# Patient Record
Sex: FEMALE | Race: WHITE | NOT HISPANIC OR LATINO | Employment: OTHER | URBAN - METROPOLITAN AREA
[De-identification: names, ages, dates, MRNs, and addresses within clinical notes are randomized per-mention and may not be internally consistent; named-entity substitution may affect disease eponyms.]

---

## 2021-07-27 ENCOUNTER — OFFICE VISIT (OUTPATIENT)
Dept: CARDIOLOGY | Facility: CLINIC | Age: 56
End: 2021-07-27

## 2021-07-27 VITALS
OXYGEN SATURATION: 96 % | BODY MASS INDEX: 30.36 KG/M2 | HEART RATE: 103 BPM | HEIGHT: 69 IN | DIASTOLIC BLOOD PRESSURE: 90 MMHG | SYSTOLIC BLOOD PRESSURE: 124 MMHG | WEIGHT: 205 LBS

## 2021-07-27 DIAGNOSIS — I95.1 AUTONOMIC ORTHOSTATIC HYPOTENSION: Primary | ICD-10-CM

## 2021-07-27 PROCEDURE — 99204 OFFICE O/P NEW MOD 45 MIN: CPT | Performed by: INTERNAL MEDICINE

## 2021-07-27 RX ORDER — AMITRIPTYLINE HYDROCHLORIDE 50 MG/1
50 TABLET, FILM COATED ORAL
COMMUNITY

## 2021-07-27 RX ORDER — LEVOTHYROXINE SODIUM 88 UG/1
88 TABLET ORAL DAILY
COMMUNITY
Start: 2021-07-08 | End: 2022-01-25

## 2021-07-27 RX ORDER — WARFARIN SODIUM 3 MG/1
3 TABLET ORAL NIGHTLY
COMMUNITY
Start: 2021-05-08

## 2021-07-27 RX ORDER — METOPROLOL SUCCINATE 100 MG/1
100 TABLET, EXTENDED RELEASE ORAL DAILY
COMMUNITY
End: 2021-07-27

## 2021-07-27 RX ORDER — MIDODRINE HYDROCHLORIDE 5 MG/1
5 TABLET ORAL
Qty: 90 TABLET | Refills: 5 | Status: SHIPPED | OUTPATIENT
Start: 2021-07-27 | End: 2022-01-25 | Stop reason: SDUPTHER

## 2021-07-27 RX ORDER — ALENDRONATE SODIUM 70 MG/1
70 TABLET ORAL WEEKLY
COMMUNITY
End: 2022-05-02

## 2021-07-27 RX ORDER — SERTRALINE HYDROCHLORIDE 100 MG/1
100 TABLET, FILM COATED ORAL DAILY
COMMUNITY

## 2021-07-27 RX ORDER — FAMOTIDINE 20 MG/1
20 TABLET, FILM COATED ORAL 2 TIMES DAILY
COMMUNITY

## 2021-07-27 RX ORDER — BACLOFEN 10 MG/1
10 TABLET ORAL 3 TIMES DAILY
COMMUNITY

## 2021-07-27 RX ORDER — ATORVASTATIN CALCIUM 40 MG/1
40 TABLET, FILM COATED ORAL NIGHTLY
Status: ON HOLD | COMMUNITY
Start: 2021-02-22 | End: 2022-05-06 | Stop reason: SDUPTHER

## 2021-07-27 RX ORDER — GABAPENTIN 300 MG/1
300 CAPSULE ORAL 3 TIMES DAILY
COMMUNITY

## 2021-07-27 NOTE — PROGRESS NOTES
"[facility] at [facility]  Consultation H&P  Corrina Reis  1965  [phone]  There is no work phone number on file..    VISIT DATE:  07/27/2021    PCP: Abbey Merrill MD  [address] [facility] [place] [address]    CC:  Chief Complaint   Patient presents with   • Dizziness   • Loss of Consciousness   • Rapid Heart Rate       ASSESSMENT:   Diagnosis Plan   1. Autonomic orthostatic hypotension  Cortisol - AM    Aldosterone / Renin Ratio    TSH       PLAN:  Recurrent orthostatic hypotension: Associated hypokalemia.  Off antihypertensives.  No obvious trigger.  Discussed conservative measures such as avoiding dehydration, liberalizing salt intake, elevating head of bed and as needed use of compression stockings.  Starting midodrine 5 mg p.o. 3 times daily.  A.m. cortisol level pending.    History of Present Illness   56-year-old female who is developed recurrent presyncope and syncope starting in March of this year.  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  Now using a wheeled walker for ambulation due to recurrent presyncope syncope.  Systolic blood pressures will intermittently dipped down into the 80 to 90 mmHg range.  No change in baseline weight.  No new medication prior to onset of her symptoms.  No illness prior to onset of symptoms.  She has been weaned off metoprolol.  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    PHYSICAL EXAMINATION:  Vitals:    07/27/21 1058   BP: 124/90   BP Location: Left arm   Patient Position: Sitting   Pulse: 103   SpO2: 96%   Weight: 93 kg (205 lb)   Height: 175.3 cm (69\")     General Appearance:    Alert, cooperative, no distress, appears stated age   Head:    Normocephalic, without obvious abnormality, atraumatic   Eyes:    conjunctiva/corneas clear, EOM's intact, fundi     benign, " both eyes   Ears:    Normal TM's and external ear canals, both ears   Nose:   Nares normal, septum midline, mucosa normal, no drainage    or sinus tenderness   Throat:   Lips, mucosa, and tongue normal; teeth and gums normal   Neck:   Supple, symmetrical, trachea midline, no adenopathy;     thyroid:  no enlargement/tenderness/nodules; no carotid    bruit or JVD   Back:     Symmetric, no curvature, ROM normal, no CVA tenderness   Lungs:     Clear to auscultation bilaterally, respirations unlabored   Chest Wall:    No tenderness or deformity    Heart:    Regular rate and rhythm, S1 and S2 normal, no murmur, rub   or gallop, normal carotid impulse bilaterally without bruit.   Abdomen:     Soft, non-tender, bowel sounds active all four quadrants,     no masses, no organomegaly   Extremities:   Extremities normal, atraumatic, no cyanosis or edema   Pulses:   2+ and symmetric all extremities   Skin:   Skin color, texture, turgor normal, no rashes or lesions   Lymph nodes:   Cervical, supraclavicular, and axillary nodes normal   Neurologic:   normal strength, sensation intact     throughout       Diagnostic Data:  Procedures  No results found for: CHLPL, TRIG, HDL, LDLDIRECT  Lab Results   Component Value Date    BUN 14 06/15/2021    CREATININE 0.8 06/15/2021     06/15/2021    K 3.0 (L) 06/15/2021     06/15/2021    CO2 24 06/15/2021     No results found for: HGBA1C  Lab Results   Component Value Date    WBC 6.7 06/25/2021    HGB 11.2 (L) 06/25/2021    HCT 33.6 06/25/2021     06/25/2021       PROBLEM LIST:  Patient Active Problem List   Diagnosis   • Autonomic orthostatic hypotension       PAST MEDICAL HX  Past Medical History:   Diagnosis Date   • Anxiety    • Deep vein thrombosis (CMS/HCC)    • GERD (gastroesophageal reflux disease)    • History of pulmonary embolus (PE)    • Hyperlipidemia    • Hypertension    • MRSA infection        Allergies  Allergies   Allergen Reactions   • Penicillins Hives   •  Sulfa Antibiotics Hives       Current Medications    Current Outpatient Medications:   •  alendronate (FOSAMAX) 70 MG tablet, Take 70 mg by mouth 1 (One) Time Per Week., Disp: , Rfl:   •  amitriptyline (ELAVIL) 50 MG tablet, 1 tablet every night at bedtime., Disp: , Rfl:   •  atorvastatin (LIPITOR) 20 MG tablet, Take 20 mg by mouth Daily., Disp: , Rfl:   •  baclofen (LIORESAL) 10 MG tablet, Take 10 mg by mouth 3 (Three) Times a Day., Disp: , Rfl:   •  famotidine (PEPCID) 20 MG tablet, Take 20 mg by mouth 2 (two) times a day., Disp: , Rfl:   •  gabapentin (NEURONTIN) 300 MG capsule, Take 300 mg by mouth 3 (Three) Times a Day., Disp: , Rfl:   •  levothyroxine (SYNTHROID, LEVOTHROID) 88 MCG tablet, Take 88 mcg by mouth Daily., Disp: , Rfl:   •  sertraline (ZOLOFT) 100 MG tablet, 1 tablet Daily., Disp: , Rfl:   •  warfarin (COUMADIN) 2.5 MG tablet, Take 2.5 mg by mouth., Disp: , Rfl:   •  midodrine (PROAMATINE) 5 MG tablet, Take 1 tablet by mouth 3 (Three) Times a Day Before Meals., Disp: 90 tablet, Rfl: 5         ROS  ROS    All other body systems reviewed and are negative    SOCIAL HX  Social History     Socioeconomic History   • Marital status:      Spouse name: Not on file   • Number of children: Not on file   • Years of education: Not on file   • Highest education level: Not on file   Tobacco Use   • Smoking status: Never Smoker   • Smokeless tobacco: Never Used   Vaping Use   • Vaping Use: Never used   Substance and Sexual Activity   • Alcohol use: Not Currently   • Drug use: Not Currently   • Sexual activity: Defer       FAMILY HX  Family History   Problem Relation Age of Onset   • Heart disease Mother    • Atrial fibrillation Mother    • Diabetes Mother    • No Known Problems Father              Raman Meza III, MD, [facility]

## 2022-01-25 ENCOUNTER — OFFICE VISIT (OUTPATIENT)
Dept: CARDIOLOGY | Facility: CLINIC | Age: 57
End: 2022-01-25

## 2022-01-25 VITALS
DIASTOLIC BLOOD PRESSURE: 72 MMHG | HEART RATE: 108 BPM | SYSTOLIC BLOOD PRESSURE: 124 MMHG | BODY MASS INDEX: 34.37 KG/M2 | HEIGHT: 68 IN | WEIGHT: 226.8 LBS | OXYGEN SATURATION: 95 %

## 2022-01-25 DIAGNOSIS — I95.1 AUTONOMIC ORTHOSTATIC HYPOTENSION: Primary | ICD-10-CM

## 2022-01-25 PROCEDURE — 99213 OFFICE O/P EST LOW 20 MIN: CPT | Performed by: INTERNAL MEDICINE

## 2022-01-25 RX ORDER — LEVOTHYROXINE SODIUM 0.1 MG/1
100 TABLET ORAL
COMMUNITY

## 2022-01-25 RX ORDER — MIDODRINE HYDROCHLORIDE 5 MG/1
5 TABLET ORAL
Qty: 90 TABLET | Refills: 5 | Status: SHIPPED | OUTPATIENT
Start: 2022-01-25 | End: 2022-05-02

## 2022-01-25 RX ORDER — LIDOCAINE 50 MG/G
1 PATCH TOPICAL DAILY PRN
COMMUNITY
Start: 2022-01-15

## 2022-01-25 NOTE — PROGRESS NOTES
[facility]  Office visit  Corrina Reis  1965  [phone]  There is no work phone number on file.    VISIT DATE:  1/25/2022    PCP: Abbey Merrill MD  [address] [place] [address]    CC:  Chief Complaint   Patient presents with   • Autonomic orthostatic hypotension           ASSESSMENT:   Diagnosis Plan   1. Autonomic orthostatic hypotension         PLAN:  Recurrent orthostatic hypotension:  Continue conservative measures such as avoiding dehydration, liberalizing salt intake, elevating head of bed and as needed use of compression stockings.    Continue midodrine 5 mg p.o. 3 times daily.     Perioperative cardiovascular risk assessment: Upcoming left knee replacement.  Will be low risk for major perioperative cardiovascular complications.  No further cardiac testing or medication titration indicated prior to surgery.    Subjective  Normal assessment: Has had no further episodes of presyncope or syncope since initiation of midodrine at the end of last summer.  Blood pressures still running less than 130/80 mmHg.    Initial evaluation: 56-year-old female who is developed recurrent presyncope and syncope starting in March of this year.  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  Now using a wheeled walker for ambulation due to recurrent presyncope syncope.  Systolic blood pressures will intermittently dipped down into the 80 to 90 mmHg range.  No change in baseline weight.  No new medication prior to onset of her symptoms.  No illness prior to onset of symptoms.  She has been weaned off metoprolol.  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    PHYSICAL EXAMINATION:  Vitals:    01/25/22 1418   BP: 124/72   BP Location: Left arm   Patient Position: Sitting   Pulse: 108   SpO2:  "95%   Weight: 103 kg (226 lb 12.8 oz)   Height: 172.7 cm (68\")     General Appearance:    Alert, cooperative, no distress, appears stated age   Head:    Normocephalic, without obvious abnormality, atraumatic   Eyes:    conjunctiva/corneas clear   Nose:   Nares normal, septum midline, mucosa normal, no drainage   Throat:   Lips, teeth and gums normal   Neck:   Supple, symmetrical, trachea midline, no carotid    bruit or JVD   Lungs:     Clear to auscultation bilaterally, respirations unlabored   Chest Wall:    No tenderness or deformity    Heart:    Regular rate and rhythm, S1 and S2 normal, no murmur, rub   or gallop, normal carotid impulse bilaterally without bruit.   Abdomen:     Soft, non-tender   Extremities:   Extremities normal, atraumatic, no cyanosis or edema   Pulses:   2+ and symmetric all extremities   Skin:   Skin color, texture, turgor normal, no rashes or lesions       Diagnostic Data:  Procedures  No results found for: CHLPL, TRIG, HDL, LDLDIRECT  Lab Results   Component Value Date    BUN 14 06/15/2021    CREATININE 0.8 06/15/2021     06/15/2021    K 3.0 (L) 06/15/2021     06/15/2021    CO2 24 06/15/2021     No results found for: HGBA1C  Lab Results   Component Value Date    WBC 6.7 06/25/2021    HGB 11.2 (L) 06/25/2021    HCT 33.6 06/25/2021     06/25/2021       Allergies  Allergies   Allergen Reactions   • Penicillins Hives   • Sulfa Antibiotics Hives       Current Medications    Current Outpatient Medications:   •  alendronate (FOSAMAX) 70 MG tablet, Take 70 mg by mouth 1 (One) Time Per Week., Disp: , Rfl:   •  amitriptyline (ELAVIL) 50 MG tablet, 1 tablet every night at bedtime., Disp: , Rfl:   •  atorvastatin (LIPITOR) 20 MG tablet, Take 20 mg by mouth Daily., Disp: , Rfl:   •  baclofen (LIORESAL) 10 MG tablet, Take 10 mg by mouth 3 (Three) Times a Day., Disp: , Rfl:   •  famotidine (PEPCID) 20 MG tablet, Take 20 mg by mouth 2 (two) times a day., Disp: , Rfl:   •  gabapentin " (NEURONTIN) 300 MG capsule, Take 300 mg by mouth 3 (Three) Times a Day., Disp: , Rfl:   •  levothyroxine (SYNTHROID, LEVOTHROID) 100 MCG tablet, Take 100 mcg by mouth Daily., Disp: , Rfl:   •  lidocaine (LIDODERM) 5 %, APPLY 1 PATCH BY TRANSDERMAL ROUTE EVERY DAY (MAY WEAR UP TO 12HOURS.) AS NEEDED, Disp: , Rfl:   •  midodrine (PROAMATINE) 5 MG tablet, Take 1 tablet by mouth 3 (Three) Times a Day Before Meals., Disp: 90 tablet, Rfl: 5  •  sertraline (ZOLOFT) 100 MG tablet, 1 tablet Daily., Disp: , Rfl:   •  warfarin (COUMADIN) 3 MG tablet, Take 3 mg by mouth Every Night., Disp: , Rfl:           ROS  ROS      SOCIAL HX  Social History     Socioeconomic History   • Marital status:    Tobacco Use   • Smoking status: Never Smoker   • Smokeless tobacco: Never Used   Vaping Use   • Vaping Use: Never used   Substance and Sexual Activity   • Alcohol use: Not Currently   • Drug use: Not Currently   • Sexual activity: Defer       FAMILY HX  Family History   Problem Relation Age of Onset   • Heart disease Mother    • Atrial fibrillation Mother    • Diabetes Mother    • No Known Problems Father    • No Known Problems Sister    • Hypertension Sister    • Autoimmune disease Sister    • Diabetes Sister    • Graves' disease Sister    • Lung cancer Brother    • Rectal cancer Brother    • Diabetes Brother    • Hypertension Brother    • Graves' disease Brother    • Hypertension Brother    • Heart attack Brother              Raman Meza III, MD, [facility]

## 2022-02-09 ENCOUNTER — APPOINTMENT (OUTPATIENT)
Dept: PREADMISSION TESTING | Facility: HOSPITAL | Age: 57
End: 2022-02-09

## 2022-05-02 ENCOUNTER — PRE-ADMISSION TESTING (OUTPATIENT)
Dept: PREADMISSION TESTING | Facility: HOSPITAL | Age: 57
End: 2022-05-02

## 2022-05-02 VITALS — HEIGHT: 68 IN | WEIGHT: 230.71 LBS | BODY MASS INDEX: 34.97 KG/M2

## 2022-05-02 LAB
25(OH)D3 SERPL-MCNC: 31.8 NG/ML (ref 30–100)
ALBUMIN SERPL-MCNC: 4.7 G/DL (ref 3.5–5.2)
ALBUMIN/GLOB SERPL: 2 G/DL
ALP SERPL-CCNC: 79 U/L (ref 39–117)
ALT SERPL W P-5'-P-CCNC: 21 U/L (ref 1–33)
ANION GAP SERPL CALCULATED.3IONS-SCNC: 10 MMOL/L (ref 5–15)
APTT PPP: 33.8 SECONDS (ref 50–95)
AST SERPL-CCNC: 26 U/L (ref 1–32)
BASOPHILS # BLD AUTO: 0.06 10*3/MM3 (ref 0–0.2)
BASOPHILS NFR BLD AUTO: 1.2 % (ref 0–1.5)
BILIRUB SERPL-MCNC: 0.6 MG/DL (ref 0–1.2)
BUN SERPL-MCNC: 15 MG/DL (ref 6–20)
BUN/CREAT SERPL: 14.3 (ref 7–25)
CALCIUM SPEC-SCNC: 9.3 MG/DL (ref 8.6–10.5)
CHLORIDE SERPL-SCNC: 103 MMOL/L (ref 98–107)
CO2 SERPL-SCNC: 27 MMOL/L (ref 22–29)
CREAT SERPL-MCNC: 1.05 MG/DL (ref 0.57–1)
CRP SERPL-MCNC: 0.83 MG/DL (ref 0–0.5)
DEPRECATED RDW RBC AUTO: 45.1 FL (ref 37–54)
EGFRCR SERPLBLD CKD-EPI 2021: 61.7 ML/MIN/1.73
EOSINOPHIL # BLD AUTO: 0.12 10*3/MM3 (ref 0–0.4)
EOSINOPHIL NFR BLD AUTO: 2.3 % (ref 0.3–6.2)
ERYTHROCYTE [DISTWIDTH] IN BLOOD BY AUTOMATED COUNT: 12.8 % (ref 12.3–15.4)
ERYTHROCYTE [SEDIMENTATION RATE] IN BLOOD: 16 MM/HR (ref 0–30)
GLOBULIN UR ELPH-MCNC: 2.4 GM/DL
GLUCOSE SERPL-MCNC: 109 MG/DL (ref 65–99)
HBA1C MFR BLD: 5.3 % (ref 4.8–5.6)
HCT VFR BLD AUTO: 39 % (ref 34–46.6)
HGB BLD-MCNC: 13 G/DL (ref 12–15.9)
IMM GRANULOCYTES # BLD AUTO: 0.01 10*3/MM3 (ref 0–0.05)
IMM GRANULOCYTES NFR BLD AUTO: 0.2 % (ref 0–0.5)
INR PPP: 1.52 (ref 0.84–1.13)
LYMPHOCYTES # BLD AUTO: 1.47 10*3/MM3 (ref 0.7–3.1)
LYMPHOCYTES NFR BLD AUTO: 28.8 % (ref 19.6–45.3)
MCH RBC QN AUTO: 32 PG (ref 26.6–33)
MCHC RBC AUTO-ENTMCNC: 33.3 G/DL (ref 31.5–35.7)
MCV RBC AUTO: 96.1 FL (ref 79–97)
MONOCYTES # BLD AUTO: 0.39 10*3/MM3 (ref 0.1–0.9)
MONOCYTES NFR BLD AUTO: 7.6 % (ref 5–12)
MRSA DNA SPEC QL NAA+PROBE: NEGATIVE
NEUTROPHILS NFR BLD AUTO: 3.06 10*3/MM3 (ref 1.7–7)
NEUTROPHILS NFR BLD AUTO: 59.9 % (ref 42.7–76)
NRBC BLD AUTO-RTO: 0 /100 WBC (ref 0–0.2)
PLATELET # BLD AUTO: 224 10*3/MM3 (ref 140–450)
PMV BLD AUTO: 10.4 FL (ref 6–12)
POTASSIUM SERPL-SCNC: 3.8 MMOL/L (ref 3.5–5.2)
PROT SERPL-MCNC: 7.1 G/DL (ref 6–8.5)
PROTHROMBIN TIME: 18.3 SECONDS (ref 11.4–14.4)
QT INTERVAL: 420 MS
QTC INTERVAL: 478 MS
RBC # BLD AUTO: 4.06 10*6/MM3 (ref 3.77–5.28)
SARS-COV-2 RNA PNL SPEC NAA+PROBE: NOT DETECTED
SODIUM SERPL-SCNC: 140 MMOL/L (ref 136–145)
WBC NRBC COR # BLD: 5.11 10*3/MM3 (ref 3.4–10.8)

## 2022-05-02 PROCEDURE — C9803 HOPD COVID-19 SPEC COLLECT: HCPCS

## 2022-05-02 PROCEDURE — 87641 MR-STAPH DNA AMP PROBE: CPT

## 2022-05-02 PROCEDURE — 80053 COMPREHEN METABOLIC PANEL: CPT

## 2022-05-02 PROCEDURE — 86140 C-REACTIVE PROTEIN: CPT

## 2022-05-02 PROCEDURE — 36415 COLL VENOUS BLD VENIPUNCTURE: CPT

## 2022-05-02 PROCEDURE — 85730 THROMBOPLASTIN TIME PARTIAL: CPT

## 2022-05-02 PROCEDURE — 83036 HEMOGLOBIN GLYCOSYLATED A1C: CPT

## 2022-05-02 PROCEDURE — 85610 PROTHROMBIN TIME: CPT

## 2022-05-02 PROCEDURE — 85652 RBC SED RATE AUTOMATED: CPT

## 2022-05-02 PROCEDURE — 85025 COMPLETE CBC W/AUTO DIFF WBC: CPT

## 2022-05-02 PROCEDURE — 93010 ELECTROCARDIOGRAM REPORT: CPT | Performed by: INTERNAL MEDICINE

## 2022-05-02 PROCEDURE — 82306 VITAMIN D 25 HYDROXY: CPT

## 2022-05-02 PROCEDURE — G0480 DRUG TEST DEF 1-7 CLASSES: HCPCS

## 2022-05-02 PROCEDURE — 93005 ELECTROCARDIOGRAM TRACING: CPT

## 2022-05-02 PROCEDURE — 82985 ASSAY OF GLYCATED PROTEIN: CPT

## 2022-05-02 PROCEDURE — U0004 COV-19 TEST NON-CDC HGH THRU: HCPCS

## 2022-05-02 RX ORDER — ONABOTULINUMTOXINA 200 [USP'U]/1
INJECTION, POWDER, LYOPHILIZED, FOR SOLUTION INTRADERMAL; INTRAMUSCULAR
COMMUNITY

## 2022-05-02 RX ORDER — DIPHENHYDRAMINE HCL 25 MG
25 CAPSULE ORAL EVERY 6 HOURS PRN
COMMUNITY
End: 2023-03-13

## 2022-05-02 RX ORDER — MIDODRINE HYDROCHLORIDE 5 MG/1
5 TABLET ORAL 3 TIMES DAILY
COMMUNITY
End: 2022-08-24 | Stop reason: DRUGHIGH

## 2022-05-02 RX ORDER — HYDROCHLOROTHIAZIDE 12.5 MG/1
12.5 CAPSULE, GELATIN COATED ORAL DAILY
COMMUNITY
Start: 2022-04-14 | End: 2022-09-01

## 2022-05-02 RX ORDER — CYCLOBENZAPRINE HCL 5 MG
5 TABLET ORAL 3 TIMES DAILY PRN
COMMUNITY

## 2022-05-02 RX ORDER — SULINDAC 150 MG/1
150 TABLET ORAL DAILY
Status: ON HOLD | COMMUNITY
End: 2022-05-06 | Stop reason: SDUPTHER

## 2022-05-03 ENCOUNTER — ANESTHESIA EVENT (OUTPATIENT)
Dept: PERIOP | Facility: HOSPITAL | Age: 57
End: 2022-05-03

## 2022-05-03 LAB — FRUCTOSAMINE SERPL-SCNC: 275 UMOL/L (ref 0–285)

## 2022-05-03 RX ORDER — FAMOTIDINE 10 MG/ML
20 INJECTION, SOLUTION INTRAVENOUS ONCE
Status: CANCELLED | OUTPATIENT
Start: 2022-05-03 | End: 2022-05-03

## 2022-05-03 RX ORDER — SODIUM CHLORIDE 0.9 % (FLUSH) 0.9 %
10 SYRINGE (ML) INJECTION EVERY 12 HOURS SCHEDULED
Status: CANCELLED | OUTPATIENT
Start: 2022-05-03

## 2022-05-04 ENCOUNTER — ANESTHESIA (OUTPATIENT)
Dept: PERIOP | Facility: HOSPITAL | Age: 57
End: 2022-05-04

## 2022-05-04 ENCOUNTER — ANESTHESIA EVENT CONVERTED (OUTPATIENT)
Dept: ANESTHESIOLOGY | Facility: HOSPITAL | Age: 57
End: 2022-05-04

## 2022-05-04 ENCOUNTER — APPOINTMENT (OUTPATIENT)
Dept: GENERAL RADIOLOGY | Facility: HOSPITAL | Age: 57
End: 2022-05-04

## 2022-05-04 ENCOUNTER — HOSPITAL ENCOUNTER (INPATIENT)
Facility: HOSPITAL | Age: 57
LOS: 6 days | Discharge: HOME HEALTH | End: 2022-05-10
Attending: ORTHOPAEDIC SURGERY | Admitting: ORTHOPAEDIC SURGERY

## 2022-05-04 DIAGNOSIS — M00.9 INFECTION OF LEFT KNEE: ICD-10-CM

## 2022-05-04 DIAGNOSIS — Z98.890 S/P DEBRIDEMENT: Primary | ICD-10-CM

## 2022-05-04 PROBLEM — I10 HYPERTENSION: Status: ACTIVE | Noted: 2022-05-04

## 2022-05-04 PROBLEM — E78.5 HYPERLIPIDEMIA: Status: ACTIVE | Noted: 2022-05-04

## 2022-05-04 PROBLEM — I10 HYPERTENSION: Status: RESOLVED | Noted: 2022-05-04 | Resolved: 2022-05-04

## 2022-05-04 PROBLEM — Z86.718 HISTORY OF DVT (DEEP VEIN THROMBOSIS): Status: ACTIVE | Noted: 2022-05-04

## 2022-05-04 PROBLEM — E03.9 HYPOTHYROID: Status: ACTIVE | Noted: 2022-05-04

## 2022-05-04 LAB
GLUCOSE BLDC GLUCOMTR-MCNC: 106 MG/DL (ref 70–130)
INR PPP: 1.35 (ref 0.84–1.13)
PROTHROMBIN TIME: 16.6 SECONDS (ref 11.4–14.4)

## 2022-05-04 PROCEDURE — 25010000002 CEFAZOLIN IN DEXTROSE 2-4 GM/100ML-% SOLUTION: Performed by: ORTHOPAEDIC SURGERY

## 2022-05-04 PROCEDURE — 73560 X-RAY EXAM OF KNEE 1 OR 2: CPT

## 2022-05-04 PROCEDURE — 25010000002 FENTANYL CITRATE (PF) 50 MCG/ML SOLUTION

## 2022-05-04 PROCEDURE — 25010000002 DEXAMETHASONE SODIUM PHOSPHATE 10 MG/ML SOLUTION: Performed by: NURSE ANESTHETIST, CERTIFIED REGISTERED

## 2022-05-04 PROCEDURE — 0SRD0EZ REPLACEMENT OF LEFT KNEE JOINT WITH ARTICULATING SPACER, OPEN APPROACH: ICD-10-PCS | Performed by: ORTHOPAEDIC SURGERY

## 2022-05-04 PROCEDURE — 0 TOBRAMYCIN PER 80 MG: Performed by: ORTHOPAEDIC SURGERY

## 2022-05-04 PROCEDURE — 85610 PROTHROMBIN TIME: CPT | Performed by: ANESTHESIOLOGY

## 2022-05-04 PROCEDURE — 25010000002 VANCOMYCIN 10 G RECONSTITUTED SOLUTION: Performed by: ORTHOPAEDIC SURGERY

## 2022-05-04 PROCEDURE — C1776 JOINT DEVICE (IMPLANTABLE): HCPCS | Performed by: ORTHOPAEDIC SURGERY

## 2022-05-04 PROCEDURE — 97116 GAIT TRAINING THERAPY: CPT

## 2022-05-04 PROCEDURE — 87102 FUNGUS ISOLATION CULTURE: CPT | Performed by: ORTHOPAEDIC SURGERY

## 2022-05-04 PROCEDURE — 87075 CULTR BACTERIA EXCEPT BLOOD: CPT | Performed by: ORTHOPAEDIC SURGERY

## 2022-05-04 PROCEDURE — 25010000002 ROPIVACAINE PER 1 MG: Performed by: NURSE ANESTHETIST, CERTIFIED REGISTERED

## 2022-05-04 PROCEDURE — 0 LIDOCAINE 1 % SOLUTION: Performed by: NURSE ANESTHETIST, CERTIFIED REGISTERED

## 2022-05-04 PROCEDURE — 0SBD0ZZ EXCISION OF LEFT KNEE JOINT, OPEN APPROACH: ICD-10-PCS | Performed by: ORTHOPAEDIC SURGERY

## 2022-05-04 PROCEDURE — C1713 ANCHOR/SCREW BN/BN,TIS/BN: HCPCS | Performed by: ORTHOPAEDIC SURGERY

## 2022-05-04 PROCEDURE — 87205 SMEAR GRAM STAIN: CPT | Performed by: ORTHOPAEDIC SURGERY

## 2022-05-04 PROCEDURE — 87176 TISSUE HOMOGENIZATION CULTR: CPT | Performed by: ORTHOPAEDIC SURGERY

## 2022-05-04 PROCEDURE — 25010000002 FENTANYL CITRATE (PF) 50 MCG/ML SOLUTION: Performed by: NURSE ANESTHETIST, CERTIFIED REGISTERED

## 2022-05-04 PROCEDURE — 82962 GLUCOSE BLOOD TEST: CPT

## 2022-05-04 PROCEDURE — 97161 PT EVAL LOW COMPLEX 20 MIN: CPT

## 2022-05-04 PROCEDURE — L1830 KO IMMOB CANVAS LONG PRE OTS: HCPCS | Performed by: ORTHOPAEDIC SURGERY

## 2022-05-04 PROCEDURE — 27310 EXPLORATION OF KNEE JOINT: CPT

## 2022-05-04 PROCEDURE — 87206 SMEAR FLUORESCENT/ACID STAI: CPT | Performed by: ORTHOPAEDIC SURGERY

## 2022-05-04 PROCEDURE — 25010000002 PROPOFOL 10 MG/ML EMULSION: Performed by: NURSE ANESTHETIST, CERTIFIED REGISTERED

## 2022-05-04 PROCEDURE — 25010000002 ONDANSETRON PER 1 MG: Performed by: NURSE ANESTHETIST, CERTIFIED REGISTERED

## 2022-05-04 PROCEDURE — 87070 CULTURE OTHR SPECIMN AEROBIC: CPT | Performed by: ORTHOPAEDIC SURGERY

## 2022-05-04 PROCEDURE — 87116 MYCOBACTERIA CULTURE: CPT | Performed by: ORTHOPAEDIC SURGERY

## 2022-05-04 PROCEDURE — 25010000002 DEXAMETHASONE PER 1 MG: Performed by: NURSE ANESTHETIST, CERTIFIED REGISTERED

## 2022-05-04 PROCEDURE — 25010000002 VANCOMYCIN 1 G RECONSTITUTED SOLUTION: Performed by: ORTHOPAEDIC SURGERY

## 2022-05-04 DEVICE — DEV CONTRL TISS STRATAFIX SPIRAL PDO BIDIR 1 36X36CM: Type: IMPLANTABLE DEVICE | Site: KNEE | Status: FUNCTIONAL

## 2022-05-04 DEVICE — CMT BONE SIMPLEX/P FULL DOSE 10/PK: Type: IMPLANTABLE DEVICE | Site: KNEE | Status: FUNCTIONAL

## 2022-05-04 DEVICE — IMPLANTABLE DEVICE: Type: IMPLANTABLE DEVICE | Site: KNEE | Status: FUNCTIONAL

## 2022-05-04 DEVICE — COMP FEM TRIATH CR CMT NO6 LT: Type: IMPLANTABLE DEVICE | Site: KNEE | Status: FUNCTIONAL

## 2022-05-04 RX ORDER — MEPERIDINE HYDROCHLORIDE 25 MG/ML
12.5 INJECTION INTRAMUSCULAR; INTRAVENOUS; SUBCUTANEOUS
Status: DISCONTINUED | OUTPATIENT
Start: 2022-05-04 | End: 2022-05-04 | Stop reason: HOSPADM

## 2022-05-04 RX ORDER — FAMOTIDINE 20 MG/1
20 TABLET, FILM COATED ORAL ONCE
Status: COMPLETED | OUTPATIENT
Start: 2022-05-04 | End: 2022-05-04

## 2022-05-04 RX ORDER — MAGNESIUM HYDROXIDE 1200 MG/15ML
LIQUID ORAL AS NEEDED
Status: DISCONTINUED | OUTPATIENT
Start: 2022-05-04 | End: 2022-05-04 | Stop reason: HOSPADM

## 2022-05-04 RX ORDER — MIDAZOLAM HYDROCHLORIDE 1 MG/ML
1 INJECTION INTRAMUSCULAR; INTRAVENOUS
Status: DISCONTINUED | OUTPATIENT
Start: 2022-05-04 | End: 2022-05-04 | Stop reason: HOSPADM

## 2022-05-04 RX ORDER — HYDROCODONE BITARTRATE AND ACETAMINOPHEN 5; 325 MG/1; MG/1
1 TABLET ORAL ONCE AS NEEDED
Status: DISCONTINUED | OUTPATIENT
Start: 2022-05-04 | End: 2022-05-04 | Stop reason: HOSPADM

## 2022-05-04 RX ORDER — DIPHENHYDRAMINE HCL 25 MG
25 CAPSULE ORAL EVERY 6 HOURS PRN
Status: DISCONTINUED | OUTPATIENT
Start: 2022-05-04 | End: 2022-05-10 | Stop reason: HOSPADM

## 2022-05-04 RX ORDER — PROMETHAZINE HYDROCHLORIDE 25 MG/1
25 TABLET ORAL ONCE AS NEEDED
Status: DISCONTINUED | OUTPATIENT
Start: 2022-05-04 | End: 2022-05-04 | Stop reason: HOSPADM

## 2022-05-04 RX ORDER — TOBRAMYCIN 1.2 G/30ML
INJECTION, POWDER, LYOPHILIZED, FOR SOLUTION INTRAVENOUS AS NEEDED
Status: DISCONTINUED | OUTPATIENT
Start: 2022-05-04 | End: 2022-05-04 | Stop reason: HOSPADM

## 2022-05-04 RX ORDER — MELOXICAM 15 MG/1
15 TABLET ORAL ONCE
Status: COMPLETED | OUTPATIENT
Start: 2022-05-04 | End: 2022-05-04

## 2022-05-04 RX ORDER — MIDODRINE HYDROCHLORIDE 5 MG/1
5 TABLET ORAL
Status: DISCONTINUED | OUTPATIENT
Start: 2022-05-04 | End: 2022-05-10 | Stop reason: HOSPADM

## 2022-05-04 RX ORDER — ACETAMINOPHEN 500 MG
1000 TABLET ORAL ONCE
Status: COMPLETED | OUTPATIENT
Start: 2022-05-04 | End: 2022-05-04

## 2022-05-04 RX ORDER — TRAMADOL HYDROCHLORIDE 50 MG/1
50 TABLET ORAL EVERY 8 HOURS PRN
Status: DISCONTINUED | OUTPATIENT
Start: 2022-05-04 | End: 2022-05-10 | Stop reason: HOSPADM

## 2022-05-04 RX ORDER — LABETALOL HYDROCHLORIDE 5 MG/ML
10 INJECTION, SOLUTION INTRAVENOUS EVERY 4 HOURS PRN
Status: DISCONTINUED | OUTPATIENT
Start: 2022-05-04 | End: 2022-05-10 | Stop reason: HOSPADM

## 2022-05-04 RX ORDER — BACLOFEN 10 MG/1
10 TABLET ORAL 3 TIMES DAILY
Status: DISCONTINUED | OUTPATIENT
Start: 2022-05-04 | End: 2022-05-10 | Stop reason: HOSPADM

## 2022-05-04 RX ORDER — MELOXICAM 7.5 MG/1
15 TABLET ORAL DAILY
Status: DISCONTINUED | OUTPATIENT
Start: 2022-05-04 | End: 2022-05-10 | Stop reason: HOSPADM

## 2022-05-04 RX ORDER — CYCLOBENZAPRINE HCL 10 MG
5 TABLET ORAL 3 TIMES DAILY PRN
Status: DISCONTINUED | OUTPATIENT
Start: 2022-05-04 | End: 2022-05-10 | Stop reason: HOSPADM

## 2022-05-04 RX ORDER — SODIUM CHLORIDE 0.9 % (FLUSH) 0.9 %
3-10 SYRINGE (ML) INJECTION AS NEEDED
Status: DISCONTINUED | OUTPATIENT
Start: 2022-05-04 | End: 2022-05-04 | Stop reason: HOSPADM

## 2022-05-04 RX ORDER — ROCURONIUM BROMIDE 10 MG/ML
INJECTION, SOLUTION INTRAVENOUS AS NEEDED
Status: DISCONTINUED | OUTPATIENT
Start: 2022-05-04 | End: 2022-05-04 | Stop reason: SURG

## 2022-05-04 RX ORDER — CEFAZOLIN SODIUM 2 G/100ML
2 INJECTION, SOLUTION INTRAVENOUS ONCE
Status: COMPLETED | OUTPATIENT
Start: 2022-05-04 | End: 2022-05-04

## 2022-05-04 RX ORDER — ONDANSETRON 2 MG/ML
INJECTION INTRAMUSCULAR; INTRAVENOUS AS NEEDED
Status: DISCONTINUED | OUTPATIENT
Start: 2022-05-04 | End: 2022-05-04 | Stop reason: SURG

## 2022-05-04 RX ORDER — BUPIVACAINE HYDROCHLORIDE 2.5 MG/ML
INJECTION, SOLUTION EPIDURAL; INFILTRATION; INTRACAUDAL
Status: COMPLETED | OUTPATIENT
Start: 2022-05-04 | End: 2022-05-04

## 2022-05-04 RX ORDER — OXYCODONE HYDROCHLORIDE 5 MG/1
5 TABLET ORAL EVERY 4 HOURS PRN
Status: DISCONTINUED | OUTPATIENT
Start: 2022-05-04 | End: 2022-05-10 | Stop reason: HOSPADM

## 2022-05-04 RX ORDER — GABAPENTIN 300 MG/1
300 CAPSULE ORAL 3 TIMES DAILY
Status: DISCONTINUED | OUTPATIENT
Start: 2022-05-04 | End: 2022-05-10 | Stop reason: HOSPADM

## 2022-05-04 RX ORDER — OXYCODONE HYDROCHLORIDE 5 MG/1
10 TABLET ORAL EVERY 4 HOURS PRN
Status: DISCONTINUED | OUTPATIENT
Start: 2022-05-04 | End: 2022-05-10 | Stop reason: HOSPADM

## 2022-05-04 RX ORDER — ONDANSETRON 4 MG/1
4 TABLET, FILM COATED ORAL EVERY 6 HOURS PRN
Status: DISCONTINUED | OUTPATIENT
Start: 2022-05-04 | End: 2022-05-10 | Stop reason: HOSPADM

## 2022-05-04 RX ORDER — ONDANSETRON 2 MG/ML
4 INJECTION INTRAMUSCULAR; INTRAVENOUS EVERY 6 HOURS PRN
Status: DISCONTINUED | OUTPATIENT
Start: 2022-05-04 | End: 2022-05-10 | Stop reason: HOSPADM

## 2022-05-04 RX ORDER — DROPERIDOL 2.5 MG/ML
0.62 INJECTION, SOLUTION INTRAMUSCULAR; INTRAVENOUS ONCE AS NEEDED
Status: DISCONTINUED | OUTPATIENT
Start: 2022-05-04 | End: 2022-05-04 | Stop reason: HOSPADM

## 2022-05-04 RX ORDER — ONDANSETRON 2 MG/ML
4 INJECTION INTRAMUSCULAR; INTRAVENOUS ONCE AS NEEDED
Status: DISCONTINUED | OUTPATIENT
Start: 2022-05-04 | End: 2022-05-04 | Stop reason: HOSPADM

## 2022-05-04 RX ORDER — IPRATROPIUM BROMIDE AND ALBUTEROL SULFATE 2.5; .5 MG/3ML; MG/3ML
3 SOLUTION RESPIRATORY (INHALATION) ONCE AS NEEDED
Status: DISCONTINUED | OUTPATIENT
Start: 2022-05-04 | End: 2022-05-04 | Stop reason: HOSPADM

## 2022-05-04 RX ORDER — FENTANYL CITRATE 50 UG/ML
INJECTION, SOLUTION INTRAMUSCULAR; INTRAVENOUS AS NEEDED
Status: DISCONTINUED | OUTPATIENT
Start: 2022-05-04 | End: 2022-05-04 | Stop reason: SURG

## 2022-05-04 RX ORDER — ACETAMINOPHEN 160 MG
TABLET,DISINTEGRATING ORAL AS NEEDED
Status: DISCONTINUED | OUTPATIENT
Start: 2022-05-04 | End: 2022-05-04 | Stop reason: HOSPADM

## 2022-05-04 RX ORDER — WARFARIN SODIUM 3 MG/1
3 TABLET ORAL
Status: DISCONTINUED | OUTPATIENT
Start: 2022-05-04 | End: 2022-05-05

## 2022-05-04 RX ORDER — HYDROMORPHONE HYDROCHLORIDE 1 MG/ML
0.5 INJECTION, SOLUTION INTRAMUSCULAR; INTRAVENOUS; SUBCUTANEOUS
Status: DISCONTINUED | OUTPATIENT
Start: 2022-05-04 | End: 2022-05-10 | Stop reason: HOSPADM

## 2022-05-04 RX ORDER — ATORVASTATIN CALCIUM 40 MG/1
40 TABLET, FILM COATED ORAL NIGHTLY
Status: DISCONTINUED | OUTPATIENT
Start: 2022-05-04 | End: 2022-05-05

## 2022-05-04 RX ORDER — LABETALOL HYDROCHLORIDE 5 MG/ML
5 INJECTION, SOLUTION INTRAVENOUS
Status: DISCONTINUED | OUTPATIENT
Start: 2022-05-04 | End: 2022-05-04 | Stop reason: HOSPADM

## 2022-05-04 RX ORDER — SODIUM CHLORIDE, SODIUM LACTATE, POTASSIUM CHLORIDE, CALCIUM CHLORIDE 600; 310; 30; 20 MG/100ML; MG/100ML; MG/100ML; MG/100ML
100 INJECTION, SOLUTION INTRAVENOUS CONTINUOUS
Status: DISCONTINUED | OUTPATIENT
Start: 2022-05-04 | End: 2022-05-10 | Stop reason: HOSPADM

## 2022-05-04 RX ORDER — LEVOTHYROXINE SODIUM 0.1 MG/1
100 TABLET ORAL
Status: DISCONTINUED | OUTPATIENT
Start: 2022-05-05 | End: 2022-05-10 | Stop reason: HOSPADM

## 2022-05-04 RX ORDER — SERTRALINE HYDROCHLORIDE 100 MG/1
100 TABLET, FILM COATED ORAL DAILY
Status: DISCONTINUED | OUTPATIENT
Start: 2022-05-04 | End: 2022-05-10 | Stop reason: HOSPADM

## 2022-05-04 RX ORDER — PROMETHAZINE HYDROCHLORIDE 25 MG/1
25 SUPPOSITORY RECTAL ONCE AS NEEDED
Status: DISCONTINUED | OUTPATIENT
Start: 2022-05-04 | End: 2022-05-04 | Stop reason: HOSPADM

## 2022-05-04 RX ORDER — LIDOCAINE HYDROCHLORIDE 10 MG/ML
INJECTION, SOLUTION INFILTRATION; PERINEURAL AS NEEDED
Status: DISCONTINUED | OUTPATIENT
Start: 2022-05-04 | End: 2022-05-04 | Stop reason: SURG

## 2022-05-04 RX ORDER — PREGABALIN 75 MG/1
75 CAPSULE ORAL ONCE
Status: COMPLETED | OUTPATIENT
Start: 2022-05-04 | End: 2022-05-04

## 2022-05-04 RX ORDER — SODIUM CHLORIDE 0.9 % (FLUSH) 0.9 %
3 SYRINGE (ML) INJECTION EVERY 12 HOURS SCHEDULED
Status: DISCONTINUED | OUTPATIENT
Start: 2022-05-04 | End: 2022-05-04 | Stop reason: HOSPADM

## 2022-05-04 RX ORDER — ACETAMINOPHEN 500 MG
1000 TABLET ORAL EVERY 8 HOURS SCHEDULED
Status: DISCONTINUED | OUTPATIENT
Start: 2022-05-04 | End: 2022-05-08 | Stop reason: ALTCHOICE

## 2022-05-04 RX ORDER — LIDOCAINE HYDROCHLORIDE 10 MG/ML
0.5 INJECTION, SOLUTION EPIDURAL; INFILTRATION; INTRACAUDAL; PERINEURAL ONCE AS NEEDED
Status: COMPLETED | OUTPATIENT
Start: 2022-05-04 | End: 2022-05-04

## 2022-05-04 RX ORDER — DEXAMETHASONE SODIUM PHOSPHATE 10 MG/ML
INJECTION, SOLUTION INTRAMUSCULAR; INTRAVENOUS
Status: COMPLETED | OUTPATIENT
Start: 2022-05-04 | End: 2022-05-04

## 2022-05-04 RX ORDER — DIPHENHYDRAMINE HYDROCHLORIDE 50 MG/ML
25 INJECTION INTRAMUSCULAR; INTRAVENOUS EVERY 6 HOURS PRN
Status: DISCONTINUED | OUTPATIENT
Start: 2022-05-04 | End: 2022-05-10 | Stop reason: HOSPADM

## 2022-05-04 RX ORDER — VANCOMYCIN HYDROCHLORIDE 1 G/20ML
INJECTION, POWDER, LYOPHILIZED, FOR SOLUTION INTRAVENOUS AS NEEDED
Status: DISCONTINUED | OUTPATIENT
Start: 2022-05-04 | End: 2022-05-04 | Stop reason: HOSPADM

## 2022-05-04 RX ORDER — CEFAZOLIN SODIUM 2 G/100ML
2 INJECTION, SOLUTION INTRAVENOUS EVERY 8 HOURS
Status: COMPLETED | OUTPATIENT
Start: 2022-05-04 | End: 2022-05-04

## 2022-05-04 RX ORDER — TRANEXAMIC ACID 10 MG/ML
1000 INJECTION, SOLUTION INTRAVENOUS ONCE
Status: COMPLETED | OUTPATIENT
Start: 2022-05-04 | End: 2022-05-04

## 2022-05-04 RX ORDER — DEXAMETHASONE SODIUM PHOSPHATE 4 MG/ML
INJECTION, SOLUTION INTRA-ARTICULAR; INTRALESIONAL; INTRAMUSCULAR; INTRAVENOUS; SOFT TISSUE AS NEEDED
Status: DISCONTINUED | OUTPATIENT
Start: 2022-05-04 | End: 2022-05-04 | Stop reason: SURG

## 2022-05-04 RX ORDER — NALOXONE HCL 0.4 MG/ML
0.1 VIAL (ML) INJECTION
Status: DISCONTINUED | OUTPATIENT
Start: 2022-05-04 | End: 2022-05-10 | Stop reason: HOSPADM

## 2022-05-04 RX ORDER — DROPERIDOL 2.5 MG/ML
0.62 INJECTION, SOLUTION INTRAMUSCULAR; INTRAVENOUS AS NEEDED
Status: DISCONTINUED | OUTPATIENT
Start: 2022-05-04 | End: 2022-05-04 | Stop reason: HOSPADM

## 2022-05-04 RX ORDER — AMITRIPTYLINE HYDROCHLORIDE 50 MG/1
50 TABLET, FILM COATED ORAL NIGHTLY
Status: DISCONTINUED | OUTPATIENT
Start: 2022-05-04 | End: 2022-05-10 | Stop reason: HOSPADM

## 2022-05-04 RX ORDER — BUPIVACAINE HCL/0.9 % NACL/PF 0.125 %
PLASTIC BAG, INJECTION (ML) EPIDURAL AS NEEDED
Status: DISCONTINUED | OUTPATIENT
Start: 2022-05-04 | End: 2022-05-04 | Stop reason: SURG

## 2022-05-04 RX ORDER — HYDROMORPHONE HYDROCHLORIDE 1 MG/ML
0.5 INJECTION, SOLUTION INTRAMUSCULAR; INTRAVENOUS; SUBCUTANEOUS
Status: DISCONTINUED | OUTPATIENT
Start: 2022-05-04 | End: 2022-05-04 | Stop reason: HOSPADM

## 2022-05-04 RX ORDER — NALOXONE HCL 0.4 MG/ML
0.4 VIAL (ML) INJECTION AS NEEDED
Status: DISCONTINUED | OUTPATIENT
Start: 2022-05-04 | End: 2022-05-04 | Stop reason: HOSPADM

## 2022-05-04 RX ORDER — SODIUM CHLORIDE 0.9 % (FLUSH) 0.9 %
10 SYRINGE (ML) INJECTION AS NEEDED
Status: DISCONTINUED | OUTPATIENT
Start: 2022-05-04 | End: 2022-05-04 | Stop reason: HOSPADM

## 2022-05-04 RX ORDER — FENTANYL CITRATE 50 UG/ML
50 INJECTION, SOLUTION INTRAMUSCULAR; INTRAVENOUS
Status: DISCONTINUED | OUTPATIENT
Start: 2022-05-04 | End: 2022-05-04 | Stop reason: HOSPADM

## 2022-05-04 RX ORDER — FENTANYL CITRATE 50 UG/ML
INJECTION, SOLUTION INTRAMUSCULAR; INTRAVENOUS
Status: COMPLETED
Start: 2022-05-04 | End: 2022-05-04

## 2022-05-04 RX ORDER — KETOROLAC TROMETHAMINE 30 MG/ML
15 INJECTION, SOLUTION INTRAMUSCULAR; INTRAVENOUS EVERY 6 HOURS PRN
Status: DISPENSED | OUTPATIENT
Start: 2022-05-04 | End: 2022-05-08

## 2022-05-04 RX ORDER — FAMOTIDINE 20 MG/1
20 TABLET, FILM COATED ORAL DAILY
Status: DISCONTINUED | OUTPATIENT
Start: 2022-05-04 | End: 2022-05-10 | Stop reason: HOSPADM

## 2022-05-04 RX ORDER — PROPOFOL 10 MG/ML
VIAL (ML) INTRAVENOUS AS NEEDED
Status: DISCONTINUED | OUTPATIENT
Start: 2022-05-04 | End: 2022-05-04 | Stop reason: SURG

## 2022-05-04 RX ORDER — SODIUM CHLORIDE, SODIUM LACTATE, POTASSIUM CHLORIDE, CALCIUM CHLORIDE 600; 310; 30; 20 MG/100ML; MG/100ML; MG/100ML; MG/100ML
9 INJECTION, SOLUTION INTRAVENOUS CONTINUOUS
Status: DISCONTINUED | OUTPATIENT
Start: 2022-05-04 | End: 2022-05-09

## 2022-05-04 RX ADMIN — Medication 100 MCG: at 12:01

## 2022-05-04 RX ADMIN — PROPOFOL 150 MG: 10 INJECTION, EMULSION INTRAVENOUS at 10:26

## 2022-05-04 RX ADMIN — WARFARIN SODIUM 3 MG: 3 TABLET ORAL at 17:55

## 2022-05-04 RX ADMIN — GABAPENTIN 300 MG: 300 CAPSULE ORAL at 16:09

## 2022-05-04 RX ADMIN — CEFAZOLIN SODIUM 2 G: 2 INJECTION, SOLUTION INTRAVENOUS at 10:37

## 2022-05-04 RX ADMIN — ACETAMINOPHEN 1000 MG: 500 TABLET ORAL at 08:58

## 2022-05-04 RX ADMIN — LIDOCAINE HYDROCHLORIDE 0.5 ML: 10 INJECTION, SOLUTION EPIDURAL; INFILTRATION; INTRACAUDAL; PERINEURAL at 08:58

## 2022-05-04 RX ADMIN — FAMOTIDINE 20 MG: 20 TABLET ORAL at 08:58

## 2022-05-04 RX ADMIN — ROCURONIUM BROMIDE 50 MG: 10 INJECTION, SOLUTION INTRAVENOUS at 10:26

## 2022-05-04 RX ADMIN — FENTANYL CITRATE 50 MCG: 50 INJECTION, SOLUTION INTRAMUSCULAR; INTRAVENOUS at 10:51

## 2022-05-04 RX ADMIN — AMITRIPTYLINE HYDROCHLORIDE 50 MG: 50 TABLET, FILM COATED ORAL at 20:55

## 2022-05-04 RX ADMIN — DEXAMETHASONE SODIUM PHOSPHATE 2 MG: 10 INJECTION, SOLUTION INTRAMUSCULAR; INTRAVENOUS at 10:30

## 2022-05-04 RX ADMIN — BUPIVACAINE HYDROCHLORIDE 10 ML: 2.5 INJECTION, SOLUTION EPIDURAL; INFILTRATION; INTRACAUDAL at 13:06

## 2022-05-04 RX ADMIN — DEXAMETHASONE SODIUM PHOSPHATE 4 MG: 4 INJECTION, SOLUTION INTRA-ARTICULAR; INTRALESIONAL; INTRAMUSCULAR; INTRAVENOUS; SOFT TISSUE at 10:38

## 2022-05-04 RX ADMIN — Medication 100 MCG: at 10:58

## 2022-05-04 RX ADMIN — BACLOFEN 10 MG: 10 TABLET ORAL at 20:55

## 2022-05-04 RX ADMIN — ACETAMINOPHEN 1000 MG: 500 TABLET ORAL at 16:09

## 2022-05-04 RX ADMIN — PREGABALIN 75 MG: 75 CAPSULE ORAL at 08:58

## 2022-05-04 RX ADMIN — PROPOFOL 25 MCG/KG/MIN: 10 INJECTION, EMULSION INTRAVENOUS at 10:30

## 2022-05-04 RX ADMIN — SODIUM CHLORIDE, POTASSIUM CHLORIDE, SODIUM LACTATE AND CALCIUM CHLORIDE 9 ML/HR: 600; 310; 30; 20 INJECTION, SOLUTION INTRAVENOUS at 08:58

## 2022-05-04 RX ADMIN — Medication 50 MCG: at 12:05

## 2022-05-04 RX ADMIN — VANCOMYCIN HYDROCHLORIDE 1500 MG: 10 INJECTION, POWDER, LYOPHILIZED, FOR SOLUTION INTRAVENOUS at 10:33

## 2022-05-04 RX ADMIN — FENTANYL CITRATE 50 MCG: 50 INJECTION, SOLUTION INTRAMUSCULAR; INTRAVENOUS at 13:09

## 2022-05-04 RX ADMIN — TRAMADOL HYDROCHLORIDE 50 MG: 50 TABLET, COATED ORAL at 17:58

## 2022-05-04 RX ADMIN — MELOXICAM 15 MG: 15 TABLET ORAL at 08:58

## 2022-05-04 RX ADMIN — TRANEXAMIC ACID 1000 MG: 10 INJECTION, SOLUTION INTRAVENOUS at 11:59

## 2022-05-04 RX ADMIN — FENTANYL CITRATE 50 MCG: 50 INJECTION, SOLUTION INTRAMUSCULAR; INTRAVENOUS at 10:26

## 2022-05-04 RX ADMIN — FENTANYL CITRATE 50 MCG: 50 INJECTION, SOLUTION INTRAMUSCULAR; INTRAVENOUS at 14:22

## 2022-05-04 RX ADMIN — Medication 50 MCG: at 11:24

## 2022-05-04 RX ADMIN — VANCOMYCIN HYDROCHLORIDE 1500 MG: 10 INJECTION, POWDER, LYOPHILIZED, FOR SOLUTION INTRAVENOUS at 09:16

## 2022-05-04 RX ADMIN — ROPIVACAINE HYDROCHLORIDE 10 ML/HR: 5 INJECTION, SOLUTION EPIDURAL; INFILTRATION; PERINEURAL at 13:08

## 2022-05-04 RX ADMIN — FENTANYL CITRATE 50 MCG: 0.05 INJECTION, SOLUTION INTRAMUSCULAR; INTRAVENOUS at 13:09

## 2022-05-04 RX ADMIN — SUGAMMADEX 200 MG: 100 INJECTION, SOLUTION INTRAVENOUS at 12:18

## 2022-05-04 RX ADMIN — CEFAZOLIN SODIUM 2 G: 2 INJECTION, SOLUTION INTRAVENOUS at 16:09

## 2022-05-04 RX ADMIN — ACETAMINOPHEN 1000 MG: 500 TABLET ORAL at 20:55

## 2022-05-04 RX ADMIN — FAMOTIDINE 20 MG: 20 TABLET ORAL at 16:10

## 2022-05-04 RX ADMIN — SODIUM CHLORIDE, POTASSIUM CHLORIDE, SODIUM LACTATE AND CALCIUM CHLORIDE 100 ML/HR: 600; 310; 30; 20 INJECTION, SOLUTION INTRAVENOUS at 16:08

## 2022-05-04 RX ADMIN — SODIUM CHLORIDE, POTASSIUM CHLORIDE, SODIUM LACTATE AND CALCIUM CHLORIDE: 600; 310; 30; 20 INJECTION, SOLUTION INTRAVENOUS at 11:59

## 2022-05-04 RX ADMIN — ONDANSETRON 4 MG: 2 INJECTION INTRAMUSCULAR; INTRAVENOUS at 12:01

## 2022-05-04 RX ADMIN — GABAPENTIN 300 MG: 300 CAPSULE ORAL at 20:55

## 2022-05-04 RX ADMIN — CEFAZOLIN SODIUM 2 G: 2 INJECTION, SOLUTION INTRAVENOUS at 21:49

## 2022-05-04 RX ADMIN — BACLOFEN 10 MG: 10 TABLET ORAL at 16:09

## 2022-05-04 RX ADMIN — MIDODRINE HYDROCHLORIDE 5 MG: 5 TABLET ORAL at 16:10

## 2022-05-04 RX ADMIN — MELOXICAM 15 MG: 7.5 TABLET ORAL at 16:10

## 2022-05-04 RX ADMIN — SERTRALINE 100 MG: 100 TABLET, FILM COATED ORAL at 16:09

## 2022-05-04 RX ADMIN — BUPIVACAINE HYDROCHLORIDE 30 ML: 2.5 INJECTION, SOLUTION EPIDURAL; INFILTRATION; INTRACAUDAL at 10:31

## 2022-05-04 RX ADMIN — TRANEXAMIC ACID 1000 MG: 10 INJECTION, SOLUTION INTRAVENOUS at 10:37

## 2022-05-04 RX ADMIN — BUPIVACAINE HYDROCHLORIDE 30 ML: 2.5 INJECTION, SOLUTION EPIDURAL; INFILTRATION; INTRACAUDAL; PERINEURAL at 10:30

## 2022-05-04 RX ADMIN — LIDOCAINE HYDROCHLORIDE 50 MG: 10 INJECTION, SOLUTION INFILTRATION; PERINEURAL at 10:26

## 2022-05-04 RX ADMIN — ATORVASTATIN CALCIUM 40 MG: 40 TABLET, FILM COATED ORAL at 20:55

## 2022-05-04 NOTE — PROGRESS NOTES
"Pharmacy Consult  -  Warfarin    Corrina Reis is a  56 y.o. female   Height - 172.7 cm (67.99\")  Weight - 105 kg (231 lb 7.7 oz)    Consulting Provider: Ortho  Indication: History of DVT/PE  Goal INR: 2-3  Home Regimen: 3 mg daily     Bridge Therapy: No    Drug-Drug Interactions with current regimen:     Meloxicam - may increase risk of bleeding      Tramadol - may increase risk of bleeding    Warfarin Dosing During Admission:    Date  5/4           INR  1.35           Dose  (3 mg)              Education Provided:    Discharge Follow up:   Following Provider -   Follow up time range or appointment -    Labs:    Results from last 7 days   Lab Units 05/04/22  0855 05/02/22  1208   INR  1.35* 1.52*   APTT seconds  --  33.8*   HEMOGLOBIN g/dL  --  13.0   HEMATOCRIT %  --  39.0     Results from last 7 days   Lab Units 05/02/22  1208   SODIUM mmol/L 140   POTASSIUM mmol/L 3.8   CHLORIDE mmol/L 103   CO2 mmol/L 27.0   BUN mg/dL 15   CREATININE mg/dL 1.05*   CALCIUM mg/dL 9.3   BILIRUBIN mg/dL 0.6   ALK PHOS U/L 79   ALT (SGPT) U/L 21   AST (SGOT) U/L 26   GLUCOSE mg/dL 109*     Current dietary intake: Patient just admitted, no PO intake documented yet. Will follow.    Diet Order   Procedures   • Diet Regular     Assessment/Plan:     Patient's INR is 1.35 today.  Continue home dose of warfarin 3 mg today.  Daily PT/INR ordered.  Monitor signs/symptoms of bleeding, dietary intake, and drug-drug interactions. Make dose adjustments as necessary.  Pharmacy will continue to follow.    Philomena Castillo, PharmD, BCPS  5/4/2022  15:43 EDT   "

## 2022-05-04 NOTE — ANESTHESIA PROCEDURE NOTES
Peripheral Block      Patient reassessed immediately prior to procedure    Patient location during procedure: OR  Reason for block: post-op pain management  Performed by  CRNA/CAA: Chino Stanley CRNA  Preanesthetic Checklist  Completed: patient identified, IV checked, site marked, surgical consent, monitors and equipment checked, pre-op evaluation and timeout performed  Prep:  Sterile barriers:gloves, cap, sterile barriers and mask  Prep: ChloraPrep  Patient monitoring: blood pressure monitoring, continuous pulse oximetry and EKG  Procedure    Guidance:ultrasound guided, nerve stimulator and landmark technique  Images:still images not obtained    Laterality:left  Block Type:femoral  Injection Technique:catheter  Needle Type:short-bevel  Needle Gauge:18 G  Resistance on Injection: none  Cath Depth at skin: 13 cm    Medications Used: bupivacaine PF (MARCAINE) 0.25 % injection, 10 mL  Med administered at 5/4/2022 1:06 PM      Post Assessment  Injection Assessment: negative aspiration for heme, no paresthesia on injection and incremental injection  Patient Tolerance:comfortable throughout block  Complications:no  Additional Notes  The BBRaun 360 degree echogenic needle was introduced in plane, in a lateral to medial direction at the level of the inguinal crease.  Under ultrasound guidance, the femoral artery and vein where located.  The needle was then directed below Fascia Iliacus towards the Femoral nerve.  NS was utilized to hydro dissect and kristel needle advancement towards the target structure.   LA was injected incrementally in 3-5 ml aliquots with negative aspirate.  LA spread was visualized around the nerve, negative intraneural injection, low injection pressures.  Thank you

## 2022-05-04 NOTE — PLAN OF CARE
Arrived from PACU at 15:02.  Patient required 2L NC to maintain saturation above 90%.  ROSELIA bnotified of HV unable to maintain  suction.  Evaluated site and manipulated to try to adjust without success.  Per recommendation HV will stay in place until MD rounds in AM.  Prevena would vac in place.  PNC at 10ml/hr.  Immobilizer placed in PACU.  Patient reported dizziness and weakness with ambulation.  VS remained WNL.  Will continue to monitor.

## 2022-05-04 NOTE — PLAN OF CARE
Problem: Adult Inpatient Plan of Care  Goal: Plan of Care Review  Flowsheets (Taken 5/4/2022 5714)  Progress: improving  Plan of Care Reviewed With: patient  Outcome Evaluation: PT eval complete. Pt ambulated 14 feet using RW and CGA x2. Gait limited by pt experiencing dizziness/LH, BP measured at 114/66 with symptoms improving with rest. RN present and aware. Bed mobility performed with min A and STS with CGA. KI to be donned for 2 weeks. Reviewed HEP and knee precautions. ADLs assessed, pt does not require OT eval at this time. Recommend pt d/c home with assist and OPPT.   Goal Outcome Evaluation:  Plan of Care Reviewed With: patient        Progress: improving  Outcome Evaluation: PT eval complete. Pt ambulated 14 feet using RW and CGA x2. Gait limited by pt experiencing dizziness/LH, BP measured at 114/66 with symptoms improving with rest. RN present and aware. Bed mobility performed with min A and STS with CGA. KI to be donned for 2 weeks. Reviewed HEP and knee precautions. ADLs assessed, pt does not require OT eval at this time. Recommend pt d/c home with assist and OPPT.

## 2022-05-04 NOTE — ANESTHESIA PROCEDURE NOTES
Peripheral Block      Patient reassessed immediately prior to procedure    Patient location during procedure: OR  Reason for block: at surgeon's request and post-op pain management  Performed by  CRNA/CAA: Chino Stanley, CRNA  Assisted by: Jose Riojas MD  Preanesthetic Checklist  Completed: patient identified, IV checked, site marked, risks and benefits discussed, surgical consent, monitors and equipment checked, pre-op evaluation and timeout performed  Prep:  Pt Position: supine  Sterile barriers:cap, gloves, mask, sterile barriers and washed/disinfected hands  Prep: ChloraPrep  Patient monitoring: blood pressure monitoring, continuous pulse oximetry and EKG  Procedure    Sedation: yes  Performed under: local infiltration  Guidance:ultrasound guided  Images:still images obtained, printed/placed on chart  Injection Technique:single-shot  Needle Type:echogenic and short-bevel  Needle Gauge:21 G  Resistance on Injection: none    Medications Used: dexamethasone sodium phosphate injection, 2 mg  bupivacaine PF (MARCAINE) 0.25 % injection, 30 mL  Med administered at 5/4/2022 10:30 AM      Medications  Comment:                       Post Assessment  Injection Assessment: negative aspiration for heme, no paresthesia on injection and incremental injection  Patient Tolerance:comfortable throughout block  Complications:no  Additional Notes  The pt was placed in  lateral position.   The pt was anesthetized with  IV Sedation( see meds).  With the use of ultrasound guidance, the popliteal artery was visualized above the shaft of the femur, the space between the popliteal artery and the femur was targeted for this procedure. The Insertion site was prepped in sterile fashion.  Skin and cutaneous tissue was infiltrated and anesthetized with 1% Lidocaine 3 mls via a 25g needle.  A BBraun 4 inch 20g echogenic needle was then  inserted approximately 2 cm proximal to the popliteal taz a at the lateral mid biceps femoris and  advanced In-plane with Ultrasound guidance.. Normal Saline PSF was utilized for hydrodissection of tissue.   LA injection spread was visualized between the popliteal artery and femur filling this space from medial to lateral,  injection was incremental 1-5ml, injection pressure was normal or little, no intraneural injection, no vascular injection.  Throughout the injection of the LA solution the sciatic nerve components were visualized, care was taken to keep any spread of the LA solution from reaching the sciatic nerve components. Thank you

## 2022-05-04 NOTE — ANESTHESIA PROCEDURE NOTES
Peripheral Block      Patient reassessed immediately prior to procedure    Patient location during procedure: OR  Reason for block: post-op pain management  Performed by  CRNA/CAA: Chino Stanley, CRNA  Assisted by: Jose Riojas MD  Preanesthetic Checklist  Completed: patient identified, IV checked, site marked, surgical consent, monitors and equipment checked, pre-op evaluation and timeout performed  Prep:  Sterile barriers:gloves, cap, sterile barriers and mask  Prep: ChloraPrep  Patient monitoring: blood pressure monitoring, continuous pulse oximetry and EKG  Procedure    Guidance:ultrasound guided, nerve stimulator and landmark technique  Images:still images not obtained    Block Type:femoral  Injection Technique:single-shot  Needle Type:short-bevel  Needle Gauge:20 G  Resistance on Injection: none    Medications Used: bupivacaine PF (MARCAINE) 0.25 % injection, 30 mL  Med administered at 5/4/2022 10:31 AM      Post Assessment  Injection Assessment: negative aspiration for heme, no paresthesia on injection and incremental injection  Patient Tolerance:comfortable throughout block  Complications:no  Additional Notes  The BBRaun 360 degree echogenic needle was introduced in plane, in a lateral to medial direction at the level of the inguinal crease.  Under ultrasound guidance, the femoral artery and vein where located.  The needle was then directed below Fascia Iliacus towards the Femoral nerve.  NS was utilized to hydro dissect and kristel needle advancement towards the target structure.   LA was injected incrementally in 3-5 ml aliquots with negative aspirate.  LA spread was visualized around the nerve, negative intraneural injection, low injection pressures.  Thank you

## 2022-05-04 NOTE — ANESTHESIA PREPROCEDURE EVALUATION
Anesthesia Evaluation     Patient summary reviewed and Nursing notes reviewed   history of anesthetic complications: PONV               Airway   Mallampati: II  TM distance: >3 FB  Neck ROM: full  No difficulty expected  Dental - normal exam     Pulmonary - normal exam   (+) pulmonary embolism (2014),   Cardiovascular - normal exam    (+) hypertension, DVT (2001, 2014; coumadin tx - last dose 6 days ago; INR = 1.3), hyperlipidemia,     ROS comment:   Echo 5/21: normal EF, mild TR    Neuro/Psych- negative ROS  GI/Hepatic/Renal/Endo    (+) morbid obesity, GERD,  thyroid problem hypothyroidism    Musculoskeletal (-) negative ROS    Abdominal  - normal exam    Bowel sounds: normal.   Substance History - negative use     OB/GYN negative ob/gyn ROS         Other                      Anesthesia Plan    ASA 3     spinal   (Peripheral nerve block + cath for post op pain relief)  intravenous induction     Anesthetic plan, all risks, benefits, and alternatives have been provided, discussed and informed consent has been obtained with: patient.    Plan discussed with CRNA.        CODE STATUS:

## 2022-05-04 NOTE — ANESTHESIA POSTPROCEDURE EVALUATION
Patient: Corrina Reis    Procedure Summary     Date: 05/04/22 Room / Location:  [facility]  [facility]    Anesthesia Start: 1007 Anesthesia Stop:     Procedure: IRRIGATION AND DEBRIDMENT, LEFT KNEE ANTIBIOTIC SPACER PLACEMENT (Left Knee) Diagnosis:     Surgeons: Rei Sutherland MD Provider: Jose Riojas MD    Anesthesia Type: spinal ASA Status: 3          Anesthesia Type: spinal    Vitals  Vitals Value Taken Time   BP 97/88 05/04/22 1255   Temp 97.5 °F (36.4 °C) 05/04/22 1244   Pulse 78 05/04/22 1301   Resp 16 05/04/22 1244   SpO2 95 % 05/04/22 1301   Vitals shown include unvalidated device data.        Post Anesthesia Care and Evaluation    Patient location during evaluation: PACU  Patient participation: complete - patient participated  Level of consciousness: awake and alert  Pain management: adequate  Airway patency: patent  Anesthetic complications: No anesthetic complications  PONV Status: none  Cardiovascular status: hemodynamically stable and acceptable  Respiratory status: nonlabored ventilation, acceptable and nasal cannula  Hydration status: acceptable

## 2022-05-04 NOTE — H&P
"Patient Name: Corrina Reis  MRN: [id number]  : 1965  DOS: 2022    Attending: Rei Sutherland MD    Primary Care Provider: Abbey Merrill MD      Chief complaint:  Left knee infection    Subjective   Patient is a pleasant 56 y.o. female presented for scheduled surgery by Dr. Sutherland.  She underwent irrigation and debridement, left knee antibiotic spacer placement under spinal anesthesia.  She tolerated surgery well and was admitted for further medical management.  She reports having had a fall in 2021.  At that time she started having pain, swelling and warmth to her left knee.  At that time she underwent \"cleanout\" and was placed on a month of IV vancomycin for MRSA followed by 6 weeks of doxycycline.  She continued to have pain and intermittent swelling.  She has used a walker since her fall.    When seen postop she is doing well.  Her pain is well controlled.  She denies nausea, shortness of breath or chest pain.  She does have history of DVT and PE.    Allergies:  Allergies   Allergen Reactions   • Penicillins Hives   • Sulfa Antibiotics Hives       Meds:  Medications Prior to Admission   Medication Sig Dispense Refill Last Dose   • amitriptyline (ELAVIL) 50 MG tablet Take 1 tablet by mouth every night at bedtime.   5/3/2022 at Unknown time   • atorvastatin (LIPITOR) 40 MG tablet Take 40 mg by mouth Every Night.   5/3/2022 at Unknown time   • baclofen (LIORESAL) 10 MG tablet Take 10 mg by mouth 3 (Three) Times a Day.   5/3/2022 at Unknown time   • cyclobenzaprine (FLEXERIL) 5 MG tablet Take 5 mg by mouth 3 (Three) Times a Day As Needed.   5/3/2022 at Unknown time   • Diclofenac Sodium (VOLTAREN) 1 % gel gel Apply  topically to the appropriate area as directed As Needed.   5/3/2022 at Unknown time   • diphenhydrAMINE (BENADRYL) 25 mg capsule Take 25 mg by mouth As Needed for Allergies.   Past Month at Unknown time   • famotidine (PEPCID) 20 MG tablet Take 20 mg by mouth 2 (two) " times a day.   5/3/2022 at Unknown time   • gabapentin (NEURONTIN) 300 MG capsule Take 300 mg by mouth 3 (Three) Times a Day.   5/3/2022 at Unknown time   • levothyroxine (SYNTHROID, LEVOTHROID) 100 MCG tablet Take 100 mcg by mouth Every Morning.   Past Month at Unknown time   • lidocaine (LIDODERM) 5 % Place 1 patch on the skin as directed by provider As Needed.   Past Month at Unknown time   • midodrine (PROAMATINE) 5 MG tablet Take 5 mg by mouth 3 (Three) Times a Day.   5/3/2022 at Unknown time   • sertraline (ZOLOFT) 100 MG tablet Take 100 mg by mouth Daily.   5/3/2022 at Unknown time   • sulindac (CLINORIL) 150 MG tablet Take 150 mg by mouth Daily.   5/3/2022 at Unknown time   • hydroCHLOROthiazide (MICROZIDE) 12.5 MG capsule Take 1 capsule by mouth Daily. Stopped for surgery per pt   04/29/22   • OnabotulinumtoxinA (Botox) 200 units reconstituted solution Botox 200 unit injection   INJECT 200 UNITS INTRAMUSCULARLY EVERY 3 MONTHS   More than a month at Unknown time   • warfarin (COUMADIN) 3 MG tablet Take 3 mg by mouth Every Night. Stopped for surgery   04/28/22         History:   Past Medical History:   Diagnosis Date   • Anxiety    • Borderline diabetes     daily at home checks   • Deep vein thrombosis (HCC)     left leg 2001 and 2014   • GERD (gastroesophageal reflux disease)    • History of migraine     botox shots for migraines   • History of pulmonary embolus (PE) 2014   • History of transfusion     autologus blood with back surgery   • Hyperlipidemia    • Hypertension    • IBS (irritable bowel syndrome)    • MRSA infection 04/2021    MRSA of left knee; IV antibioitcs x 4 weeks and then 6 weeks of PO antibiotics   • PONV (postoperative nausea and vomiting)    • Pyogenic arthritis of left hip (HCC) 04/2021   • Thyroid nodule     nodules are monitored     Past Surgical History:   Procedure Laterality Date   • APPENDECTOMY  1978   • BACK SURGERY      Daniel removed   • CHOLECYSTECTOMY  1997   • COLONOSCOPY    "  • ENDOMETRIAL ABLATION     • HYSTERECTOMY  1998   • SPINAL CORD STIMULATOR IMPLANT  2013   • SPINAL FUSION  1978   • THORACIC OUTLET SURGERY      1994-partial removal of sternum 1995-rib removal     Family History   Problem Relation Age of Onset   • Heart disease Mother    • Atrial fibrillation Mother    • Diabetes Mother    • No Known Problems Father    • No Known Problems Sister    • Hypertension Sister    • Autoimmune disease Sister    • Diabetes Sister    • Graves' disease Sister    • Lung cancer Brother    • Rectal cancer Brother    • Diabetes Brother    • Hypertension Brother    • Graves' disease Brother    • Hypertension Brother    • Heart attack Brother      Social History     Tobacco Use   • Smoking status: Never Smoker   • Smokeless tobacco: Never Used   Vaping Use   • Vaping Use: Never used   Substance Use Topics   • Alcohol use: Not Currently   • Drug use: Not Currently   She is  with 1 child.  She is a retired teacher.  She plans to go home with her sister postoperatively.    Review of Systems  Pertinent items are noted in HPI, all other systems reviewed and negative    Vital Signs  /72 (BP Location: Left arm, Patient Position: Lying)   Pulse 78   Temp 98.4 °F (36.9 °C) (Temporal)   Resp 16   Ht 172.7 cm (67.99\")   Wt 105 kg (231 lb 7.7 oz)   SpO2 95%   BMI 35.21 kg/m²     Physical Exam:    General Appearance:    Alert, cooperative, in no acute distress   Head:    Normocephalic, without obvious abnormality, atraumatic   Eyes:            Lids and lashes normal, conjunctivae and sclerae normal, no   icterus, no pallor, corneas clear,    Ears:    Ears appear intact with no abnormalities noted   Throat:   No oral lesions, no thrush, oral mucosa moist   Neck:   No adenopathy, supple, trachea midline, no thyromegaly    Lungs:     Clear to auscultation,respirations regular, even and unlabored    Heart:    Regular rhythm and normal rate, normal S1 and S2, no murmur, no gallop   Abdomen:    "  Normal bowel sounds, no masses, no organomegaly, soft non-tender, non-distended, no guarding, no rebound  tenderness   Genitalia:    Deferred   Extremities:  Left knee Ace wrap CDI.  Hemovac present.  Wound VAC present.  Nerve block present   Pulses:   Pulses palpable and equal bilaterally   Skin:   No bleeding, bruising or rash   Neurologic:   Cranial nerves 2 - 12 grossly intact. Flexion and dorsiflexion intact bilateral feet.        I reviewed the patient's new clinical results.       Results from last 7 days   Lab Units 05/02/22  1208   WBC 10*3/mm3 5.11   HEMOGLOBIN g/dL 13.0   HEMATOCRIT % 39.0   PLATELETS 10*3/mm3 224     Results from last 7 days   Lab Units 05/02/22  1208   SODIUM mmol/L 140   POTASSIUM mmol/L 3.8   CHLORIDE mmol/L 103   CO2 mmol/L 27.0   BUN mg/dL 15   CREATININE mg/dL 1.05*   CALCIUM mg/dL 9.3   BILIRUBIN mg/dL 0.6   ALK PHOS U/L 79   ALT (SGPT) U/L 21   AST (SGOT) U/L 26   GLUCOSE mg/dL 109*     Lab Results   Component Value Date    HGBA1C 5.30 05/02/2022         Assessment and Plan:     S/P irrigation and debridement, left knee antibiotic spacer placement    Infection of left knee (HCC)    Autonomic orthostatic hypotension    Hyperlipidemia    Hypothyroid    History of DVT (deep vein thrombosis)      Plan  1. PT/OT- Knee immobilizer for first 2 weeks no knee flexion for the first 2 weeks  2. Pain control-prns   3. IS-encourage  4. DVT proph- Mechs/coumadin  5. Bowel regimen  6. Resume home medications as appropriate  7. Monitor post-op labs  8. DC planning for home    Consult [facility] for abx management. Follow op cultures    Hypotension, Hyperlipidemia  - Continue home statin, and midodrine   -Hold HCTZ for now  - Monitor BP   - Holding parameters for BP meds  - Labetalol PRN for SBP>170    Hypothyroid  -Continue home Synthroid      SACHIN Mo  05/04/22  16:17 EDT

## 2022-05-04 NOTE — ANESTHESIA PROCEDURE NOTES
Airway  Urgency: elective    Date/Time: 5/4/2022 10:28 AM  Airway not difficult    General Information and Staff    Patient location during procedure: OR  SRNA: Ludy Forrest SRNA  Indications and Patient Condition  Indications for airway management: airway protection    Preoxygenated: yes  MILS not maintained throughout  Mask difficulty assessment: 1 - vent by mask    Final Airway Details  Final airway type: endotracheal airway      Successful airway: ETT  Cuffed: yes   Successful intubation technique: direct laryngoscopy  Endotracheal tube insertion site: oral  Blade: Evie  Blade size: 3  ETT size (mm): 7.5  Cormack-Lehane Classification: grade I - full view of glottis  Placement verified by: chest auscultation and capnometry   Measured from: lips  ETT/EBT  to lips (cm): 22  Number of attempts at approach: 1  Assessment: lips, teeth, and gum same as pre-op and atraumatic intubation    Additional Comments  Negative epigastric sounds, Breath sound equal bilaterally with symmetric chest rise and fall

## 2022-05-04 NOTE — H&P
Pre-Op H&P  Corrina Reis  [id number]  1965    Chief complaint: Left knee pain and swelling, history of MRSA infection    HPI:    Patient is a 56 y.o.female who presents with left knee pain and swelling and a history of left knee MRSA infection. Surgical intervention is recommended and she is agreeable. She is here today for irrigation and debridement left knee and placement of antibiotic spacer.    Review of Systems:  General ROS: negative for chills, fever or skin lesions;  No changes since last office visit.  Neg for recent sick exposure  Cardiovascular ROS: no chest pain or dyspnea on exertion; +HTN, +HLD, DVT (2001, 2014 coumadin tx- last dose 6 days ago)  Respiratory ROS: no cough, shortness of breath, or wheezing; +PE (2014)  Endocrine ROS: +hypothyroidism    Allergies:   Allergies   Allergen Reactions   • Penicillins Hives   • Sulfa Antibiotics Hives       Home Meds:    No current facility-administered medications on file prior to encounter.     Current Outpatient Medications on File Prior to Encounter   Medication Sig Dispense Refill   • amitriptyline (ELAVIL) 50 MG tablet Take 1 tablet by mouth every night at bedtime.     • atorvastatin (LIPITOR) 40 MG tablet Take 40 mg by mouth Every Night.     • baclofen (LIORESAL) 10 MG tablet Take 10 mg by mouth 3 (Three) Times a Day.     • famotidine (PEPCID) 20 MG tablet Take 20 mg by mouth 2 (two) times a day.     • gabapentin (NEURONTIN) 300 MG capsule Take 300 mg by mouth 3 (Three) Times a Day.     • levothyroxine (SYNTHROID, LEVOTHROID) 100 MCG tablet Take 100 mcg by mouth Every Morning.     • lidocaine (LIDODERM) 5 % Place 1 patch on the skin as directed by provider As Needed.     • sertraline (ZOLOFT) 100 MG tablet Take 100 mg by mouth Daily.     • warfarin (COUMADIN) 3 MG tablet Take 3 mg by mouth Every Night. Stopped for surgery         PMH:   Past Medical History:   Diagnosis Date   • Anxiety    • Borderline diabetes     daily at home checks   •  Deep vein thrombosis (HCC)     left leg 2001 and 2014   • GERD (gastroesophageal reflux disease)    • History of migraine     botox shots for migraines   • History of pulmonary embolus (PE) 2014   • History of transfusion     autologus blood with back surgery   • Hyperlipidemia    • Hypertension    • IBS (irritable bowel syndrome)    • MRSA infection 04/2021    MRSA of left knee; IV antibioitcs x 4 weeks and then 6 weeks of PO antibiotics   • PONV (postoperative nausea and vomiting)    • Pyogenic arthritis of left hip (HCC) 04/2021   • Thyroid nodule     nodules are monitored     PSH:    Past Surgical History:   Procedure Laterality Date   • APPENDECTOMY  1978   • BACK SURGERY      Daniel removed   • CHOLECYSTECTOMY  1997   • COLONOSCOPY     • ENDOMETRIAL ABLATION     • HYSTERECTOMY  1998   • SPINAL CORD STIMULATOR IMPLANT  2013   • SPINAL FUSION  1978   • THORACIC OUTLET SURGERY      1994-partial removal of sternum 1995-rib removal       Social History:   Tobacco:   Social History     Tobacco Use   Smoking Status Never Smoker   Smokeless Tobacco Never Used      Alcohol:     Social History     Substance and Sexual Activity   Alcohol Use Not Currently       Physical Exam:  General Appearance:    Alert, cooperative, no distress, appears stated age   Head:    Normocephalic, without obvious abnormality, atraumatic   Lungs:     Clear to auscultation bilaterally, respirations unlabored    Heart:   Regular rate and rhythm, S1 and S2 normal, no murmur, rub    or gallop    Abdomen:    Soft, non-tender, +bowel sounds   Breast Exam:    deferred   Genitalia:    deferred   Extremities:   Extremities normal, atraumatic, no cyanosis or edema with exception of left knee- mildly edematous compared to contralateral side   Skin:   Skin color, texture, turgor normal, no rashes or lesions   Neurologic:   Grossly intact   Results Review  LABS:  Lab Results   Component Value Date    WBC 5.11 05/02/2022    HGB 13.0 05/02/2022    HCT 39.0  05/02/2022    MCV 96.1 05/02/2022     05/02/2022    NEUTROABS 3.06 05/02/2022    GLUCOSE 109 (H) 05/02/2022    BUN 15 05/02/2022    CREATININE 1.05 (H) 05/02/2022     05/02/2022    K 3.8 05/02/2022     05/02/2022    CO2 27.0 05/02/2022    CALCIUM 9.3 05/02/2022    ALBUMIN 4.70 05/02/2022    AST 26 05/02/2022    ALT 21 05/02/2022    BILITOT 0.6 05/02/2022    PTT 33.8 (L) 05/02/2022    INR 1.52 (H) 05/02/2022       I reviewed the patient's new clinical results.    Cancer Staging (if applicable)  Cancer Patient: __ yes _X_no __unknown; If yes, clinical stage T:__ N:__M:__, stage group or __N/A    Impression:  1. Left knee pain and swelling  2. History of left knee MRSA infection    Plan: Irrigation and debridement left knee antibiotic spacer      Sheba Quinn, SACHIN   05/04/22   7:55 AM EDT

## 2022-05-04 NOTE — THERAPY EVALUATION
Patient Name: Corrina Reis  : 1965    MRN: [id number]                              Today's Date: 2022       Admit Date: 2022    Visit Dx:     ICD-10-CM ICD-9-CM   1. Infection of left knee (HCC)  M00.9 686.9     Patient Active Problem List   Diagnosis   • Autonomic orthostatic hypotension   • Infection of left knee (HCC)     Past Medical History:   Diagnosis Date   • Anxiety    • Borderline diabetes     daily at home checks   • Deep vein thrombosis (HCC)     left leg  and    • GERD (gastroesophageal reflux disease)    • History of migraine     botox shots for migraines   • History of pulmonary embolus (PE)    • History of transfusion     autologus blood with back surgery   • Hyperlipidemia    • Hypertension    • IBS (irritable bowel syndrome)    • MRSA infection 2021    MRSA of left knee; IV antibioitcs x 4 weeks and then 6 weeks of PO antibiotics   • PONV (postoperative nausea and vomiting)    • Pyogenic arthritis of left hip (HCC) 2021   • Thyroid nodule     nodules are monitored     Past Surgical History:   Procedure Laterality Date   • APPENDECTOMY     • BACK SURGERY      Daniel removed   • CHOLECYSTECTOMY     • COLONOSCOPY     • ENDOMETRIAL ABLATION     • HYSTERECTOMY     • SPINAL CORD STIMULATOR IMPLANT     • SPINAL FUSION     • THORACIC OUTLET SURGERY      -partial removal of sternum -rib removal      General Information     Row Name 22 1345          Physical Therapy Time and Intention    Document Type evaluation  -TED     Mode of Treatment individual therapy;physical therapy  -TED     Row Name 22 1345          General Information    Patient Profile Reviewed yes  -TED     Prior Level of Function min assist:;all household mobility;transfer;bed mobility;ADL's  -TED     Existing Precautions/Restrictions hip;brace worn when out of bed;other (see comments)  wound vac, hemovac, femoral nerve cath; KI for 2 weeks  -TED     Barriers to Rehab  medically complex  -     Row Name 05/04/22 1345          Living Environment    People in Home spouse  -     Row Name 05/04/22 1345          Home Main Entrance    Number of Stairs, Main Entrance none  -     Row Name 05/04/22 1345          Stairs Within Home, Primary    Stairs, Within Home, Primary 0  -TED     Number of Stairs, Within Home, Primary none  -TED     Row Name 05/04/22 1345          Cognition    Orientation Status (Cognition) oriented x 4  -     Row Name 05/04/22 1345          Safety Issues, Functional Mobility    Safety Issues Affecting Function (Mobility) safety precaution awareness;safety precautions follow-through/compliance  -     Impairments Affecting Function (Mobility) endurance/activity tolerance;strength;pain;range of motion (ROM)  -           User Key  (r) = Recorded By, (t) = Taken By, (c) = Cosigned By    Initials Name Provider Type    Lev Cat, PT Physical Therapist               Mobility     Row Name 05/04/22 1345          Bed Mobility    Bed Mobility scooting/bridging;supine-sit;sit-supine  -     Scooting/Bridging [place] (Bed Mobility) verbal cues;minimum assist (75% patient effort)  -     Supine-Sit [place] (Bed Mobility) verbal cues;minimum assist (75% patient effort)  -     Sit-Supine [place] (Bed Mobility) verbal cues;minimum assist (75% patient effort)  -     Assistive Device (Bed Mobility) head of bed elevated;bed rails  -     Comment, (Bed Mobility) Min A for LE management off of/onto EOB and trunk control into sitting/supine; KI already donned  -     Row Name 05/04/22 1345          Sit-Stand Transfer    Sit-Stand [place] (Transfers) verbal cues;contact guard  -     Assistive Device (Sit-Stand Transfers) walker, front-wheeled  -     Comment, (Sit-Stand Transfer) Verbal cues for safe hand placement during standing/sitting and moving L LE out for comfort prior to sitting  -     Row Name 05/04/22 1345          Gait/Stairs  (Locomotion)    [place] Level (Gait) verbal cues;contact guard;2 person assist  -     Assistive Device (Gait) walker, front-wheeled  -     Distance in Feet (Gait) 14  -TED     Deviations/Abnormal Patterns (Gait) bilateral deviations;kelsey decreased;gait speed decreased;stride length decreased  -     Bilateral Gait Deviations forward flexed posture  -     Left Sided Gait Deviations weight shift ability decreased;heel strike decreased  -     [place] Level (Stairs) not tested  -     Comment, (Gait/Stairs) Pt ambulated with step through pattern and decreased speed. Verbal cues for maintaning upright posture, body within walker, and increase WB through LEs. Gait limited by pt experiencing dizziness/LH, BP measured at 114/66 with symptoms improving with rest. RN present and aware.  -     Row Name 05/04/22 1345          Mobility    Extremity Weight-bearing Status left lower extremity  -     Left Lower Extremity (Weight-bearing Status) weight-bearing as tolerated (WBAT)  -           User Key  (r) = Recorded By, (t) = Taken By, (c) = Cosigned By    Initials Name Provider Type    TED Lev Harper, PT Physical Therapist               Obj/Interventions     [facility] Name 05/04/22 1345          Range of Motion Comprehensive    General Range of Motion lower extremity range of motion deficits identified  -     Comment, General Range of Motion R LE AROM WFL; L LE in KI; able to actively DF/PF  -[facility] Name 05/04/22 1345          Strength Comprehensive (MMT)    General Manual Muscle Testing (MMT) Assessment lower extremity strength deficits identified  -     Comment, General Manual Muscle Testing (MMT) Assessment R LE functionally 4+/5; L LE functionally 4-/5;  -     Row Name 05/04/22 1345          Motor Skills    Therapeutic Exercise hip;knee;ankle  -[facility] Name 05/04/22 1345          Hip (Therapeutic Exercise)    Hip (Therapeutic Exercise) isometric exercises  -     Hip Isometrics (Therapeutic  Exercise) gluteal sets;10 repetitions  -     Row Name 05/04/22 1345          Knee (Therapeutic Exercise)    Knee (Therapeutic Exercise) isometric exercises  -     Knee Isometrics (Therapeutic Exercise) quad sets;10 repetitions  -     Row Name 05/04/22 1345          Ankle (Therapeutic Exercise)    Ankle (Therapeutic Exercise) AROM (active range of motion)  -     Ankle AROM (Therapeutic Exercise) bilateral;dorsiflexion;plantarflexion;10 repetitions  -     Row Name 05/04/22 1345          Sensory Assessment (Somatosensory)    Sensory Assessment (Somatosensory) LE sensation intact  -           User Key  (r) = Recorded By, (t) = Taken By, (c) = Cosigned By    Initials Name Provider Type    TED Lev Harper, PT Physical Therapist               Goals/Plan     Row Name 05/04/22 1345          Bed Mobility Goal 1 (PT)    Activity/Assistive Device (Bed Mobility Goal 1, PT) sit to supine/supine to sit  -TED     [place] Level/Cues Needed (Bed Mobility Goal 1, PT) contact guard required  -TED     Time Frame (Bed Mobility Goal 1, PT) long term goal (LTG);3 days  -     Row Name 05/04/22 1345          Transfer Goal 1 (PT)    Activity/Assistive Device (Transfer Goal 1, PT) sit-to-stand/stand-to-sit;walker, rolling  -TED     [place] Level/Cues Needed (Transfer Goal 1, PT) modified independence  -TED     Time Frame (Transfer Goal 1, PT) long term goal (LTG);3 days  -     Row Name 05/04/22 1345          Gait Training Goal 1 (PT)    Activity/Assistive Device (Gait Training Goal 1, PT) gait (walking locomotion);walker, rolling  -TED     [place] Level (Gait Training Goal 1, PT) modified independence  -TED     Distance (Gait Training Goal 1, PT) 150 feet  -TED     Time Frame (Gait Training Goal 1, PT) long term goal (LTG);3 days  -     Row Name 05/04/22 1345          Therapy Assessment/Plan (PT)    Planned Therapy Interventions (PT) balance training;bed mobility training;gait training;home exercise  program;patient/family education;transfer training;stair training;strengthening  -           User Key  (r) = Recorded By, (t) = Taken By, (c) = Cosigned By    Initials Name Provider Type    Lev Cat, [place] Physical Therapist               Clinical Impression     Row Name 05/04/22 1348          Pain    Pretreatment Pain Rating 4/10  -TED     Posttreatment Pain Rating 5/10  -TED     Pain Location - Side/Orientation Left  -TED     Pain Location posterior  -TED     Pain Location - knee  -TED     Pain Intervention(s) Ambulation/increased activity;Repositioned  -TED     Row Name 05/04/22 1348          Therapy Assessment/Plan (PT)    Patient/Family Therapy Goals Statement (PT) To return home  -TED     Rehab Potential (PT) good, to achieve stated therapy goals  -TED     Criteria for Skilled Interventions Met (PT) yes;meets criteria;skilled treatment is necessary  -TED     Therapy Frequency (PT) 2 times/day  -TED     Row Name 05/04/22 1345          Positioning and Restraints    Pre-Treatment Position in bed  -TED     Post Treatment Position bed  -TED     In Bed notified nsg;fowlers;call light within reach;encouraged to call for assist;exit alarm on;with family/caregiver  -           User Key  (r) = Recorded By, (t) = Taken By, (c) = Cosigned By    Initials Name Provider Type    Lev Cat, [place] Physical Therapist               Outcome Measures     Row Name 05/04/22 1348          How much help from another person do you currently need...    Turning from your back to your side while in flat bed without using bedrails? 2  -TED     Moving from lying on back to sitting on the side of a flat bed without bedrails? 2  -TED     Moving to and from a bed to a chair (including a wheelchair)? 3  -TED     Standing up from a chair using your arms (e.g., wheelchair, bedside chair)? 3  -TED     Climbing 3-5 steps with a railing? 3  -TED     To walk in hospital room? 3  -TED     AM-PAC 6 Clicks Score (PT) 16  -TED     Highest level of mobility 5 -->  Static standing  -     Row Name 05/04/22 1345          Functional Assessment    Outcome Measure Options AM-PAC 6 Clicks Basic Mobility (PT)  -           User Key  (r) = Recorded By, (t) = Taken By, (c) = Cosigned By    Initials Name Provider Type    Lev Cat, PT Physical Therapist                             Physical Therapy Education                 Title: PT OT SLP Therapies (Done)     Topic: Physical Therapy (Done)     Point: Mobility training (Done)     Learning Progress Summary           Patient Acceptance, E,D, VU by TED at 5/4/2022 1345    Comment: Educated on safe sequencing with bed mobility, ambulatory transfers, and gait. Reviewed HEP and knee precautions.                   Point: Home exercise program (Done)     Learning Progress Summary           Patient Acceptance, E,D, VU by TED at 5/4/2022 1345    Comment: Educated on safe sequencing with bed mobility, ambulatory transfers, and gait. Reviewed HEP and knee precautions.                   Point: Body mechanics (Done)     Learning Progress Summary           Patient Acceptance, E,D, VU by TED at 5/4/2022 1345    Comment: Educated on safe sequencing with bed mobility, ambulatory transfers, and gait. Reviewed HEP and knee precautions.                   Point: Precautions (Done)     Learning Progress Summary           Patient Acceptance, E,D, VU by TED at 5/4/2022 1345    Comment: Educated on safe sequencing with bed mobility, ambulatory transfers, and gait. Reviewed HEP and knee precautions.                               User Key     Initials Effective Dates Name Provider Type Discipline    TED 06/16/21 -  Lev Harper, PT Physical Therapist PT              PT Recommendation and Plan  Planned Therapy Interventions (PT): balance training, bed mobility training, gait training, home exercise program, patient/family education, transfer training, stair training, strengthening  Plan of Care Reviewed With: patient  Progress: improving  Outcome Evaluation: PT  eval complete. Pt ambulated 14 feet using RW and CGA x2. Gait limited by pt experiencing dizziness/LH, BP measured at 114/66 with symptoms improving with rest. RN present and aware. Bed mobility performed with min A and STS with CGA. KI to be donned for 2 weeks. Reviewed HEP and knee precautions. ADLs assessed, pt does not require OT eval at this time. Recommend pt d/c home with assist and OPPT.     Time Calculation:    PT Charges     Row Name 05/04/22 1345             Time Calculation    Start Time 1345  -TED      PT Received On 05/04/22  -TED      PT Goal Re-Cert Due Date 05/14/22  -TED              Time Calculation- PT    Total Timed Code Minutes- PT 20 minute(s)  -TED              Timed Charges    71658 - PT Therapeutic Exercise Minutes 4  -TED      48504 - Gait Training Minutes  10  -TED      97308 - PT Therapeutic Activity Minutes 6  -TED              Untimed Charges    PT Eval/Re-eval Minutes 46  -TED              Total Minutes    Timed Charges Total Minutes 20  -TED      Untimed Charges Total Minutes 46  -TED       Total Minutes 66  -TED            User Key  (r) = Recorded By, (t) = Taken By, (c) = Cosigned By    Initials Name Provider Type    Lev Cat, PT Physical Therapist              Therapy Charges for Today     Code Description Service Date Service Provider Modifiers Qty    14489442602 HC GAIT TRAINING EA 15 MIN 5/4/2022 Lev Harper, PT GP 1    39078176793 HC PT EVAL LOW COMPLEXITY 4 5/4/2022 Lev Harper, PT GP 1    60624244146 HC PT THER SUPP EA 15 MIN 5/4/2022 Lev Harper, PT GP 3          PT G-Codes  Outcome Measure Options: AM-PAC 6 Clicks Basic Mobility (PT)  AM-PAC 6 Clicks Score (PT): 16    Lev Harper PT  5/4/2022

## 2022-05-04 NOTE — DISCHARGE INSTRUCTIONS
INCISION CARE incisional wound VAC hip and Knee:  You have a sterile incisional wound VAC dressing in place. Only exchange if it becomes saturated (fluid draining out the sides of dressing) and notify the office. The dressing is water resistant. During the first 7 days after surgery, it is ok to shower. DO NOT scrub on or around the dressing. Do not submerge the dressing.  After 7 days, the incisional wound VAC will turn off and lose its suction you can remove your dressing.  You will have been given a fresh dressing to cover the wound with until your follow-up at 2 weeks. you will have staples or sutures in place on your skin. It is OK to shower with the new dressing in place. DO NOT scrub on or around the incision. DO NOT submerge the incision in pools, baths, or hot tubs for 1 month after surgery. Do not touch or pick at the incision. If you have any drainage from the incision after 7 days, cover the incision with sterile gauze and paper tape and alert the office.   Staples will be removed between 10-14 days postoperation.  This may be done by either the home health nurse, rehabilitation nurse, or during your return visit to Dr. Sutherland's office.  No creams or ointments to the incision for 1 month after surgery. After 1 month, it is recommended to massage the scar with vitamin E cream to help decrease scar formation.  Check incision every day and notify surgeon immediately if any of the following signs or symptoms are noted:  Increase in redness  Increase in swelling around the incision and of the entire extremity  Increase in pain  Drainage oozing from the incision  Pulling apart of the edges of the incision  Increase in overall body temperature (greater than 100.5 degrees)    Anticoagulants: You will be discharged on an anticoagulant. This is a prophylactic medication that helps prevent blood clots during your post-operative period. Most patients will be on  Aspirin 81 mg Enteric coated every 12 hours orally for  30 days. Some patients, due to increased risk factors, will need to be on a stronger anticoagulant. Dr. Sutherland will discuss this need with you.   While taking the anticoagulant, you should avoid taking any additional aspirin, and limit ibuprofen (Advil or Motrin), Aleve (Naprosyn) or other non-steroidal anti-inflammatory medications.   Notify your surgeon immediately if any fran bleeding is noted in the urine, stool, vomit, or from the nose or the incision. Blood in the stool will often appear as black rather than red. Blood in urine may appear as pink. Blood in vomit may appear as brown/black like coffee grounds.  You will need to apply pressure for longer periods of time to any cuts or abrasions to stop bleeding  Avoid alcohol while taking anticoagulants  Sequential Compression Device: You maybe be discharged home with a compression device that helps promote blood flow and prevent clots in your legs. Wear these at all times for the first two weeks.   Mobilization: The best way to avoid a blood clot is to get up and walk. 10 times a day get up and walk for 5 minutes for the first two weeks. Walking for longer periods of time will increase pain and swelling, making therapy more difficult. If taking any long travel (car or plane) in the first 1-2 months, be sure to get up and walk at least every one hour.     Stool Softeners: You will be at greater risk of constipation after surgery because of being less mobile and taking the pain medications.   Take stool softeners as instructed by your surgeon while on pain medications. Use over the counter Colace 100 mg 1-2 capsules twice daily.   If stools become too loose or too frequent, decreases the dosage or stop the stool softener.  If constipation occurs despite use of stool softeners, you are to continue the stool softeners and add a laxative (Milk of Magnesia 1 ounce daily as needed).  Dulcolax oral tabs or suppository or a fleets enema can also be utilized for  constipation and can be obtained over the counter.   If above interventions are unsuccessful in inducing bowel movements, please contact your family physician's office/surgeon's office.  Drink plenty of fluids and eat fruits and vegetables during your recovery time    Pain Medications utilized after surgery are narcotics and the law requires that the following information be given to all patients that are prescribed narcotics:  CLASSIFICATION: Pain medications are called Opioids and are narcotics  LEGALITIES: It is illegal to share narcotics with others and to drive within 24 hours of taking narcotics  POTENTIAL SIDE EFFECTS: Potential side effects of opioids include: nausea, vomiting, itching, dizziness, drowsiness, dry mouth, constipation, and difficulty urinating.  POTENTIAL ADVERSE EFFECTS:   Opioid tolerance can develop with use of pain medications and this simply means that it requires more and more of the medication to control pain; however, this is seen more in patients that use Opioids for longer periods of time.  Opioid dependence can develop with use of Opioids and this simply means that to stop the medication can cause withdrawal symptoms; however, this is seen with patients that use Opioids for longer periods of time.  Opioid addiction can develop with use of Opioids and the incidence of this is very unlikely in patients who take the medications as ordered and stop the medications as instructed.  Opioid overdose can be dangerous, but is unlikely when the medication is taken as ordered and stopped when ordered. It is important not to mix opioids with alcohol or with any type of sedative, such as Benadryl, as this can lead to over sedation and respiratory difficulty.  DOSAGE:   Pain medications may be needed consistently for the first week to decrease pain and promote adequate pain relief and participation in physical therapy.  After the initial surgical pain begins to resolve, you may begin to decrease  the pain medication and only take it as needed. By the end of 6 weeks, you should be off of pain medications.  You can decrease your pain medication consumption by slowly spacing out the time in between the medication, and using 650mg Tylenol when the pain is not as severe. Do not exceed 3500mg of Tylenol in 24 hours.   Refills will not be given by the office during evening hours, on weekends, or after 6 weeks post-op.  To seek refills on pain medications during the initial 6 week post-operative period, you must call the office 48 hours in advance to request the refill. The office will then notify you when to  the prescription. DO NOT wait until you are out of the medication to request a refill.

## 2022-05-04 NOTE — OP NOTE
KNEE IMPLANT REMOVAL WITH INSERTION OF SPACER AND ANTIBIOTIC CEMENT  Procedure Report    Patient Name:  Corrina Reis  YOB: 1965    Date of Surgery:  5/4/2022     Indications:    Patient is a 56 y.o. 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  This was about a year ago she underwent a arthroscopic I&D at outside facility and presented to me after course of antibiotics and worsening knee pain and inability to ambulate because of severe post septic arthritis of the knee she had a significant flexion contracture and a limited range of motion then used a walker to ambulate only short distances.  We did aspirate her knee and it appeared clear for infection only 147 WBCs and 41% polys and had no growth final on the aspiration her inflammatory markers were still slightly elevated at the time I saw her in the office we also performed a CT scan that showed no abscess or sequestrum in the bones around the knee she had a spinal cord stimulator so cannot perform an MRI.  But despite the relative benign aspiration with a history of an MRSA infection in the knee felt safest route would be to placed in an articulating antibiotic spacer and allow another course of IV antibiotics and then once we ensured infection cleared reimplant her with permanent knee replacement and 8 to 10 weeks.  Patient agreed with this plan and we proceeded with the irrigation debridement and placement of an articulating antibiotic spacer. Likely risk benefits of the procedure including but not limited to infection, DVT, pulmonary embolism, future loosening of the implants, possibility vengeance injury to tendons, ligaments, nerves and/or vessels and periprosthetic fracture been discussed in detail. Despite the risks involved, the patient elected to proceed with an informed consent was obtained and the patient was scheduled for surgery. The patient was seen in the preoperative holding area and the operative  extremity was marked.    Pre-op Diagnosis:   Septic arthritis of the left knee       Post-Op Diagnosis Codes:  Septic arthritis of the left knee    Procedure/CPT® Codes:      Procedure(s):  IRRIGATION AND DEBRIDMENT, LEFT KNEE ANTIBIOTIC SPACER PLACEMENT  And placement of a incisional wound VAC 4 x 20 cm  Staff:  Surgeon(s):  Rei Sutherland MD    Anesthesia: General with Block    Estimated Blood Loss: 50 mL    Implants:    Implant Name Type Inv. Item Serial No.  Lot No. LRB No. Used Action   CMT BONE SIMPLEX/P FULL DOSE 10/PK - [id number] Implant CMT BONE SIMPLEX/P FULL DOSE 10/PK  [employer]ON[employer]YST[employer] [id number] Left 4 Implanted   DEV CONTRL TISS STRATAFIX SPIRAL PDO BIDIR 1 [id number] - [id number] Implant DEV CONTRL TISS STRATAFIX SPIRAL PDO BIDIR 1 [id number]  [employer]  DIV OF J [employer] J [id number] Left 2 Implanted   COMP FEM TRIATH CR CMT NO6 LT - [id number] Implant COMP FEM TRIATH CR CMT NO6 LT  JON[employer] CRYSTAL [id number] Left 1 Implanted   INSRT TIB/KN TRIATHLON CONDY/STBL A/POLY SZ6 9MM - [id number] Implant INSRT TIB/KN TRIATHLON CONDY/STBL A/POLY SZ6 9MM  JON[employer] CRYSTAL [id number] Left 1 Implanted       Specimen:          Specimens     ID Source Type Tests Collected By Collected At Frozen?    1 Knee, Left Tissue · FUNGAL CULTURE  · TISSUE / BONE CULTURE  · AFB CULTURE  · ANAEROBIC CULTURE 10 DAY INCUBATION   Rei Sutherland MD 5/4/22 1113     Description: Synovium 1     2 Knee, Left Tissue · FUNGAL CULTURE  · TISSUE / BONE CULTURE  · AFB CULTURE  · ANAEROBIC CULTURE 10 DAY INCUBATION   Rei Sutherland MD 5/4/22 1114     Description: Fat pad     3 Knee, Left Tissue · FUNGAL CULTURE  · TISSUE / BONE CULTURE  · AFB CULTURE  · ANAEROBIC CULTURE 10 DAY INCUBATION   Rei Sutherland MD 5/4/22 1114     Description: Posterior capsule             Findings: Severe end-stage arthritic changes grade 4 KL all compartments of the knee no signs of any active purulence or sequestrum's in the bone    Complications: None    Description  of Procedure:   The patient was transferred to [facility] operating room. Preoperative antibiotics in the form of 2 g of Kefzol as well as 1 g of vancomycin due to history of MRSA infection as well as 1 g of tranexamic acid were given IV and infused prior to skin incision and to inflation of the tourniquet according to skip protocol. Prior to skin incision the patient also received a adductor canal as well as any posterior capsular block. Surgical timeout was performed with the entire operative team identifying the correct patient surgical site and planned procedure. The patient underwent light general anesthesia with LMA. A well padded tourniquet was placed to the proximal aspect of the operative thigh. The operative leg was then prepped and draped in the usual sterile fashion the tourniquet after application of Esmarch the tourniquet was inflated to 250 mg mercury.  A skin incision was made vertically oriented centering over the patella anteriorly. The skin and subcutaneous tissue were incised and a medial parapatellar approach was developed there was a significant effusion. The patella was subluxed over the knee there is complete loss of the articular cartilage on the lateral distal femoral condyle with corresponding areas loss of articular cartilage of the lateral tibial plateau.  Did an extensive synovectomy of the superior pouch medial lateral gutters and fat pad multiple cultures were taken. there were severe arthritic changes in the medial tibial compartment. There were extensive osteophytes on the patella and complete loss of articular cartilage with significant lateral wear of the patella as well as in the femoral trochlear groove. A distal femoral cut was made using utilizing an intramedullary alignment andrew we started by resecting 8 mm of distal femur with a 5° valgus cut. This is found to be satisfactory.  Attention was then turned to the proximal tibia. Extra medullary alignment  andrew was utilized and 9 mm of tibial thickness utilizing a stylist was resected in order to take off as little bone as possible. Unfortunately this recently remained to take of a resected tibia and another 2 mm of proximal tibia was needed to be resected. It was cut with a 0° posterior slope and a neutral varus mild valgus alignment. The proximal tibial cut was found to be satisfactory we did place a spacer block and between the distal femoral cut and the proximal tibia cut and found overall good alignment of the knee.  Attention was then directed at the distal femur the distal femoral size and rotation was determined by using a tensioner device as well as to help him set external rotation of the femur. This was confirmed along with verification Whitesides line and trans epicondylar axis. Once he's pinholes were made in the formal cutting block was applied to the distal femur again checked to make sure that it was a good size anteriorly and size #6 was used. Anterior followed by posterior followed by chamfer cuts were completed. resection posterior osteophytes were resected from the distal femur medial and lateral meniscectomies were then completed. The proximal tibia was incised with a trial reduction reductions found to be satisfactory range of motion from 0-125° with patella tracking well. Attention was then directed at the patella we did shave off the cartilage surface of the patella to ensure that there were no underneath sequestrum's and remove a nidus for infection.  With the trials in the bony surface were prepared for cementation we did overream with boss reamer for the proximal tibia maintaining correct rotational alignment and then used the keel punch.   Trial components were placed and taped knee taken through range of motion found to be stable good overall alignment.  Trial components were removed we open the 6/6 components we constructed intramedullary dowels with antibiotic cement and was these were  hard we then mixed a second dose of high-dose antibiotic cement and placed final implants and held them in place while the cement hardened and second dose of IV TXA was given tourniquet was dropped hemostasis was achieved       Having been satisfied with this the joint was thoroughly irrigated with 3 L of saline. We did use a Betadine wash as well.  Hemovac drain was placed  The sponge needle and instrument counts are found to be correct. The arthrotomy was closed with interrupted 0 PDS followed by a running strata fix #2. 2-0 PDS for skin followed by a staples with the knee held in flexion.  Incisional wound VAC 4 x 20 cm then placed over the incision wrapped with web roll and overwrapped with Ace wrap. The patient tolerated the procedure well and is being admitted for postoperative antibiotics according to infectious disease likely 6-week IV course. The patient will be on aspirin 81 mg twice a day starting postoperative day #1. Patient will need to be mobilized with physical therapy.  We will keep her in the knee immobilizer in extension for the first 2 weeks and then allow her to begin bending her knee  I discussed the satisfactory performance of the procedure with patient's family and discussed with them the postoperative management      Rei Sutherland MD     Date: 5/4/2022  Time: 12:31 EDT         Assistant Participation:  Surgeon(s):  Rei Sutherland MD Ben Hyatt, PA assisted with proper preoperative positioning, preoperative templating, determingin availability of proper implants, prepping and draping of patient, manipulation placement of instruments, protection of ligaments and vital soft tissue structures, assistance in maintaining hemostasis and assistance with closure of the wound. Their skills and knowledge of the steps of operation and the desired outcome of each surgical step was crucial, allowing for efficient choreography of surgical procedure, and closure of the wound which lead to reduced  surgical time, less blood loss, and less risk of complications for the patient.

## 2022-05-05 LAB
ANION GAP SERPL CALCULATED.3IONS-SCNC: 11 MMOL/L (ref 5–15)
BUN SERPL-MCNC: 13 MG/DL (ref 6–20)
BUN/CREAT SERPL: 13.5 (ref 7–25)
CALCIUM SPEC-SCNC: 8.5 MG/DL (ref 8.6–10.5)
CHLORIDE SERPL-SCNC: 106 MMOL/L (ref 98–107)
CK SERPL-CCNC: 306 U/L (ref 20–180)
CO2 SERPL-SCNC: 23 MMOL/L (ref 22–29)
CREAT SERPL-MCNC: 0.96 MG/DL (ref 0.57–1)
DEPRECATED RDW RBC AUTO: 42.5 FL (ref 37–54)
EGFRCR SERPLBLD CKD-EPI 2021: 68.7 ML/MIN/1.73
ERYTHROCYTE [DISTWIDTH] IN BLOOD BY AUTOMATED COUNT: 12.9 % (ref 12.3–15.4)
GLUCOSE SERPL-MCNC: 97 MG/DL (ref 65–99)
HCT VFR BLD AUTO: 28.5 % (ref 34–46.6)
HGB BLD-MCNC: 9.9 G/DL (ref 12–15.9)
INR PPP: 1.43 (ref 0.84–1.13)
MCH RBC QN AUTO: 31.9 PG (ref 26.6–33)
MCHC RBC AUTO-ENTMCNC: 34.7 G/DL (ref 31.5–35.7)
MCV RBC AUTO: 91.9 FL (ref 79–97)
PLATELET # BLD AUTO: 155 10*3/MM3 (ref 140–450)
PMV BLD AUTO: 11 FL (ref 6–12)
POTASSIUM SERPL-SCNC: 4.5 MMOL/L (ref 3.5–5.2)
PROTHROMBIN TIME: 17.4 SECONDS (ref 11.4–14.4)
RBC # BLD AUTO: 3.1 10*6/MM3 (ref 3.77–5.28)
SODIUM SERPL-SCNC: 140 MMOL/L (ref 136–145)
WBC NRBC COR # BLD: 10 10*3/MM3 (ref 3.4–10.8)

## 2022-05-05 PROCEDURE — 85027 COMPLETE CBC AUTOMATED: CPT | Performed by: NURSE PRACTITIONER

## 2022-05-05 PROCEDURE — 85610 PROTHROMBIN TIME: CPT

## 2022-05-05 PROCEDURE — 25010000002 DAPTOMYCIN PER 1 MG: Performed by: INTERNAL MEDICINE

## 2022-05-05 PROCEDURE — 97116 GAIT TRAINING THERAPY: CPT

## 2022-05-05 PROCEDURE — 82550 ASSAY OF CK (CPK): CPT | Performed by: INTERNAL MEDICINE

## 2022-05-05 PROCEDURE — 80048 BASIC METABOLIC PNL TOTAL CA: CPT | Performed by: ORTHOPAEDIC SURGERY

## 2022-05-05 PROCEDURE — 97530 THERAPEUTIC ACTIVITIES: CPT

## 2022-05-05 RX ORDER — WARFARIN SODIUM 5 MG/1
5 TABLET ORAL
Status: DISCONTINUED | OUTPATIENT
Start: 2022-05-05 | End: 2022-05-08

## 2022-05-05 RX ADMIN — BACLOFEN 10 MG: 10 TABLET ORAL at 20:09

## 2022-05-05 RX ADMIN — DAPTOMYCIN 500 MG: 500 INJECTION, POWDER, LYOPHILIZED, FOR SOLUTION INTRAVENOUS at 12:20

## 2022-05-05 RX ADMIN — LEVOTHYROXINE SODIUM 100 MCG: 0.1 TABLET ORAL at 04:54

## 2022-05-05 RX ADMIN — AMITRIPTYLINE HYDROCHLORIDE 50 MG: 50 TABLET, FILM COATED ORAL at 20:09

## 2022-05-05 RX ADMIN — GABAPENTIN 300 MG: 300 CAPSULE ORAL at 20:09

## 2022-05-05 RX ADMIN — TRAMADOL HYDROCHLORIDE 50 MG: 50 TABLET, COATED ORAL at 12:21

## 2022-05-05 RX ADMIN — SODIUM CHLORIDE, POTASSIUM CHLORIDE, SODIUM LACTATE AND CALCIUM CHLORIDE 100 ML/HR: 600; 310; 30; 20 INJECTION, SOLUTION INTRAVENOUS at 02:02

## 2022-05-05 RX ADMIN — OXYCODONE 5 MG: 5 TABLET ORAL at 22:33

## 2022-05-05 RX ADMIN — MELOXICAM 15 MG: 7.5 TABLET ORAL at 08:31

## 2022-05-05 RX ADMIN — ACETAMINOPHEN 1000 MG: 500 TABLET ORAL at 15:20

## 2022-05-05 RX ADMIN — ACETAMINOPHEN 1000 MG: 500 TABLET ORAL at 20:09

## 2022-05-05 RX ADMIN — GABAPENTIN 300 MG: 300 CAPSULE ORAL at 08:31

## 2022-05-05 RX ADMIN — MIDODRINE HYDROCHLORIDE 5 MG: 5 TABLET ORAL at 12:20

## 2022-05-05 RX ADMIN — BACLOFEN 10 MG: 10 TABLET ORAL at 15:20

## 2022-05-05 RX ADMIN — ACETAMINOPHEN 1000 MG: 500 TABLET ORAL at 04:54

## 2022-05-05 RX ADMIN — TRAMADOL HYDROCHLORIDE 50 MG: 50 TABLET, COATED ORAL at 20:09

## 2022-05-05 RX ADMIN — OXYCODONE 5 MG: 5 TABLET ORAL at 08:31

## 2022-05-05 RX ADMIN — SERTRALINE 100 MG: 100 TABLET, FILM COATED ORAL at 08:31

## 2022-05-05 RX ADMIN — BACLOFEN 10 MG: 10 TABLET ORAL at 08:31

## 2022-05-05 RX ADMIN — MIDODRINE HYDROCHLORIDE 5 MG: 5 TABLET ORAL at 18:23

## 2022-05-05 RX ADMIN — GABAPENTIN 300 MG: 300 CAPSULE ORAL at 15:20

## 2022-05-05 RX ADMIN — FAMOTIDINE 20 MG: 20 TABLET ORAL at 08:31

## 2022-05-05 RX ADMIN — WARFARIN SODIUM 5 MG: 5 TABLET ORAL at 18:23

## 2022-05-05 RX ADMIN — MIDODRINE HYDROCHLORIDE 5 MG: 5 TABLET ORAL at 08:31

## 2022-05-05 NOTE — THERAPY TREATMENT NOTE
Patient Name: Corrina Reis  : 1965    MRN: [id number]                              Today's Date: 2022       Admit Date: 2022    Visit Dx:     ICD-10-CM ICD-9-CM   1. Infection of left knee (HCC)  M00.9 686.9     Patient Active Problem List   Diagnosis   • Autonomic orthostatic hypotension   • Infection of left knee (HCC)   • S/P irrigation and debridement, left knee antibiotic spacer placement   • Hyperlipidemia   • Hypothyroid   • History of DVT (deep vein thrombosis)     Past Medical History:   Diagnosis Date   • Anxiety    • Borderline diabetes     daily at home checks   • Deep vein thrombosis (HCC)     left leg  and    • GERD (gastroesophageal reflux disease)    • History of migraine     botox shots for migraines   • History of pulmonary embolus (PE)    • History of transfusion     autologus blood with back surgery   • Hyperlipidemia    • Hypertension    • IBS (irritable bowel syndrome)    • MRSA infection 2021    MRSA of left knee; IV antibioitcs x 4 weeks and then 6 weeks of PO antibiotics   • PONV (postoperative nausea and vomiting)    • Pyogenic arthritis of left hip (HCC) 2021   • Thyroid nodule     nodules are monitored     Past Surgical History:   Procedure Laterality Date   • APPENDECTOMY     • BACK SURGERY      Daniel removed   • CHOLECYSTECTOMY     • COLONOSCOPY     • ENDOMETRIAL ABLATION     • HYSTERECTOMY     • SPINAL CORD STIMULATOR IMPLANT     • SPINAL FUSION     • THORACIC OUTLET SURGERY      -partial removal of sternum -rib removal   • TOTAL KNEE  PROSTHESIS REMOVAL W/ SPACER INSERTION Left 2022    Procedure: IRRIGATION AND DEBRIDMENT, LEFT KNEE ANTIBIOTIC SPACER PLACEMENT;  Surgeon: Rei Sutherland MD;  Location: [facility];  Service: Orthopedics;  Laterality: Left;      General Information     Row Name 22 0936          Physical Therapy Time and Intention    Document Type evaluation  -     Mode of Treatment individual  therapy;physical therapy  -     Row Name 05/05/22 0936          General Information    Patient Profile Reviewed yes  -     Existing Precautions/Restrictions hip;brace worn when out of bed;other (see comments)  wound vac, hemovac, femoral nerve cath; KI for 2 weeks  -     Row Name 05/05/22 0936          Cognition    Orientation Status (Cognition) oriented x 4  -[facility] Name 05/05/22 0936          Safety Issues, Functional Mobility    Safety Issues Affecting Function (Mobility) safety precaution awareness;safety precautions follow-through/compliance;sequencing abilities  -     Impairments Affecting Function (Mobility) endurance/activity tolerance;strength;pain;range of motion (ROM)  -           User Key  (r) = Recorded By, (t) = Taken By, (c) = Cosigned By    Initials Name Provider Type     Reinaldo Bridges, PT Physical Therapist               Mobility     Row Name 05/05/22 0936          Bed Mobility    Bed Mobility supine-sit;scooting/bridging  -     Scooting/Bridging [place] (Bed Mobility) verbal cues;minimum assist (75% patient effort)  -     Supine-Sit [place] (Bed Mobility) supervision;verbal cues  -     Assistive Device (Bed Mobility) head of bed elevated;bed rails  -     Comment, (Bed Mobility) VCs for sequencing to EOB, min A at chux to scoot to EOB  -[facility] Name 05/05/22 0936          Transfers    Comment, (Transfers) VCs for safe hand placement with use of RW  -[facility] Name 05/05/22 0936          Sit-Stand Transfer    Sit-Stand [place] (Transfers) verbal cues;contact guard  -     Assistive Device (Sit-Stand Transfers) walker, front-wheeled  -[facility] Name 05/05/22 0936          Gait/Stairs (Locomotion)    [place] Level (Gait) verbal cues;contact guard  -     Assistive Device (Gait) walker, front-wheeled  -     Distance in Feet (Gait) 100  -     Deviations/Abnormal Patterns (Gait) bilateral deviations;kelsey decreased;gait speed decreased;stride  length decreased  -     Bilateral Gait Deviations forward flexed posture  -     Left Sided Gait Deviations weight shift ability decreased;heel strike decreased  -     Comment, (Gait/Stairs) KI donned upon arrival for all mobility this session. Gait training focused on safe management of RW with verbal cues for upright posture, proper positioning of walker, increasing step length, and weight bearing cues. Patient ambulating with step to pattern leading with LLE and RLE step to even and good sequencing once cued.  -     Row Name 05/05/22 0936          Mobility    Extremity Weight-bearing Status left lower extremity  -     Left Lower Extremity (Weight-bearing Status) weight-bearing as tolerated (WBAT)  -           User Key  (r) = Recorded By, (t) = Taken By, (c) = Cosigned By    Initials Name Provider Type    Reinaldo Mcbride PT Physical Therapist               Obj/Interventions     Row Name 05/05/22 0938          Balance    Balance Assessment sitting static balance;sitting dynamic balance;sit to stand dynamic balance;standing static balance;standing dynamic balance  -     Static Sitting Balance supervision  -     Dynamic Sitting Balance supervision  -     Position, Sitting Balance unsupported;sitting edge of bed  -     Sit to Stand Dynamic Balance contact guard;verbal cues  -     Static Standing Balance contact guard;verbal cues  -     Dynamic Standing Balance contact guard;verbal cues  -     Position/Device Used, Standing Balance supported;walker, rolling  -     Balance Interventions sitting;standing;sit to stand;supported;static;dynamic;minimal challenge  -           User Key  (r) = Recorded By, (t) = Taken By, (c) = Cosigned By    Initials Name Provider Type    Reinaldo Mcbride, [place] Physical Therapist               Goals/Plan    No documentation.                Clinical Impression     Row Name 05/05/22 0938          Pain    Pretreatment Pain Rating 3/10  -     Posttreatment Pain  Rating 3/10  -     Pain Location - Side/Orientation Left  -     Pain Location generalized  -     Pain Location - knee  -     Pre/Posttreatment Pain Comment premedicated  -     Row Name 05/05/22 0938          Plan of Care Review    Plan of Care Reviewed With patient  -     Progress improving  -     Outcome Evaluation KI donned for all mobility. Pt ambulated 100 feet with RW and CGA. Cont d/c recs for home with assist and OPPT.  -     Row Name 05/05/22 0938          Therapy Assessment/Plan (PT)    Rehab Potential (PT) good, to achieve stated therapy goals  -     Criteria for Skilled Interventions Met (PT) yes;meets criteria;skilled treatment is necessary  -     Therapy Frequency (PT) 2 times/day  -     Row Name 05/05/22 0938          Vital Signs    O2 Delivery Pre Treatment nasal cannula  -     O2 Delivery Intra Treatment supplemental O2  -     Post SpO2 (%) 98  2L  -     O2 Delivery Post Treatment nasal cannula  -     Pre Patient Position Supine  -     Intra Patient Position Standing  -     Post Patient Position Sitting  -     Row Name 05/05/22 0938          Positioning and Restraints    Pre-Treatment Position in bed  -     Post Treatment Position chair  -     In Chair notified nsg;reclined;call light within reach;encouraged to call for assist;exit alarm on;legs elevated;compression device  -           User Key  (r) = Recorded By, (t) = Taken By, (c) = Cosigned By    Initials Name Provider Type     Reinaldo Bridges, PT Physical Therapist               Outcome Measures     Row Name 05/05/22 0955          How much help from another person do you currently need...    Turning from your back to your side while in flat bed without using bedrails? 4  -     Moving from lying on back to sitting on the side of a flat bed without bedrails? 4  -JH     Moving to and from a bed to a chair (including a wheelchair)? 3  -JH     Standing up from a chair using your arms (e.g., wheelchair,  bedside chair)? 3  -     Climbing 3-5 steps with a railing? 3  -     To walk in hospital room? 3  -     AM-PAC 6 Clicks Score (PT) 20  -     Highest level of mobility 6 --> Walked 10 steps or more  -     Row Name 05/05/22 0940          Functional Assessment    Outcome Measure Options AM-PAC 6 Clicks Basic Mobility (PT)  -           User Key  (r) = Recorded By, (t) = Taken By, (c) = Cosigned By    Initials Name Provider Type    Reinaldo Mcbride, PT Physical Therapist                             Physical Therapy Education                 Title: PT OT SLP Therapies (Done)     Topic: Physical Therapy (Done)     Point: Mobility training (Done)     Learning Progress Summary           Patient Acceptance, E,TB, VU by  at 5/5/2022 0940    Acceptance, E,D, VU by TED at 5/4/2022 1345    Comment: Educated on safe sequencing with bed mobility, ambulatory transfers, and gait. Reviewed HEP and knee precautions.                   Point: Home exercise program (Done)     Learning Progress Summary           Patient Acceptance, E,TB, VU by  at 5/5/2022 0940    Acceptance, E,D, VU by TED at 5/4/2022 1345    Comment: Educated on safe sequencing with bed mobility, ambulatory transfers, and gait. Reviewed HEP and knee precautions.                   Point: Body mechanics (Done)     Learning Progress Summary           Patient Acceptance, E,TB, VU by  at 5/5/2022 0940    Acceptance, E,D, VU by TED at 5/4/2022 1345    Comment: Educated on safe sequencing with bed mobility, ambulatory transfers, and gait. Reviewed HEP and knee precautions.                   Point: Precautions (Done)     Learning Progress Summary           Patient Acceptance, E,TB, VU by  at 5/5/2022 0940    Acceptance, E,D, VU by TED at 5/4/2022 1345    Comment: Educated on safe sequencing with bed mobility, ambulatory transfers, and gait. Reviewed HEP and knee precautions.                               User Key     Initials Effective Dates Name Provider Type  Discipline     06/16/21 -  Lev Harper, PT Physical Therapist PT     09/22/20 -  Reinaldo Bridges PT Physical Therapist PT              PT Recommendation and Plan     Plan of Care Reviewed With: patient  Progress: improving  Outcome Evaluation: KI donned for all mobility. Pt ambulated 100 feet with RW and CGA. Cont d/c recs for home with assist and OPPT.     Time Calculation:    PT Charges     Row Name 05/05/22 0941             Time Calculation    Start Time 0907  -      PT Received On 05/05/22  -      PT Goal Re-Cert Due Date 05/14/22  -              Time Calculation- PT    Total Timed Code Minutes- PT 29 minute(s)  -              Timed Charges    73942 - Gait Training Minutes  17  -      38882 - PT Therapeutic Activity Minutes 12  -              Total Minutes    Timed Charges Total Minutes 29  -       Total Minutes 29  -            User Key  (r) = Recorded By, (t) = Taken By, (c) = Cosigned By    Initials Name Provider Type     Reinaldo Bridges, PT Physical Therapist              Therapy Charges for Today     Code Description Service Date Service Provider Modifiers Qty    04974489423 HC GAIT TRAINING EA 15 MIN 5/5/2022 Reinaldo Bridges, PT GP 1    15998349613 HC PT THERAPEUTIC ACT EA 15 MIN 5/5/2022 Reinaldo Bridges, PT GP 1          PT G-Codes  Outcome Measure Options: AM-PAC 6 Clicks Basic Mobility (PT)  AM-PAC 6 Clicks Score (PT): 20    Reinaldo Bridges PT  5/5/2022

## 2022-05-05 NOTE — PLAN OF CARE
Goal Outcome Evaluation:  Plan of Care Reviewed With: patient        Progress: improving  Outcome Evaluation: KI donned for all mobility. Pt ambulated 100 feet with RW and CGA. Cont d/c recs for home with assist and OPPT.

## 2022-05-05 NOTE — PROGRESS NOTES
"IM progress note      Corrina Reis  [id number]  1965     LOS: 1 day     Attending: Rei Sutherland MD    Primary Care Provider: Abbey Merrill MD      Chief Complaint/Reason for visit:  No chief complaint on file.      Subjective   Doing fairly well.  Good pain control.  No nausea, vomiting, or shortness of breath.  Progressing well with PT.    Objective     Visit Vitals  /67   Pulse 84   Temp 97.8 °F (36.6 °C) (Oral)   Resp 16   Ht 172.7 cm (67.99\")   Wt 105 kg (231 lb 7.7 oz)   SpO2 99%   BMI 35.21 kg/m²     Temp (24hrs), Av.8 °F (36.6 °C), Min:97.5 °F (36.4 °C), Max:98.4 °F (36.9 °C)      Intake/Output:    Intake/Output Summary (Last 24 hours) at 2022 1009  Last data filed at 2022 0848  Gross per 24 hour   Intake 1980 ml   Output 1800 ml   Net 180 ml        Physical Therapy:  Plan of Care Reviewed With: patient  Progress: improving  Outcome Evaluation: KI donned for all mobility. Pt ambulated 100 feet with RW and CGA. Cont d/c recs for home with assist and OPPT.  Physical Exam:     General Appearance:    Alert, cooperative, in no acute distress   Head:    Normocephalic, without obvious abnormality, atraumatic    Lungs:     Normal effort, symmetric chest rise,  clear to      auscultation bilaterally              Heart:    Regular rhythm and normal rate, normal S1 and S2    Abdomen:     Normal bowel sounds, no masses, no organomegaly, soft        non-tender, non-distended, no guarding, no rebound                tenderness   Extremities:  Clean dry and intact dressing.  Lianna suction dressing.  Drain to Hemovac(out today), peripheral nerve block catheter.  Knee immobilizer on.  Intact flexion, dorsiflexion bilateral feet.  No clubbing, cyanosis or edema.  No deformities.    Pulses:   Pulses palpable and equal bilaterally   Skin:   No bleeding, bruising or rash          Results Review:     I reviewed the patient's new clinical results.   Results from last 7 days   Lab " Units 05/05/22  0558 05/02/22  1208   WBC 10*3/mm3 10.00 5.11   HEMOGLOBIN g/dL 9.9* 13.0   HEMATOCRIT % 28.5* 39.0   PLATELETS 10*3/mm3 155 224     Results from last 7 days   Lab Units 05/05/22  0558 05/02/22  1208   SODIUM mmol/L 140 140   POTASSIUM mmol/L 4.5 3.8   CHLORIDE mmol/L 106 103   CO2 mmol/L 23.0 27.0   BUN mg/dL 13 15   CREATININE mg/dL 0.96 1.05*   CALCIUM mg/dL 8.5* 9.3   BILIRUBIN mg/dL  --  0.6   ALK PHOS U/L  --  79   ALT (SGPT) U/L  --  21   AST (SGOT) U/L  --  26   GLUCOSE mg/dL 97 109*     I reviewed the patient's new imaging including images and reports.    All medications reviewed.   acetaminophen, 1,000 mg, Oral, Q8H  amitriptyline, 50 mg, Oral, Nightly  atorvastatin, 40 mg, Oral, Nightly  baclofen, 10 mg, Oral, TID  famotidine, 20 mg, Oral, Daily  gabapentin, 300 mg, Oral, TID  levothyroxine, 100 mcg, Oral, Q AM  meloxicam, 15 mg, Oral, Daily  midodrine, 5 mg, Oral, TID AC  sertraline, 100 mg, Oral, Daily  warfarin, 3 mg, Oral, Daily      cyclobenzaprine, 5 mg, TID PRN  diphenhydrAMINE, 25 mg, Q6H PRN   Or  diphenhydrAMINE, 25 mg, Q6H PRN  HYDROmorphone, 0.5 mg, Q2H PRN   And  naloxone, 0.1 mg, Q5 Min PRN  ketorolac, 15 mg, Q6H PRN  labetalol, 10 mg, Q4H PRN  ondansetron, 4 mg, Q6H PRN   Or  ondansetron, 4 mg, Q6H PRN  ondansetron, 4 mg, Q6H PRN  oxyCODONE, 10 mg, Q4H PRN  oxyCODONE, 5 mg, Q4H PRN  Pharmacy to dose warfarin, , Continuous PRN  sodium chloride, 500 mL, TID PRN  traMADol, 50 mg, Q8H PRN        Assessment/Plan       S/P irrigation and debridement, left knee antibiotic spacer placement    Autonomic orthostatic hypotension    Infection of left knee (HCC)    Hyperlipidemia    Hypothyroid    History of DVT (deep vein thrombosis)    Acute blood loss anemia, mild, asymptomatic         Plan   1. PT/OT- Knee immobilizer for first 2 weeks no knee flexion for the first 2 weeks  2. Pain control-prns , PNB cath, ropivacaine.    3. IS-encourage  4. DVT proph- Mechs/coumadin  5. Bowel  regimen  6. Resume home medications as appropriate  7. Monitor post-op labs  8. DC planning for home     Consult [facility] for abx management. Follow op cultures, consult is pending.     Hypotension, Hyperlipidemia  - Continue home statin, and midodrine   -Hold HCTZ for now  - Monitor BP   - Holding parameters for BP meds  - Labetalol PRN for SBP>170     Hypothyroid  -Continue home Synthroid       Zoila Benites MD  05/05/22  10:09 EDT

## 2022-05-05 NOTE — THERAPY TREATMENT NOTE
Patient Name: Corrina Reis  : 1965    MRN: [id number]                              Today's Date: 2022       Admit Date: 2022    Visit Dx:     ICD-10-CM ICD-9-CM   1. Infection of left knee (HCC)  M00.9 686.9     Patient Active Problem List   Diagnosis   • Autonomic orthostatic hypotension   • Infection of left knee (HCC)   • S/P irrigation and debridement, left knee antibiotic spacer placement   • Hyperlipidemia   • Hypothyroid   • History of DVT (deep vein thrombosis)     Past Medical History:   Diagnosis Date   • Anxiety    • Borderline diabetes     daily at home checks   • Deep vein thrombosis (HCC)     left leg  and    • GERD (gastroesophageal reflux disease)    • History of migraine     botox shots for migraines   • History of pulmonary embolus (PE)    • History of transfusion     autologus blood with back surgery   • Hyperlipidemia    • Hypertension    • IBS (irritable bowel syndrome)    • MRSA infection 2021    MRSA of left knee; IV antibioitcs x 4 weeks and then 6 weeks of PO antibiotics   • PONV (postoperative nausea and vomiting)    • Pyogenic arthritis of left hip (HCC) 2021   • Thyroid nodule     nodules are monitored     Past Surgical History:   Procedure Laterality Date   • APPENDECTOMY     • BACK SURGERY      Daniel removed   • CHOLECYSTECTOMY     • COLONOSCOPY     • ENDOMETRIAL ABLATION     • HYSTERECTOMY     • SPINAL CORD STIMULATOR IMPLANT     • SPINAL FUSION     • THORACIC OUTLET SURGERY      -partial removal of sternum -rib removal   • TOTAL KNEE  PROSTHESIS REMOVAL W/ SPACER INSERTION Left 2022    Procedure: IRRIGATION AND DEBRIDMENT, LEFT KNEE ANTIBIOTIC SPACER PLACEMENT;  Surgeon: Rei Sutherland MD;  Location: [facility];  Service: Orthopedics;  Laterality: Left;      General Information     Row Name 22 1520 22 0936       Physical Therapy Time and Intention    Document Type therapy note (daily note)  -JORDON  evaluation  -    Mode of Treatment individual therapy;physical therapy  - individual therapy;physical therapy  -    Row Name 05/05/22 1520 05/05/22 0936       General Information    Patient Profile Reviewed yes  - yes  -    Existing Precautions/Restrictions hip;brace worn when out of bed;other (see comments)  hemovac, femoral nerve cath; KI for 2 weeks  - hip;brace worn when out of bed;other (see comments)  wound vac, hemovac, femoral nerve cath; KI for 2 weeks  -    Row Name 05/05/22 1520 05/05/22 0936       Cognition    Orientation Status (Cognition) oriented x 4  - oriented x 4  -    Row Name 05/05/22 1520 05/05/22 0936       Safety Issues, Functional Mobility    Safety Issues Affecting Function (Mobility) safety precaution awareness;safety precautions follow-through/compliance;sequencing abilities  - safety precaution awareness;safety precautions follow-through/compliance;sequencing abilities  -    Impairments Affecting Function (Mobility) endurance/activity tolerance;strength;pain;range of motion (ROM)  - endurance/activity tolerance;strength;pain;range of motion (ROM)  -          User Key  (r) = Recorded By, (t) = Taken By, (c) = Cosigned By    Initials Name Provider Type     Reinaldo Bridges PT Physical Therapist               Mobility     Row Name 05/05/22 1522 05/05/22 0936       Bed Mobility    Bed Mobility sit-supine;scooting/bridging  - supine-sit;scooting/bridging  -    Scooting/Bridging [place] (Bed Mobility) supervision;verbal cues  - verbal cues;minimum assist (75% patient effort)  -    Supine-Sit Indian (Bed Mobility) -- supervision;verbal cues  -    Sit-Supine Indian (Bed Mobility) verbal cues;minimum assist (75% patient effort)  - --    Assistive Device (Bed Mobility) head of bed elevated;bed rails  - head of bed elevated;bed rails  -    Comment, (Bed Mobility) VCs for sequencing and min A at LE to get onto bed  - VCs for sequencing to  EOB, min A at chux to scoot to EOB  -    Row Name 05/05/22 1522 05/05/22 0936       Transfers    Comment, (Transfers) VCs for safe hand placement  - VCs for safe hand placement with use of RW  -    Row Name 05/05/22 1522 05/05/22 0936       Sit-Stand Transfer    Sit-Stand [place] (Transfers) verbal cues;contact guard  - verbal cues;contact guard  -    Assistive Device (Sit-Stand Transfers) walker, front-wheeled  - walker, front-wheeled  -    Row Name 05/05/22 1522 05/05/22 0936       Gait/Stairs (Locomotion)    [place] Level (Gait) verbal cues;contact guard  - verbal cues;contact guard  -    Assistive Device (Gait) walker, front-wheeled  - walker, front-wheeled  -    Distance in Feet (Gait) 200  - 100  -    Deviations/Abnormal Patterns (Gait) bilateral deviations;kelsey decreased;gait speed decreased;stride length decreased  - bilateral deviations;kelsey decreased;gait speed decreased;stride length decreased  -    Bilateral Gait Deviations forward flexed posture  - forward flexed posture  -    Left Sided Gait Deviations weight shift ability decreased;heel strike decreased  - weight shift ability decreased;heel strike decreased  -    Comment, (Gait/Stairs) Gait training focused on safe management of RW with verbal cues for upright posture, proper positioning of walker, increasing step length, and weight bearing cues. Patient ambulating with step through once cued and showing improved sequencing.  - KI donned upon arrival for all mobility this session. Gait training focused on safe management of RW with verbal cues for upright posture, proper positioning of walker, increasing step length, and weight bearing cues. Patient ambulating with step to pattern leading with LLE and RLE step to even and good sequencing once cued.  -    Row Name 05/05/22 1522 05/05/22 0936       Mobility    Extremity Weight-bearing Status left lower extremity  - left lower extremity  -     Left Lower Extremity (Weight-bearing Status) weight-bearing as tolerated (WBAT)  - weight-bearing as tolerated (WBAT)  -          User Key  (r) = Recorded By, (t) = Taken By, (c) = Cosigned By    Initials Name Provider Type    Reinaldo Mcbride, [place] Physical Therapist               Obj/Interventions     Row Name 05/05/22 1525          Motor Skills    Therapeutic Exercise other (see comments)  ankle pumps x 20, glute sets x 20  -     Row Name 05/05/22 1525 05/05/22 0938       Balance    Balance Assessment sitting static balance;sitting dynamic balance;sit to stand dynamic balance;standing static balance;standing dynamic balance  - sitting static balance;sitting dynamic balance;sit to stand dynamic balance;standing static balance;standing dynamic balance  -    Static Sitting Balance independent  - supervision  -    Dynamic Sitting Balance independent  - supervision  -    Position, Sitting Balance unsupported;sitting edge of bed  - unsupported;sitting edge of bed  -    Sit to Stand Dynamic Balance -- contact guard;verbal cues  -    Static Standing Balance contact guard;verbal cues  - contact guard;verbal cues  -    Dynamic Standing Balance contact guard;verbal cues  - contact guard;verbal cues  -    Position/Device Used, Standing Balance supported;walker, rolling  - supported;walker, rolling  -    Balance Interventions sitting;standing;sit to stand;supported;static;dynamic;minimal challenge  - sitting;standing;sit to stand;supported;static;dynamic;minimal challenge  -          User Key  (r) = Recorded By, (t) = Taken By, (c) = Cosigned By    Initials Name Provider Type    Reinaldo Mcbride, [place] Physical Therapist               Goals/Plan    No documentation.                Clinical Impression     Row Name 05/05/22 1525 05/05/22 0938       Pain    Pretreatment Pain Rating 0/10 - no pain  - 3/10  -    Posttreatment Pain Rating 0/10 - no pain  - 3/10  -    Pain Location -  Side/Orientation -- Left  -    Pain Location -- generalized  -    Pain Location - -- knee  -    Pre/Posttreatment Pain Comment -- premedicated  -    Row Name 05/05/22 1525 05/05/22 0938       Plan of Care Review    Plan of Care Reviewed With patient  - patient  -    Progress improving  - improving  -    Outcome Evaluation KI donned for all mobility. Pt improved forward ambulation distance to 100 feet with RW and CGA. Cont d/c recs for home with assist and OPPT when medically ready.  - KI donned for all mobility. Pt ambulated 100 feet with RW and CGA. Cont d/c recs for home with assist and OPPT.  -    Row Name 05/05/22 1525 05/05/22 0938       Therapy Assessment/Plan (PT)    Rehab Potential (PT) good, to achieve stated therapy goals  - good, to achieve stated therapy goals  -    Criteria for Skilled Interventions Met (PT) yes;meets criteria;skilled treatment is necessary  - yes;meets criteria;skilled treatment is necessary  -    Therapy Frequency (PT) 2 times/day  - 2 times/day  -    Row Name 05/05/22 1525 05/05/22 0938       Vital Signs    O2 Delivery Pre Treatment -- nasal cannula  -    O2 Delivery Intra Treatment -- supplemental O2  -    Post SpO2 (%) -- 98  2L  -    O2 Delivery Post Treatment -- nasal cannula  -    Pre Patient Position Sitting  - Supine  -    Intra Patient Position Standing  - Standing  -    Post Patient Position Supine  - Sitting  -[facility] Name 05/05/22 1525 05/05/22 0938       Positioning and Restraints    Pre-Treatment Position sitting in chair/recliner  - in bed  -    Post Treatment Position bed  - chair  -    In Bed notified nsg;supine;call light within reach;encouraged to call for assist;exit alarm on;SCD pump applied  - --    In Chair -- notified nsg;reclined;call light within reach;encouraged to call for assist;exit alarm on;legs elevated;compression device  -          User Key  (r) = Recorded By, (t) = Taken By, (c) =  Cosigned By    Initials Name Provider Type    Reinaldo Mcbride, [place] Physical Therapist               Outcome Measures     Row Name 05/05/22 1526 05/05/22 0940       How much help from another person do you currently need...    Turning from your back to your side while in flat bed without using bedrails? -- 4  -JH    Moving from lying on back to sitting on the side of a flat bed without bedrails? 3  - 4  -JH    Moving to and from a bed to a chair (including a wheelchair)? 3  - 3  -JH    Standing up from a chair using your arms (e.g., wheelchair, bedside chair)? 3  - 3  -JH    Climbing 3-5 steps with a railing? 3  - 3  -JH    To walk in hospital room? 3  - 3  -    AM-PAC 6 Clicks Score (PT) -- 20  -    Highest level of mobility -- 6 --> Walked 10 steps or more  -    Row Name 05/05/22 0830          How much help from another person do you currently need...    Turning from your back to your side while in flat bed without using bedrails? 3  -KL     Moving from lying on back to sitting on the side of a flat bed without bedrails? 3  -KL     Moving to and from a bed to a chair (including a wheelchair)? 3  -KL     Standing up from a chair using your arms (e.g., wheelchair, bedside chair)? 3  -KL     Climbing 3-5 steps with a railing? 3  -KL     To walk in hospital room? 3  -     AM-PAC 6 Clicks Score (PT) 18  -     Highest level of mobility 6 --> Walked 10 steps or more  -     Row Name 05/05/22 1526 05/05/22 0940       Functional Assessment    Outcome Measure Options AM-PAC 6 Clicks Basic Mobility (PT)  - AM-PAC 6 Clicks Basic Mobility (PT)  -          User Key  (r) = Recorded By, (t) = Taken By, (c) = Cosigned By    Initials Name Provider Type    Sumi Valencia, RN Registered Nurse    Reinaldo Mcbride PT Physical Therapist                             Physical Therapy Education                 Title: PT OT SLP Therapies (Done)     Topic: Physical Therapy (Done)     Point: Mobility training  (Done)     Learning Progress Summary           Patient Acceptance, E,TB, VU by  at 5/5/2022 1526    Acceptance, E,TB, VU by  at 5/5/2022 0940    Acceptance, E,D, VU by  at 5/4/2022 1345    Comment: Educated on safe sequencing with bed mobility, ambulatory transfers, and gait. Reviewed HEP and knee precautions.                   Point: Home exercise program (Done)     Learning Progress Summary           Patient Acceptance, E,TB, VU by  at 5/5/2022 1526    Acceptance, E,TB, VU by  at 5/5/2022 0940    Acceptance, E,D, VU by TED at 5/4/2022 1345    Comment: Educated on safe sequencing with bed mobility, ambulatory transfers, and gait. Reviewed HEP and knee precautions.                   Point: Body mechanics (Done)     Learning Progress Summary           Patient Acceptance, E,TB, VU by  at 5/5/2022 1526    Acceptance, E,TB, VU by  at 5/5/2022 0940    Acceptance, E,D, VU by  at 5/4/2022 1345    Comment: Educated on safe sequencing with bed mobility, ambulatory transfers, and gait. Reviewed HEP and knee precautions.                   Point: Precautions (Done)     Learning Progress Summary           Patient Acceptance, E,TB, VU by  at 5/5/2022 1526    Acceptance, E,TB, VU by  at 5/5/2022 0940    Acceptance, E,D, VU by  at 5/4/2022 1345    Comment: Educated on safe sequencing with bed mobility, ambulatory transfers, and gait. Reviewed HEP and knee precautions.                               User Key     Initials Effective Dates Name Provider Type Discipline     06/16/21 -  Lev Harper, PT Physical Therapist PT     09/22/20 -  Reinaldo Bridges, [place] Physical Therapist PT              PT Recommendation and Plan     Plan of Care Reviewed With: patient  Progress: improving  Outcome Evaluation: KI donned for all mobility. Pt improved forward ambulation distance to 100 feet with RW and CGA. Cont d/c recs for home with assist and OPPT when medically ready.     Time Calculation:    PT Charges     Row Name  05/05/22 1527 05/05/22 0941          Time Calculation    Start Time 1440  - 0907  -     PT Received On 05/05/22  - 05/05/22  -     PT Goal Re-Cert Due Date 05/14/22  - 05/14/22  -            Time Calculation- PT    Total Timed Code Minutes- PT 24 minute(s)  - 29 minute(s)  -            Timed Charges    32089 - PT Therapeutic Exercise Minutes 4  - --     33957 - Gait Training Minutes  15  - 17  -     00749 - PT Therapeutic Activity Minutes 5  - 12  -            Total Minutes    Timed Charges Total Minutes 24  - 29  -      Total Minutes 24  - 29  -           User Key  (r) = Recorded By, (t) = Taken By, (c) = Cosigned By    Initials Name Provider Type    Reinaldo Mcbride, PT Physical Therapist              Therapy Charges for Today     Code Description Service Date Service Provider Modifiers Qty    17107486751 HC GAIT TRAINING EA 15 MIN 5/5/2022 Reinaldo Bridges, PT GP 1    56290071395 HC PT THERAPEUTIC ACT EA 15 MIN 5/5/2022 Reinaldo Bridges, PT GP 1    99374592466 HC GAIT TRAINING EA 15 MIN 5/5/2022 Reinaldo Bridges, PT GP 1    40455189667 HC PT THERAPEUTIC ACT EA 15 MIN 5/5/2022 Reinaldo Bridges, PT GP 1          PT G-Codes  Outcome Measure Options: AM-PAC 6 Clicks Basic Mobility (PT)  AM-PAC 6 Clicks Score (PT): 20    Reinaldo Bridges PT  5/5/2022

## 2022-05-05 NOTE — CONSULTS
INFECTIOUS DISEASE CONSULT/INITIAL HOSPITAL VISIT    Corrina Reis  1965  [id number]    Date of consult: 5/5/2022    Admit date: 5/4/2022    Requesting Provider: Rei Sutherland MD  Evaluating physician: Reji Ascencio MD  Reason for Consultation: MRSA septic left knee  Chief Complaint: Above      Subjective   History of present illness:  Patient is a very pleasant  56 y.o.  Yr old female with a history of borderline diabetes mellitus, anxiety, DVT in the past, GERD, spinal cord stimulator for chronic pain (2012), who fell in April 2021 with left knee injury and subsequent MRSA infection treated with arthroscopic washout in April 2021.  Cultures reported positive for MRSA in the knee and in blood and she was treated with about 6 weeks of IV vancomycin (until around 6/10/2021 per Dr. Evaristo Figueroa, infectious disease at [facility]), followed by 6 weeks of doxycycline.  The patient had progressive left knee pain with inability to ambulate and was evaluated by Dr. Rei Bee who aspirated left knee with reported fluid showing 147 WBCs, 41% PMNs.  CT scan without evidence of abscess or sequestrum.  The patient was taken to the operating room after admission to [facility] on 5/4/2022, where she was found to have extensive injuries including minimal cartilage, extensive osteophytes.  Antibiotic spacer was placed.  I was consulted on 5/5/2022 for further evaluation and treatment.  No other localizing signs or symptoms of infection.    Past Medical History:   Diagnosis Date   • Anxiety    • Borderline diabetes     daily at home checks   • Deep vein thrombosis (HCC)     left leg 2001 and 2014   • GERD (gastroesophageal reflux disease)    • History of migraine     botox shots for migraines   • History of pulmonary embolus (PE) 2014   • History of transfusion     autologus blood with back surgery   • Hyperlipidemia    • Hypertension    • IBS (irritable bowel syndrome)    •  MRSA infection 04/2021    MRSA of left knee; IV antibioitcs x 4 weeks and then 6 weeks of PO antibiotics   • PONV (postoperative nausea and vomiting)    • Pyogenic arthritis of left hip (HCC) 04/2021   • Thyroid nodule     nodules are monitored       Past Surgical History:   Procedure Laterality Date   • APPENDECTOMY  1978   • BACK SURGERY      Daniel removed   • CHOLECYSTECTOMY  1997   • COLONOSCOPY     • ENDOMETRIAL ABLATION     • HYSTERECTOMY  1998   • SPINAL CORD STIMULATOR IMPLANT  2013   • SPINAL FUSION  1978   • THORACIC OUTLET SURGERY      1994-partial removal of sternum 1995-rib removal   • TOTAL KNEE  PROSTHESIS REMOVAL W/ SPACER INSERTION Left 5/4/2022    Procedure: IRRIGATION AND DEBRIDMENT, LEFT KNEE ANTIBIOTIC SPACER PLACEMENT;  Surgeon: Rei Sutherland MD;  Location: [facility];  Service: Orthopedics;  Laterality: Left;       Pediatric History   Patient Parents   • Not on file     Other Topics Concern   • Not on file   Social History Narrative   • Not on file       family history includes Atrial fibrillation in her mother; Autoimmune disease in her sister; Diabetes in her brother, mother, and sister; Graves' disease in her brother and sister; Heart attack in her brother; Heart disease in her mother; Hypertension in her brother, brother, and sister; Lung cancer in her brother; No Known Problems in her father and sister; Rectal cancer in her brother.    Allergies   Allergen Reactions   • Msg [Monosodium Glutamate] Nausea And Vomiting and Headache     Numbness around mouth and eyes   • Orange Nausea And Vomiting and Headache     Numbness around mouth and eyes   • Penicillins Hives   • Sulfa Antibiotics Hives       Immunization History   Administered Date(s) Administered   • COVID-19 (MODERNA) 1st, 2nd, 3rd Dose Only 03/19/2021, 05/12/2021       Medication:    Current Facility-Administered Medications:   •  acetaminophen (TYLENOL) tablet 1,000 mg, 1,000 mg, Oral, Q8H, Rei Sutherland MD, 1,000 mg at  05/05/22 0454  •  amitriptyline (ELAVIL) tablet 50 mg, 50 mg, Oral, Nightly, Skidmore, Gillian, APRN, 50 mg at 05/04/22 2055  •  atorvastatin (LIPITOR) tablet 40 mg, 40 mg, Oral, Nightly, Skidmore, Gillian, APRN, 40 mg at 05/04/22 2055  •  baclofen (LIORESAL) tablet 10 mg, 10 mg, Oral, TID, Hurtado, Gillian, APRN, 10 mg at 05/05/22 0831  •  cyclobenzaprine (FLEXERIL) tablet 5 mg, 5 mg, Oral, TID PRN, Hurtado, Gillian, APRN  •  diphenhydrAMINE (BENADRYL) capsule 25 mg, 25 mg, Oral, Q6H PRN **OR** diphenhydrAMINE (BENADRYL) injection 25 mg, 25 mg, Intravenous, Q6H PRN, Rei Sutherland MD  •  famotidine (PEPCID) tablet 20 mg, 20 mg, Oral, Daily, Hurtado, Gillian, APRN, 20 mg at 05/05/22 0831  •  gabapentin (NEURONTIN) capsule 300 mg, 300 mg, Oral, TID, Zoila Benites MD, 300 mg at 05/05/22 0831  •  HYDROmorphone (DILAUDID) injection 0.5 mg, 0.5 mg, Intravenous, Q2H PRN **AND** naloxone (NARCAN) injection 0.1 mg, 0.1 mg, Intravenous, Q5 Min PRN, Rei Sutherland MD  •  ketorolac (TORADOL) injection 15 mg, 15 mg, Intravenous, Q6H PRN, Rei Sutherland MD  •  labetalol (NORMODYNE,TRANDATE) injection 10 mg, 10 mg, Intravenous, Q4H PRN, Bryant, Gillian, APRN  •  lactated ringers infusion, 9 mL/hr, Intravenous, Continuous, Rei Sutherland MD, Stopped at 05/04/22 1306  •  lactated ringers infusion, 100 mL/hr, Intravenous, Continuous, Rei Sutherland MD, Last Rate: 100 mL/hr at 05/05/22 0202, 100 mL/hr at 05/05/22 0202  •  levothyroxine (SYNTHROID, LEVOTHROID) tablet 100 mcg, 100 mcg, Oral, Q AM, Gillian Hurtado APRN, 100 mcg at 05/05/22 0454  •  meloxicam (MOBIC) tablet 15 mg, 15 mg, Oral, Daily, Rei Sutherland MD, 15 mg at 05/05/22 0831  •  midodrine (PROAMATINE) tablet 5 mg, 5 mg, Oral, TID AC, Gillian Hurtado APRN, 5 mg at 05/05/22 0831  •  ondansetron (ZOFRAN) tablet 4 mg, 4 mg, Oral, Q6H PRN **OR** ondansetron (ZOFRAN) injection 4 mg, 4 mg, Intravenous, Q6H PRN, Rei Sutherland MD  •  ondansetron (ZOFRAN)  injection 4 mg, 4 mg, Intravenous, Q6H PRN, Gillian Hurtado, SACHIN  •  oxyCODONE (ROXICODONE) immediate release tablet 10 mg, 10 mg, Oral, Q4H PRN, Rei Sutherland MD  •  oxyCODONE (ROXICODONE) immediate release tablet 5 mg, 5 mg, Oral, Q4H PRN, Rei Sutherland MD, 5 mg at 05/05/22 0831  •  Pharmacy to dose warfarin, , Does not apply, Continuous PRN, Rei Sutherland MD  •  ropivacaine (Naropin) 0.2% in NS infusion (ARROW Pump), 10 mL/hr, Peripheral Nerve, Continuous, Rei Sutherland MD, Last Rate: 10 mL/hr at 05/04/22 1308, 10 mL/hr at 05/04/22 1308  •  sertraline (ZOLOFT) tablet 100 mg, 100 mg, Oral, Daily, Gillian Hurtado APRN, 100 mg at 05/05/22 0831  •  sodium chloride 0.9 % bolus 500 mL, 500 mL, Intravenous, TID PRN, Gillian Hurtado APRN  •  traMADol (ULTRAM) tablet 50 mg, 50 mg, Oral, Q8H PRN, Rei Sutherland MD, 50 mg at 05/04/22 1758  •  warfarin (COUMADIN) tablet 3 mg, 3 mg, Oral, Daily, Philomena Castillo, PharmD, 3 mg at 05/04/22 1755    Please refer to the medical record for a full medication list    Review of Systems:    Constitutional-- No Fever, chills or sweats.  Appetite good, and no malaise. No fatigue.  HEENT-- No new vision, hearing or throat complaints.  No epistaxis or oral sores.  Denies odynophagia or dysphagia.  No odynophagia or dysphagia. No headache, photophobia or neck stiffness.  CV-- No chest pain, palpitation or syncope  Resp-- No SOB/cough/Hemoptysis  GI- No nausea, vomiting, or diarrhea.  No hematochezia, melena, or hematemesis. Denies jaundice or chronic liver disease.  -- No dysuria, hematuria, or flank pain.  Denies hesitancy, urgency or flank pain.  Lymph- no swollen lymph nodes in neck/axilla or groin.   Heme- No active bruising or bleeding; no Hx of DVT or PE.  MS-- no swelling or pain in the bones or joints of arms/legs.  No new back pain.  Except left knee pain.  Neuro-- No acute focal weakness or numbness in the arms or legs.  No seizures.  Skin--No rashes or  "lesions    Physical Exam:   Vital Signs   Temp:  [97.5 °F (36.4 °C)-98.4 °F (36.9 °C)] 97.8 °F (36.6 °C)  Heart Rate:  [64-87] 84  Resp:  [16] 16  BP: ()/(33-92) 113/67    Temp  Min: 97.5 °F (36.4 °C)  Max: 98.4 °F (36.9 °C)  BP  Min: 97/88  Max: 129/82  Pulse  Min: 64  Max: 87  Resp  Min: 16  Max: 16  SpO2  Min: 94 %  Max: 100 %    Blood pressure 113/67, pulse 84, temperature 97.8 °F (36.6 °C), temperature source Oral, resp. rate 16, height 172.7 cm (67.99\"), weight 105 kg (231 lb 7.7 oz), SpO2 99 %.  GENERAL: Awake and alert, in moderate distress. Appears older than stated age.  HEENT:  Normocephalic, atraumatic.  Oropharynx without thrush. Dentition in good repair. No cervical adenopathy. No neck masses.  Ears externally normal, Nose externally normal.  EYES: PERRL. No conjunctival injection. No icterus. EOM full.  LYMPHATICS: No lymphadenopathy of the neck or axillary or inguinal regions.   HEART: No murmur, gallop, or pericardial friction rub. Reg rate rhythm, No JVD at 45 degrees.  LUNGS: Clear to auscultation and percussion. No respiratory distress, no use of accessory muscles.  ABDOMEN: Soft, nontender, nondistended. No appreciable HSM.  Bowel sounds normal.  GENITAL: No external lesions, breasts without masses, back straight, no CVAT, rectal external without lesions.   SKIN: Warm and dry without cutaneous eruptions.  No nodules.  Left knee surgical dressing in place.  PSYCHIATRIC: Mental status lucid. No confusion.  EXT:  No cellulitic change.  Decreased range of motion left knee.  NEURO: Oriented to name, CN 2 to 12 intact, DTR 1 + and symmetric, sensory intact to LT upper and lower extremitiy, motor 5/5 upper and lower extremity, cerebellar and gait not tested.      Results Review:   I reviewed the patient's new clinical results.  I reviewed the patient's new imaging results and agree with the interpretation.  I reviewed the patient's other test results and agree with the interpretation    Results " from last 7 days   Lab Units 05/05/22  0558 05/02/22  1208   WBC 10*3/mm3 10.00 5.11   HEMOGLOBIN g/dL 9.9* 13.0   HEMATOCRIT % 28.5* 39.0   PLATELETS 10*3/mm3 155 224     Results from last 7 days   Lab Units 05/05/22  0558   SODIUM mmol/L 140   POTASSIUM mmol/L 4.5   CHLORIDE mmol/L 106   CO2 mmol/L 23.0   BUN mg/dL 13   CREATININE mg/dL 0.96   GLUCOSE mg/dL 97   CALCIUM mg/dL 8.5*     Results from last 7 days   Lab Units 05/02/22  1208   ALK PHOS U/L 79   BILIRUBIN mg/dL 0.6   ALT (SGPT) U/L 21   AST (SGOT) U/L 26     Results from last 7 days   Lab Units 05/02/22  1208   SED RATE mm/hr 16     Results from last 7 days   Lab Units 05/02/22  1208   CRP mg/dL 0.83*             Estimated Creatinine Clearance: 81 mL/min (by C-G formula based on SCr of 0.96 mg/dL).    Microbiology:  Microbiology Results Abnormal     Procedure Component Value - Date/Time    Tissue / Bone Culture - Tissue, Knee, Left [[id number]] Collected: 05/04/22 1114    Lab Status: Preliminary result Specimen: Tissue from Knee, Left Updated: 05/05/22 0855     Tissue Culture No growth     Gram Stain No WBCs or organisms seen    Tissue / Bone Culture - Tissue, Knee, Left [[id number]] Collected: 05/04/22 1114    Lab Status: Preliminary result Specimen: Tissue from Knee, Left Updated: 05/05/22 0855     Tissue Culture No growth     Gram Stain Rare (1+) WBCs seen      No organisms seen    Tissue / Bone Culture - Tissue, Knee, Left [[id number]] Collected: 05/04/22 1113    Lab Status: Preliminary result Specimen: Tissue from Knee, Left Updated: 05/05/22 0853     Tissue Culture No growth     Gram Stain No WBCs or organisms seen          Radiology:  Imaging Results (Last 72 Hours)     Procedure Component Value Units Date/Time    XR Knee 1 or 2 View Left [[id number]] Collected: 05/04/22 1416     Updated: 05/04/22 1424    Narrative:      EXAMINATION: XR KNEE 1 OR 2 VW LEFT-     DATE OF EXAM: 5/4/2022 1:57 PM     INDICATION: Post-Op Knee Arthoplasty; M00.9-Pyogenic  arthritis,  unspecified.     COMPARISON: None available     TECHNIQUE: One or Two views of the left knee were obtained.     FINDINGS:  There is a radiolucent spacer at the tibial plateau. The distal femoral  component appears in expected alignment. There are intramedullary rods  with surrounding cement present within the distal femur and proximal  tibia. There are cutaneous staples present at the midline.       Impression:      1. Expected postoperative changes of the left knee.     This report was finalized on 5/4/2022 2:20 PM by Chivo Sommer MD.             IMPRESSION:     1.  Left knee MRSA infection, previous I&D April 2021 and treated, along with treated MRSA sepsis.  Data deficit.  Extensive injury to left knee resulting in the need for eventual prosthetic knee replacement.  Debrided on 5/4/2022 with antibiotic spacer placed.  2.  Anemia, related to acute postoperative blood loss and chronic disease.  3.  Hypocalcemia.  4.  Prediabetes.  5.  History of GERD, DVT, chronic pain with spinal cord stimulator.  6.  Previous history of MRSA sepsis and left knee septic arthritis April 2021.    RECOMMENDATIONS:    1.  Diagnostically, continue to follow patient's physical exam, CBC, CMP, CRP, CPK, cultures obtained from surgery on 5/4/2022.  2.  Therapeutically, consider treatment with daptomycin 6 mg/kg IV daily for 6 weeks until approximately 6/16/2022 and reassess.  Hold atorvastatin while on daptomycin.  We will arrange for this as outpatient therapy.  3.  Supportive care.    I discussed the patient's findings and my recommendations with patient and nursing staff    Thank you for asking me to see Corrina Reis.  Our group would be pleased to follow this patient over the course of their hospitalization and assist with outpatient antimicrobial therapy, as indicated.  Further recommendations depend on the results of the cultures and clinical course.  Side effects of antibiotics discussed.    Case management  orders: Please arrange for home antibiotics with [facility] with daptomycin 500 mg IV daily until 6/16/2022.  Check CBC, CMP, CRP, CPK weekly while on IV antibiotics.  Fax orders to [phone], call [phone] with final arrangements.  Arrange for follow-up with me by telehealth in 1 week, and in person in 2 weeks.    Reji Ascencio MD  5/5/2022

## 2022-05-05 NOTE — PLAN OF CARE
"Goal Outcome Evaluation:      Patient calm, cooperative and pleasant; A&Ox4, able to make her needs known clearly. Using IS at bedside without being directed by staff, wearing SCDs and doing dorsiflexion/ plantarflexion exercises ad devang. Expressed no pain or need of PRN pain meds so far this shift. Ambulates to/ from bathroom with CGAx1. Indicates comprehension of covered education topics, stating that she has \"been through this before\"; expresses readiness to discharge, with no stressors voiced when asked. No other issues so far.     Progress: Ongoing           "

## 2022-05-05 NOTE — PROGRESS NOTES
"Pharmacy Consult  -  Warfarin    Corrina Reis is a  56 y.o. female   Height - 172.7 cm (67.99\")  Weight - 105 kg (231 lb 7.7 oz)    Consulting Provider: Ortho  Indication: History of DVT/PE  Goal INR: 2.5-3.5  Home Regimen: 3 mg daily     Bridge Therapy: No    Drug-Drug Interactions with current regimen:     Meloxicam - may increase risk of bleeding      Tramadol - may increase risk of bleeding    Warfarin Dosing During Admission:    Date  5/4 5/5          INR  1.35 1.43          Dose  3 mg (5 mg)             Education Provided:  Warfarin education provided on 5/5 verbally and in writing.  Discussed effects of warfarin, importance of checking INR, drug-drug and drug-food interactions, and signs/symptoms of bleeding and clotting.  Patient verbalized understanding through teach back. Patient notes that she monitors her INR at home every 2 weeks with a goal INR range of 2.5-3.5. All pertinent questions were answered.      Discharge Follow up:   Following Provider - Dr. Abbey Merrill (PCP)   Follow up time range or appointment - 2-3 days after discharge    Labs:    Results from last 7 days   Lab Units 05/05/22  0558 05/04/22  0855 05/02/22  1208   INR  1.43* 1.35* 1.52*   APTT seconds  --   --  33.8*   HEMOGLOBIN g/dL 9.9*  --  13.0   HEMATOCRIT % 28.5*  --  39.0     Results from last 7 days   Lab Units 05/05/22  0558 05/02/22  1208   SODIUM mmol/L 140 140   POTASSIUM mmol/L 4.5 3.8   CHLORIDE mmol/L 106 103   CO2 mmol/L 23.0 27.0   BUN mg/dL 13 15   CREATININE mg/dL 0.96 1.05*   CALCIUM mg/dL 8.5* 9.3   BILIRUBIN mg/dL  --  0.6   ALK PHOS U/L  --  79   ALT (SGPT) U/L  --  21   AST (SGOT) U/L  --  26   GLUCOSE mg/dL 97 109*     Current dietary intake: 50-75% of meals documented in last 24 hours    Diet Order   Procedures    Diet Regular     Assessment/Plan:     Patient's INR is subtherapeutic at 1.43 today (increased from 1.35 yesterday).   Goal INR 2.5-3.5. Patient receiving warfarin for hx of 2 DVTs and a " PE.  Give a boosted dose of warfarin 5 mg today.   Daily PT/INR ordered.  Monitor signs/symptoms of bleeding, dietary intake, and drug-drug interactions. Make dose adjustments as necessary.  Pharmacy will continue to follow.    Thank you,    Caitlin Modi, PharmD  PGY1 Resident  5/5/2022  13:11 EDT

## 2022-05-05 NOTE — PROGRESS NOTES
[facility]    Acute pain service Inpatient Progress Note    Patient Name: Corrina Reis  :  1965  MRN:  [id number]        Acute Pain  Service Inpatient Progress Note:    Analgesia:Excellent  Pain Score:0/10  LOC: alert and awake  Resp Status: room air  Cardiac: VS stable  Side Effects:None  Catheter Site:clean, dressing intact and dry  Cath type: peripheral nerve cath with ON Q  Infusion rate: 10ml/hr  Dosing/Volume: ropivacaine 0.2%  Catheter Plan:Catheter to remain Insitu and Continue catheter infusion rate unchanged        0/10 anterior pain  3/10 posterior knee pain

## 2022-05-05 NOTE — PLAN OF CARE
Goal Outcome Evaluation:  Plan of Care Reviewed With: patient        Progress: improving  Outcome Evaluation: KI donned for all mobility. Pt improved forward ambulation distance to 100 feet with RW and CGA. Cont d/c recs for home with assist and OPPT when medically ready.

## 2022-05-05 NOTE — PROGRESS NOTES
Orthopedic Progress Note      Patient: Corrina Reis  YOB: 1965     Date of Admission: 5/4/2022  7:03 AM Medical Record Number: [id number]     Attending Physician: Rei Sutherland MD    Status Post:  Procedure(s):  IRRIGATION AND DEBRIDMENT, LEFT KNEE ANTIBIOTIC SPACER PLACEMENT Post Operative Day Number: 1    Subjective : No new orthopaedic complaints     Pain Relief: some relief with present medication.     Systemic Complaints: No Complaints    Drain had lost suction pulled this morning with the assistance of nursing reinforce dressing  Vitals:    05/04/22 2356 05/05/22 0348 05/05/22 0704 05/05/22 0831   BP: 101/60 99/66 110/67 113/67   BP Location: Left arm Left arm Left arm    Patient Position: Lying Lying Lying    Pulse: 75 66 64 84   Resp: 16 16 16    Temp: 97.7 °F (36.5 °C) 98.2 °F (36.8 °C) 97.8 °F (36.6 °C)    TempSrc: Oral Oral Oral    SpO2: 96% 96% 99%    Weight:       Height:           Physical Exam: 56 y.o. female    General Appearance:       Alert, cooperative, in no acute distress                  Extremities:    Dressing Clean, Dry and Intact             No clinical sign of DVT        Able to do good movements of digits    Pulses:   Pulses palpable and equal bilaterally           Diagnostic Tests:     Results from last 7 days   Lab Units 05/05/22  0558 05/02/22  1208   WBC 10*3/mm3 10.00 5.11   HEMOGLOBIN g/dL 9.9* 13.0   HEMATOCRIT % 28.5* 39.0   PLATELETS 10*3/mm3 155 224     Results from last 7 days   Lab Units 05/05/22  0558 05/02/22  1208   SODIUM mmol/L 140 140   POTASSIUM mmol/L 4.5 3.8   CHLORIDE mmol/L 106 103   CO2 mmol/L 23.0 27.0   BUN mg/dL 13 15   CREATININE mg/dL 0.96 1.05*   GLUCOSE mg/dL 97 109*   CALCIUM mg/dL 8.5* 9.3     Results from last 7 days   Lab Units 05/05/22  0558 05/04/22  0855 05/02/22  1208   INR  1.43* 1.35* 1.52*   APTT seconds  --   --  33.8*     No results found for: URICACID  Lab Results   Component Value Date    CRYSTAL NONE SEEN  04/29/2021     Microbiology Results (last 10 days)     Procedure Component Value - Date/Time    Tissue / Bone Culture - Tissue, Knee, Left [[id number]] Collected: 05/04/22 1114    Lab Status: Preliminary result Specimen: Tissue from Knee, Left Updated: 05/05/22 0855     Tissue Culture No growth     Gram Stain Rare (1+) WBCs seen      No organisms seen    Tissue / Bone Culture - Tissue, Knee, Left [[id number]] Collected: 05/04/22 1114    Lab Status: Preliminary result Specimen: Tissue from Knee, Left Updated: 05/05/22 0855     Tissue Culture No growth     Gram Stain No WBCs or organisms seen    Tissue / Bone Culture - Tissue, Knee, Left [[id number]] Collected: 05/04/22 1113    Lab Status: Preliminary result Specimen: Tissue from Knee, Left Updated: 05/05/22 0853     Tissue Culture No growth     Gram Stain No WBCs or organisms seen    MRSA Screen, PCR (Inpatient) - Swab, Nares [[id number]]  (Normal) Collected: 05/02/22 1208    Lab Status: Final result Specimen: Swab from Nares Updated: 05/02/22 1409     MRSA PCR Negative    Narrative:      MRSA Negative    COVID PRE-OP / PRE-PROCEDURE SCREENING ORDER (NO ISOLATION) - Swab, Nasopharynx [[id number]]  (Normal) Collected: 05/02/22 1204    Lab Status: Final result Specimen: Swab from Nasopharynx Updated: 05/02/22 1805    Narrative:      The following orders were created for panel order COVID PRE-OP / PRE-PROCEDURE SCREENING ORDER (NO ISOLATION) - Swab, Nasopharynx.  Procedure                               Abnormality         Status                     ---------                               -----------         ------                     COVID-19, APTIMA PANTHER...[[id number]]  Normal              Final result                 Please view results for these tests on the individual orders.    COVID-19, APTIMA PANTHER [facility] IN-HOUSE NP/OP SWAB IN UTM/VTM/SALINE TRANSPORT MEDIA 24HR TAT - Swab, Nasopharynx [[id number]]  (Normal) Collected: 05/02/22 1204    Lab Status: Final result  Specimen: Swab from Nasopharynx Updated: 05/02/22 1809     COVID19 Not Detected    Narrative:      Fact sheet for providers: https://www.fda.gov/media/279088/download     Fact sheet for patients: https://www.fda.gov/media/524159/download    Test performed by RT PCR.        XR Knee 1 or 2 View Left    Result Date: 5/4/2022  1. Expected postoperative changes of the left knee.  This report was finalized on 5/4/2022 2:20 PM by Chivo Sommer MD.          Current Medications:  Scheduled Meds:acetaminophen, 1,000 mg, Oral, Q8H  amitriptyline, 50 mg, Oral, Nightly  atorvastatin, 40 mg, Oral, Nightly  baclofen, 10 mg, Oral, TID  famotidine, 20 mg, Oral, Daily  gabapentin, 300 mg, Oral, TID  levothyroxine, 100 mcg, Oral, Q AM  meloxicam, 15 mg, Oral, Daily  midodrine, 5 mg, Oral, TID AC  sertraline, 100 mg, Oral, Daily  warfarin, 3 mg, Oral, Daily      Continuous Infusions:lactated ringers, 9 mL/hr, Last Rate: Stopped (05/04/22 1306)  lactated ringers, 100 mL/hr, Last Rate: 100 mL/hr (05/05/22 0202)  Pharmacy to dose warfarin,   Ropivacine HCl-NaCl, 10 mL/hr, Last Rate: 10 mL/hr (05/04/22 1308)      PRN Meds:.cyclobenzaprine  •  diphenhydrAMINE **OR** diphenhydrAMINE  •  HYDROmorphone **AND** naloxone  •  ketorolac  •  labetalol  •  ondansetron **OR** ondansetron  •  ondansetron  •  oxyCODONE  •  oxyCODONE  •  Pharmacy to dose warfarin  •  sodium chloride  •  traMADol    Assessment: Status post  NM INCIS/DRAIN THIGH/KNEE ABSCESS,DEEP [91404] (IRRIGATION AND DEBRIDMENT LEFT KNEE ANTIBIOTIC SPACER)    Patient Active Problem List   Diagnosis   • Autonomic orthostatic hypotension   • Infection of left knee (HCC)   • S/P irrigation and debridement, left knee antibiotic spacer placement   • Hyperlipidemia   • Hypothyroid   • History of DVT (deep vein thrombosis)       PLAN:   Continues current post-op course  Anticoagulation: Okay to restart patient's Coumadin  Mobilize with PT as tolerated per protocol  Knee immobilizer for 2  weeks  Incisional wound VAC  Antibiotics per infectious disease  Follow-up or cultures previously grew MRSA    Weight Bearing: WBAT  Discharge Plan: OK to plan for discharge in  today to home  from orthopaedic perspective.    Rei Sutherland MD    Date: 5/5/2022    Time: 10:12 EDT

## 2022-05-06 PROBLEM — Z79.01 CHRONIC ANTICOAGULATION: Status: ACTIVE | Noted: 2022-05-06

## 2022-05-06 LAB
ALBUMIN SERPL-MCNC: 3.9 G/DL (ref 3.5–5.2)
ALBUMIN/GLOB SERPL: 2.3 G/DL
ALP SERPL-CCNC: 77 U/L (ref 39–117)
ALT SERPL W P-5'-P-CCNC: 63 U/L (ref 1–33)
ANION GAP SERPL CALCULATED.3IONS-SCNC: 8 MMOL/L (ref 5–15)
AST SERPL-CCNC: 118 U/L (ref 1–32)
BASOPHILS # BLD AUTO: 0.05 10*3/MM3 (ref 0–0.2)
BASOPHILS NFR BLD AUTO: 0.8 % (ref 0–1.5)
BILIRUB SERPL-MCNC: 0.5 MG/DL (ref 0–1.2)
BUN SERPL-MCNC: 14 MG/DL (ref 6–20)
BUN/CREAT SERPL: 13.6 (ref 7–25)
CALCIUM SPEC-SCNC: 8.8 MG/DL (ref 8.6–10.5)
CHLORIDE SERPL-SCNC: 109 MMOL/L (ref 98–107)
CK SERPL-CCNC: 315 U/L (ref 20–180)
CO2 SERPL-SCNC: 27 MMOL/L (ref 22–29)
CREAT SERPL-MCNC: 1.03 MG/DL (ref 0.57–1)
CRP SERPL-MCNC: 5.02 MG/DL (ref 0–0.5)
DEPRECATED RDW RBC AUTO: 47.9 FL (ref 37–54)
EGFRCR SERPLBLD CKD-EPI 2021: 63.2 ML/MIN/1.73
EOSINOPHIL # BLD AUTO: 0.15 10*3/MM3 (ref 0–0.4)
EOSINOPHIL NFR BLD AUTO: 2.4 % (ref 0.3–6.2)
ERYTHROCYTE [DISTWIDTH] IN BLOOD BY AUTOMATED COUNT: 13.3 % (ref 12.3–15.4)
ERYTHROCYTE [SEDIMENTATION RATE] IN BLOOD: 10 MM/HR (ref 0–30)
GLOBULIN UR ELPH-MCNC: 1.7 GM/DL
GLUCOSE SERPL-MCNC: 139 MG/DL (ref 65–99)
HCT VFR BLD AUTO: 29.4 % (ref 34–46.6)
HGB BLD-MCNC: 9.8 G/DL (ref 12–15.9)
IMM GRANULOCYTES # BLD AUTO: 0.01 10*3/MM3 (ref 0–0.05)
IMM GRANULOCYTES NFR BLD AUTO: 0.2 % (ref 0–0.5)
INR PPP: 1.49 (ref 0.84–1.13)
LYMPHOCYTES # BLD AUTO: 1.28 10*3/MM3 (ref 0.7–3.1)
LYMPHOCYTES NFR BLD AUTO: 20.5 % (ref 19.6–45.3)
MCH RBC QN AUTO: 32.5 PG (ref 26.6–33)
MCHC RBC AUTO-ENTMCNC: 33.3 G/DL (ref 31.5–35.7)
MCV RBC AUTO: 97.4 FL (ref 79–97)
MONOCYTES # BLD AUTO: 0.56 10*3/MM3 (ref 0.1–0.9)
MONOCYTES NFR BLD AUTO: 9 % (ref 5–12)
NEUTROPHILS NFR BLD AUTO: 4.2 10*3/MM3 (ref 1.7–7)
NEUTROPHILS NFR BLD AUTO: 67.1 % (ref 42.7–76)
NRBC BLD AUTO-RTO: 0 /100 WBC (ref 0–0.2)
PLATELET # BLD AUTO: 171 10*3/MM3 (ref 140–450)
PMV BLD AUTO: 10.6 FL (ref 6–12)
POTASSIUM SERPL-SCNC: 4.1 MMOL/L (ref 3.5–5.2)
PROT SERPL-MCNC: 5.6 G/DL (ref 6–8.5)
PROTHROMBIN TIME: 18 SECONDS (ref 11.4–14.4)
RBC # BLD AUTO: 3.02 10*6/MM3 (ref 3.77–5.28)
SODIUM SERPL-SCNC: 144 MMOL/L (ref 136–145)
WBC NRBC COR # BLD: 6.25 10*3/MM3 (ref 3.4–10.8)

## 2022-05-06 PROCEDURE — 25010000002 DAPTOMYCIN PER 1 MG: Performed by: INTERNAL MEDICINE

## 2022-05-06 PROCEDURE — 97530 THERAPEUTIC ACTIVITIES: CPT

## 2022-05-06 PROCEDURE — 25010000002 KETOROLAC TROMETHAMINE PER 15 MG: Performed by: ORTHOPAEDIC SURGERY

## 2022-05-06 PROCEDURE — 85025 COMPLETE CBC W/AUTO DIFF WBC: CPT | Performed by: INTERNAL MEDICINE

## 2022-05-06 PROCEDURE — C1751 CATH, INF, PER/CENT/MIDLINE: HCPCS

## 2022-05-06 PROCEDURE — 02HV33Z INSERTION OF INFUSION DEVICE INTO SUPERIOR VENA CAVA, PERCUTANEOUS APPROACH: ICD-10-PCS | Performed by: INTERNAL MEDICINE

## 2022-05-06 PROCEDURE — 86140 C-REACTIVE PROTEIN: CPT | Performed by: INTERNAL MEDICINE

## 2022-05-06 PROCEDURE — 80053 COMPREHEN METABOLIC PANEL: CPT | Performed by: INTERNAL MEDICINE

## 2022-05-06 PROCEDURE — 97116 GAIT TRAINING THERAPY: CPT

## 2022-05-06 PROCEDURE — 85610 PROTHROMBIN TIME: CPT

## 2022-05-06 PROCEDURE — 85652 RBC SED RATE AUTOMATED: CPT | Performed by: INTERNAL MEDICINE

## 2022-05-06 PROCEDURE — 82550 ASSAY OF CK (CPK): CPT | Performed by: INTERNAL MEDICINE

## 2022-05-06 PROCEDURE — C1894 INTRO/SHEATH, NON-LASER: HCPCS

## 2022-05-06 RX ORDER — SODIUM CHLORIDE 0.9 % (FLUSH) 0.9 %
10 SYRINGE (ML) INJECTION AS NEEDED
Status: DISCONTINUED | OUTPATIENT
Start: 2022-05-06 | End: 2022-05-10 | Stop reason: HOSPADM

## 2022-05-06 RX ORDER — ONDANSETRON 4 MG/1
4 TABLET, FILM COATED ORAL EVERY 8 HOURS PRN
Start: 2022-05-06 | End: 2023-03-13

## 2022-05-06 RX ORDER — ATORVASTATIN CALCIUM 40 MG/1
40 TABLET, FILM COATED ORAL NIGHTLY
Start: 2022-06-17

## 2022-05-06 RX ORDER — SODIUM CHLORIDE 0.9 % (FLUSH) 0.9 %
10 SYRINGE (ML) INJECTION EVERY 12 HOURS SCHEDULED
Status: DISCONTINUED | OUTPATIENT
Start: 2022-05-06 | End: 2022-05-10 | Stop reason: HOSPADM

## 2022-05-06 RX ORDER — MELOXICAM 15 MG/1
15 TABLET ORAL DAILY
Qty: 15 TABLET | Refills: 0
Start: 2022-05-06 | End: 2022-05-21

## 2022-05-06 RX ORDER — SULINDAC 150 MG/1
150 TABLET ORAL DAILY
Start: 2022-05-20 | End: 2022-06-27

## 2022-05-06 RX ORDER — TRAMADOL HYDROCHLORIDE 50 MG/1
50 TABLET ORAL EVERY 6 HOURS PRN
Start: 2022-05-06 | End: 2022-06-27

## 2022-05-06 RX ORDER — OXYCODONE HYDROCHLORIDE 5 MG/1
5 TABLET ORAL EVERY 4 HOURS PRN
Refills: 0
Start: 2022-05-06 | End: 2022-06-27

## 2022-05-06 RX ORDER — ACETAMINOPHEN 500 MG
1000 TABLET ORAL EVERY 8 HOURS
Qty: 42 TABLET | Refills: 0
Start: 2022-05-06 | End: 2022-05-13

## 2022-05-06 RX ORDER — DOCUSATE SODIUM 100 MG/1
100 CAPSULE, LIQUID FILLED ORAL 2 TIMES DAILY
Start: 2022-05-06

## 2022-05-06 RX ADMIN — BACLOFEN 10 MG: 10 TABLET ORAL at 09:03

## 2022-05-06 RX ADMIN — MELOXICAM 15 MG: 7.5 TABLET ORAL at 09:05

## 2022-05-06 RX ADMIN — ACETAMINOPHEN 1000 MG: 500 TABLET ORAL at 05:08

## 2022-05-06 RX ADMIN — TRAMADOL HYDROCHLORIDE 50 MG: 50 TABLET, COATED ORAL at 05:08

## 2022-05-06 RX ADMIN — Medication 10 ML: at 19:48

## 2022-05-06 RX ADMIN — GABAPENTIN 300 MG: 300 CAPSULE ORAL at 15:02

## 2022-05-06 RX ADMIN — FAMOTIDINE 20 MG: 20 TABLET ORAL at 09:05

## 2022-05-06 RX ADMIN — SERTRALINE 100 MG: 100 TABLET, FILM COATED ORAL at 09:04

## 2022-05-06 RX ADMIN — ACETAMINOPHEN 1000 MG: 500 TABLET ORAL at 15:02

## 2022-05-06 RX ADMIN — MIDODRINE HYDROCHLORIDE 5 MG: 5 TABLET ORAL at 09:07

## 2022-05-06 RX ADMIN — MIDODRINE HYDROCHLORIDE 5 MG: 5 TABLET ORAL at 17:40

## 2022-05-06 RX ADMIN — BACLOFEN 10 MG: 10 TABLET ORAL at 19:48

## 2022-05-06 RX ADMIN — GABAPENTIN 300 MG: 300 CAPSULE ORAL at 19:48

## 2022-05-06 RX ADMIN — ACETAMINOPHEN 1000 MG: 500 TABLET ORAL at 22:15

## 2022-05-06 RX ADMIN — TRAMADOL HYDROCHLORIDE 50 MG: 50 TABLET, COATED ORAL at 15:02

## 2022-05-06 RX ADMIN — KETOROLAC TROMETHAMINE 15 MG: 30 INJECTION, SOLUTION INTRAMUSCULAR; INTRAVENOUS at 05:20

## 2022-05-06 RX ADMIN — GABAPENTIN 300 MG: 300 CAPSULE ORAL at 09:04

## 2022-05-06 RX ADMIN — LEVOTHYROXINE SODIUM 100 MCG: 0.1 TABLET ORAL at 05:08

## 2022-05-06 RX ADMIN — OXYCODONE 5 MG: 5 TABLET ORAL at 11:35

## 2022-05-06 RX ADMIN — MIDODRINE HYDROCHLORIDE 5 MG: 5 TABLET ORAL at 11:35

## 2022-05-06 RX ADMIN — DAPTOMYCIN 500 MG: 500 INJECTION, POWDER, LYOPHILIZED, FOR SOLUTION INTRAVENOUS at 11:38

## 2022-05-06 RX ADMIN — Medication 10 ML: at 12:23

## 2022-05-06 RX ADMIN — AMITRIPTYLINE HYDROCHLORIDE 50 MG: 50 TABLET, FILM COATED ORAL at 19:48

## 2022-05-06 RX ADMIN — WARFARIN SODIUM 5 MG: 5 TABLET ORAL at 17:40

## 2022-05-06 RX ADMIN — BACLOFEN 10 MG: 10 TABLET ORAL at 15:02

## 2022-05-06 NOTE — THERAPY TREATMENT NOTE
Patient Name: Corrina Reis  : 1965    MRN: [id number]                              Today's Date: 2022       Admit Date: 2022    Visit Dx:     ICD-10-CM ICD-9-CM   1. S/P irrigation and debridement, left knee antibiotic spacer placement  Z98.890 V45.89   2. Infection of left knee (HCC)  M00.9 686.9     Patient Active Problem List   Diagnosis   • Autonomic orthostatic hypotension   • Infection of left knee (HCC)   • S/P irrigation and debridement, left knee antibiotic spacer placement   • Hyperlipidemia   • Hypothyroid   • History of DVT (deep vein thrombosis)   • Chronic anticoagulation     Past Medical History:   Diagnosis Date   • Anxiety    • Borderline diabetes     daily at home checks   • Deep vein thrombosis (HCC)     left leg  and    • GERD (gastroesophageal reflux disease)    • History of migraine     botox shots for migraines   • History of pulmonary embolus (PE)    • History of transfusion     autologus blood with back surgery   • Hyperlipidemia    • Hypertension    • IBS (irritable bowel syndrome)    • MRSA infection 2021    MRSA of left knee; IV antibioitcs x 4 weeks and then 6 weeks of PO antibiotics   • PONV (postoperative nausea and vomiting)    • Pyogenic arthritis of left hip (HCC) 2021   • Thyroid nodule     nodules are monitored     Past Surgical History:   Procedure Laterality Date   • APPENDECTOMY     • BACK SURGERY      Daniel removed   • CHOLECYSTECTOMY     • COLONOSCOPY     • ENDOMETRIAL ABLATION     • HYSTERECTOMY     • SPINAL CORD STIMULATOR IMPLANT     • SPINAL FUSION     • THORACIC OUTLET SURGERY      -partial removal of sternum -rib removal   • TOTAL KNEE  PROSTHESIS REMOVAL W/ SPACER INSERTION Left 2022    Procedure: IRRIGATION AND DEBRIDMENT, LEFT KNEE ANTIBIOTIC SPACER PLACEMENT;  Surgeon: Rei Sutherland MD;  Location: [facility];  Service: Orthopedics;  Laterality: Left;      General Information     Row Name  05/06/22 1616          Physical Therapy Time and Intention    Document Type therapy note (daily note)  -     Mode of Treatment physical therapy  -     Row Name 05/06/22 1616          General Information    Patient Profile Reviewed yes  -     Existing Precautions/Restrictions hip;brace worn when out of bed;other (see comments)  hemovac, femoral nerve cath  -     Barriers to Rehab medically complex  -     Row Name 05/06/22 1616          Cognition    Orientation Status (Cognition) oriented x 4  -     Row Name 05/06/22 1616          Safety Issues, Functional Mobility    Safety Issues Affecting Function (Mobility) safety precaution awareness;safety precautions follow-through/compliance;sequencing abilities  -     Impairments Affecting Function (Mobility) endurance/activity tolerance;strength;pain;range of motion (ROM);balance;postural/trunk control  -           User Key  (r) = Recorded By, (t) = Taken By, (c) = Cosigned By    Initials Name Provider Type     Ludy Perez PT Physical Therapist               Mobility     Row Name 05/06/22 1618          Bed Mobility    Bed Mobility scooting/bridging;supine-sit  -     Scooting/Bridging [place] (Bed Mobility) supervision;verbal cues  -     Supine-Sit [place] (Bed Mobility) verbal cues;minimum assist (75% patient effort)  -     Assistive Device (Bed Mobility) head of bed elevated;bed rails  -     Comment, (Bed Mobility) VC for sequencing; min assist for LLE - issued leg   -     Row Name 05/06/22 1618          Sit-Stand Transfer    Sit-Stand [place] (Transfers) verbal cues;contact guard  -     Assistive Device (Sit-Stand Transfers) walker, front-wheeled  -     Comment, (Sit-Stand Transfer) VC for hand placement, stepping out LLE, lowering with eccentric control  -     Row Name 05/06/22 1618          Gait/Stairs (Locomotion)    [place] Level (Gait) verbal cues;contact guard  -     Assistive Device (Gait) walker,  front-wheeled  -     Distance in Feet (Gait) 100  -     Deviations/Abnormal Patterns (Gait) bilateral deviations;kelsey decreased;gait speed decreased;stride length decreased  -     Bilateral Gait Deviations forward flexed posture  -     Left Sided Gait Deviations weight shift ability decreased;heel strike decreased  -     Comment, (Gait/Stairs) Pt. ambulated with a step through gait pattern w/pausing between steps. VC for upright posture, decreased step length on LLE and increased step length on RLE, continuous forward progression w/AD. Gait limited by pain. Increased time/effort required.  -     Row Name 05/06/22 1618          Mobility    Extremity Weight-bearing Status left lower extremity  -     Left Lower Extremity (Weight-bearing Status) weight-bearing as tolerated (WBAT)  w/KI  -           User Key  (r) = Recorded By, (t) = Taken By, (c) = Cosigned By    Initials Name Provider Type     Ludy Perez PT Physical Therapist               Obj/Interventions     [facility] Name 05/06/22 1621          Motor Skills    Therapeutic Exercise hip;knee;ankle  -[facility] Name 05/06/22 1621          Hip (Therapeutic Exercise)    Hip (Therapeutic Exercise) strengthening exercise  -     Hip Strengthening (Therapeutic Exercise) left;aBduction;aDduction;15 repititions  -[facility] Name 05/06/22 1621          Knee (Therapeutic Exercise)    Knee (Therapeutic Exercise) isometric exercises;strengthening exercise  -     Knee Isometrics (Therapeutic Exercise) left;quad sets;10 repetitions;5 repetitions  -     Knee Strengthening (Therapeutic Exercise) left;SLR (straight leg raise);15 repititions  min assist  -[facility] Name 05/06/22 1621          Ankle (Therapeutic Exercise)    Ankle (Therapeutic Exercise) AROM (active range of motion)  -     Ankle AROM (Therapeutic Exercise) bilateral;dorsiflexion;plantarflexion;15 repititions  -[facility] Name 05/06/22 1621          Balance    Balance Assessment sitting  static balance;sit to stand dynamic balance;sitting dynamic balance;standing static balance;standing dynamic balance  -SS     Static Sitting Balance independent  -SS     Dynamic Sitting Balance independent  -SS     Position, Sitting Balance unsupported;sitting edge of bed  -SS     Sit to Stand Dynamic Balance contact guard  -SS     Static Standing Balance contact guard  -SS     Dynamic Standing Balance contact guard  -SS     Position/Device Used, Standing Balance supported;walker, front-wheeled  -SS     Balance Interventions sitting;standing;sit to stand;supported;static;dynamic  -SS           User Key  (r) = Recorded By, (t) = Taken By, (c) = Cosigned By    Initials Name Provider Type    SS Ludy Perez, PT Physical Therapist               Goals/Plan     Row Name 05/06/22 1625          Bed Mobility Goal 1 (PT)    Activity/Assistive Device (Bed Mobility Goal 1, PT) sit to supine/supine to sit  -SS     [place] Level/Cues Needed (Bed Mobility Goal 1, PT) independent  -SS     Time Frame (Bed Mobility Goal 1, PT) long term goal (LTG);10 days  -SS     Progress/Outcomes (Bed Mobility Goal 1, PT) goal revised this date;goal ongoing  -SS     Row Name 05/06/22 1625          Transfer Goal 1 (PT)    Activity/Assistive Device (Transfer Goal 1, PT) sit-to-stand/stand-to-sit;walker, rolling  -SS     [place] Level/Cues Needed (Transfer Goal 1, PT) independent  -SS     Time Frame (Transfer Goal 1, PT) long term goal (LTG);10 days  -SS     Progress/Outcome (Transfer Goal 1, PT) goal revised this date;goal ongoing  -SS     Row Name 05/06/22 1625          Gait Training Goal 1 (PT)    Activity/Assistive Device (Gait Training Goal 1, PT) gait (walking locomotion);walker, rolling  -SS     [place] Level (Gait Training Goal 1, PT) modified independence  -SS     Distance (Gait Training Goal 1, PT) 300  -SS     Time Frame (Gait Training Goal 1, PT) long term goal (LTG);10 days  -SS     Progress/Outcome (Gait Training Goal 1,  PT) goal revised this date;goal ongoing  -           User Key  (r) = Recorded By, (t) = Taken By, (c) = Cosigned By    Initials Name Provider Type    SS Ludy Perez, PT Physical Therapist               Clinical Impression     Row Name 05/06/22 1623          Pain    Pretreatment Pain Rating 3/10  -     Posttreatment Pain Rating 6/10  -     Pain Location - Side/Orientation Left  -SS     Pain Location posterior  -     Pain Location - knee  -     Pain Intervention(s) Cold applied;Repositioned;Ambulation/increased activity;Elevated  -     Additional Documentation Pain Scale: Numbers Pre/Post-Treatment (Group)  -     Row Name 05/06/22 1623          Plan of Care Review    Plan of Care Reviewed With patient  -     Progress improving  -     Outcome Evaluation Pt. performed bed mobility with min assist and sit to stand transfer w/contact guard assist. She ambulated 100' w/front wheeled walker, contact guard assist. Gait limited by pain. She tolerated ther-ex well. Will continue to progress as able.  -     Row Name 05/06/22 1623          Therapy Assessment/Plan (PT)    Rehab Potential (PT) good, to achieve stated therapy goals  -     Criteria for Skilled Interventions Met (PT) yes;meets criteria;skilled treatment is necessary  -     Therapy Frequency (PT) 2 times/day  -     Row Name 05/06/22 1623          Vital Signs    Pre Systolic BP Rehab --  VSS, RN cleared  -     Row Name 05/06/22 1623          Positioning and Restraints    Pre-Treatment Position in bed  -     Post Treatment Position chair  -SS     In Chair notified nsg;reclined;call light within reach;encouraged to call for assist;exit alarm on;legs elevated  -           User Key  (r) = Recorded By, (t) = Taken By, (c) = Cosigned By    Initials Name Provider Type    SS Ludy Perez, PT Physical Therapist               Outcome Measures     Row Name 05/06/22 1626 05/06/22 0845       How much help from another person do you currently  need...    Turning from your back to your side while in flat bed without using bedrails? 3  -SS 4  -KB    Moving from lying on back to sitting on the side of a flat bed without bedrails? 3  -SS 3  -KB    Moving to and from a bed to a chair (including a wheelchair)? 3  -SS 3  -KB    Standing up from a chair using your arms (e.g., wheelchair, bedside chair)? 3  -SS 3  -KB    Climbing 3-5 steps with a railing? 2  -SS 3  -KB    To walk in hospital room? 3  -SS 3  -KB    AM-PAC 6 Clicks Score (PT) 17  -SS 19  -KB    Highest level of mobility 5 --> Static standing  - 6 --> Walked 10 steps or more  -KB    Row Name 05/06/22 1626          Functional Assessment    Outcome Measure Options AM-PAC 6 Clicks Basic Mobility (PT)  -           User Key  (r) = Recorded By, (t) = Taken By, (c) = Cosigned By    Initials Name Provider Type    Margie Gloria RN Registered Nurse    Ludy Sy, PT Physical Therapist                             Physical Therapy Education                 Title: PT OT SLP Therapies (Done)     Topic: Physical Therapy (Done)     Point: Mobility training (Done)     Learning Progress Summary           Patient Eager, E, VU,NR by  at 5/6/2022 1626    Comment: Reviewed safety/technique with bed mobility, transfers, ambulation, HEP, KI, WB status, PT POC    Acceptance, E,TB, VU by  at 5/5/2022 1526    Acceptance, E,TB, VU by  at 5/5/2022 0940    Acceptance, E,D, VU by TED at 5/4/2022 1345    Comment: Educated on safe sequencing with bed mobility, ambulatory transfers, and gait. Reviewed HEP and knee precautions.                   Point: Home exercise program (Done)     Learning Progress Summary           Patient Eager, E, VU,NR by  at 5/6/2022 1626    Comment: Reviewed safety/technique with bed mobility, transfers, ambulation, HEP, KI, WB status, PT POC    Acceptance, E,TB, VU by  at 5/5/2022 1526    Acceptance, E,TB, VU by  at 5/5/2022 0940    Acceptance, E,D, VU by TED at 5/4/2022 1345     Comment: Educated on safe sequencing with bed mobility, ambulatory transfers, and gait. Reviewed HEP and knee precautions.                   Point: Body mechanics (Done)     Learning Progress Summary           Patient Eager, E, VU,NR by  at 5/6/2022 1626    Comment: Reviewed safety/technique with bed mobility, transfers, ambulation, HEP, KI, WB status, PT POC    Acceptance, E,TB, VU by  at 5/5/2022 1526    Acceptance, E,TB, VU by  at 5/5/2022 0940    Acceptance, E,D, VU by  at 5/4/2022 1345    Comment: Educated on safe sequencing with bed mobility, ambulatory transfers, and gait. Reviewed HEP and knee precautions.                   Point: Precautions (Done)     Learning Progress Summary           Patient Eager, E, VU,NR by  at 5/6/2022 1626    Comment: Reviewed safety/technique with bed mobility, transfers, ambulation, HEP, KI, WB status, PT POC    Acceptance, E,TB, VU by  at 5/5/2022 1526    Acceptance, E,TB, VU by  at 5/5/2022 0940    Acceptance, E,D, VU by  at 5/4/2022 1345    Comment: Educated on safe sequencing with bed mobility, ambulatory transfers, and gait. Reviewed HEP and knee precautions.                               User Key     Initials Effective Dates Name Provider Type Discipline     06/16/21 -  Lev Harper, PT Physical Therapist PT     09/22/20 -  Reinaldo Bridges, PT Physical Therapist PT     06/01/21 -  Ludy Perez, PT Physical Therapist PT              PT Recommendation and Plan     Plan of Care Reviewed With: patient  Progress: improving  Outcome Evaluation: Pt. performed bed mobility with min assist and sit to stand transfer w/contact guard assist. She ambulated 100' w/front wheeled walker, contact guard assist. Gait limited by pain. She tolerated ther-ex well. Will continue to progress as able.     Time Calculation:    PT Charges     Row Name 05/06/22 1626             Time Calculation    Start Time 1043  -      Stop Time 1111  -      Time Calculation (min) 28 min   -SS      PT Received On 05/06/22  -              Time Calculation- PT    Total Timed Code Minutes- PT 28 minute(s)  -SS              Timed Charges    70525 - PT Therapeutic Exercise Minutes 5  -SS      63907 - Gait Training Minutes  15  -SS      68722 - PT Therapeutic Activity Minutes 8  -SS              Total Minutes    Timed Charges Total Minutes 28  -SS       Total Minutes 28  -SS            User Key  (r) = Recorded By, (t) = Taken By, (c) = Cosigned By    Initials Name Provider Type     Ludy Perez, PT Physical Therapist              Therapy Charges for Today     Code Description Service Date Service Provider Modifiers Qty    39180742142 HC GAIT TRAINING EA 15 MIN 5/6/2022 Ludy Perez, PT GP 1    [id number] HC PT THERAPEUTIC ACT EA 15 MIN 5/6/2022 Ludy Perez, PT GP 1          PT G-Codes  Outcome Measure Options: AM-PAC 6 Clicks Basic Mobility (PT)  AM-PAC 6 Clicks Score (PT): 17    Ludy Perez PT  5/6/2022

## 2022-05-06 NOTE — PLAN OF CARE
Goal Outcome Evaluation:  Plan of Care Reviewed With: patient        Progress: improving  Outcome Evaluation: Pt. musa sit to stand transfer w/contact guard assist. She ambulated 200' w/front wheeled walker, contact guard assist. Gait limited by pain, fatigue. Increased effort required. She tolerated ther-ex well. Will continue to progress as able.

## 2022-05-06 NOTE — THERAPY TREATMENT NOTE
Patient Name: Corrina Reis  : 1965    MRN: [id number]                              Today's Date: 2022       Admit Date: 2022    Visit Dx:     ICD-10-CM ICD-9-CM   1. S/P irrigation and debridement, left knee antibiotic spacer placement  Z98.890 V45.89   2. Infection of left knee (HCC)  M00.9 686.9     Patient Active Problem List   Diagnosis   • Autonomic orthostatic hypotension   • Infection of left knee (HCC)   • S/P irrigation and debridement, left knee antibiotic spacer placement   • Hyperlipidemia   • Hypothyroid   • History of DVT (deep vein thrombosis)   • Chronic anticoagulation     Past Medical History:   Diagnosis Date   • Anxiety    • Borderline diabetes     daily at home checks   • Deep vein thrombosis (HCC)     left leg  and    • GERD (gastroesophageal reflux disease)    • History of migraine     botox shots for migraines   • History of pulmonary embolus (PE)    • History of transfusion     autologus blood with back surgery   • Hyperlipidemia    • Hypertension    • IBS (irritable bowel syndrome)    • MRSA infection 2021    MRSA of left knee; IV antibioitcs x 4 weeks and then 6 weeks of PO antibiotics   • PONV (postoperative nausea and vomiting)    • Pyogenic arthritis of left hip (HCC) 2021   • Thyroid nodule     nodules are monitored     Past Surgical History:   Procedure Laterality Date   • APPENDECTOMY     • BACK SURGERY      Daniel removed   • CHOLECYSTECTOMY     • COLONOSCOPY     • ENDOMETRIAL ABLATION     • HYSTERECTOMY     • SPINAL CORD STIMULATOR IMPLANT     • SPINAL FUSION     • THORACIC OUTLET SURGERY      -partial removal of sternum -rib removal   • TOTAL KNEE  PROSTHESIS REMOVAL W/ SPACER INSERTION Left 2022    Procedure: IRRIGATION AND DEBRIDMENT, LEFT KNEE ANTIBIOTIC SPACER PLACEMENT;  Surgeon: Rei Sutherland MD;  Location: [facility];  Service: Orthopedics;  Laterality: Left;      General Information     Row Name  05/06/22 1628 05/06/22 1616       Physical Therapy Time and Intention    Document Type therapy note (daily note)  - therapy note (daily note)  -    Mode of Treatment physical therapy  - physical therapy  -SS    Row Name 05/06/22 1628 05/06/22 1616       General Information    Patient Profile Reviewed yes  -SS yes  -SS    Existing Precautions/Restrictions hip;brace worn when out of bed;other (see comments)  hemovac, femoral nerve cath  -SS hip;brace worn when out of bed;other (see comments)  hemovac, femoral nerve cath  -SS    Barriers to Rehab medically complex  - medically complex  -SS    Row Name 05/06/22 1628 05/06/22 1616       Cognition    Orientation Status (Cognition) oriented x 4  -SS oriented x 4  -SS    Row Name 05/06/22 1628 05/06/22 1616       Safety Issues, Functional Mobility    Safety Issues Affecting Function (Mobility) positioning of assistive device;safety precaution awareness;safety precautions follow-through/compliance;sequencing abilities  - safety precaution awareness;safety precautions follow-through/compliance;sequencing abilities  -    Impairments Affecting Function (Mobility) endurance/activity tolerance;strength;pain;range of motion (ROM);balance;postural/trunk control  - endurance/activity tolerance;strength;pain;range of motion (ROM);balance;postural/trunk control  -          User Key  (r) = Recorded By, (t) = Taken By, (c) = Cosigned By    Initials Name Provider Type     Ludy Perez PT Physical Therapist               Mobility     Row Name 05/06/22 1618          Bed Mobility    Bed Mobility scooting/bridging;supine-sit  -     Scooting/Bridging [place] (Bed Mobility) supervision;verbal cues  -     Supine-Sit [place] (Bed Mobility) verbal cues;minimum assist (75% patient effort)  -     Assistive Device (Bed Mobility) head of bed elevated;bed rails  -     Comment, (Bed Mobility) VC for sequencing; min assist for LLE - issued leg   -     Row  Name 05/06/22 1629 05/06/22 1618       Sit-Stand Transfer    Sit-Stand [place] (Transfers) verbal cues;contact guard  -SS verbal cues;contact guard  -SS    Assistive Device (Sit-Stand Transfers) walker, front-wheeled  -SS walker, front-wheeled  -SS    Comment, (Sit-Stand Transfer) VC for hand placement, stepping out LLE, lowering with eccentric control  -SS VC for hand placement, stepping out LLE, lowering with eccentric control  -SS    Row Name 05/06/22 1629 05/06/22 1618       Gait/Stairs (Locomotion)    [place] Level (Gait) verbal cues;contact guard  -SS verbal cues;contact guard  -SS    Assistive Device (Gait) walker, front-wheeled  -SS walker, front-wheeled  -SS    Distance in Feet (Gait) 200  -  -SS    Deviations/Abnormal Patterns (Gait) bilateral deviations;kelsey decreased;gait speed decreased;stride length decreased  -SS bilateral deviations;kelsey decreased;gait speed decreased;stride length decreased  -SS    Bilateral Gait Deviations forward flexed posture  -SS forward flexed posture  -SS    Left Sided Gait Deviations weight shift ability decreased;heel strike decreased  -SS weight shift ability decreased;heel strike decreased  -SS    Comment, (Gait/Stairs) Pt. ambulated with a step through gait pattern. VC for upright posture, continuous forward progression of AD. Gait limited by pain. Increased effort required for ambulation.  -SS Pt. ambulated with a step through gait pattern w/pausing between steps. VC for upright posture, decreased step length on LLE and increased step length on RLE, continuous forward progression w/AD. Gait limited by pain. Increased time/effort required.  -    Row Name 05/06/22 1629 05/06/22 1618       Mobility    Extremity Weight-bearing Status left lower extremity  -SS left lower extremity  -SS    Left Lower Extremity (Weight-bearing Status) weight-bearing as tolerated (WBAT)  w/knee immobilizer  -SS weight-bearing as tolerated (WBAT)  w/KI  -SS          User  Key  (r) = Recorded By, (t) = Taken By, (c) = Cosigned By    Initials Name Provider Type     Ludy Perez PT Physical Therapist               Obj/Interventions     Row Name 05/06/22 1633 05/06/22 1621       Motor Skills    Therapeutic Exercise hip;knee;ankle  - hip;knee;ankle  -    Row Name 05/06/22 1633 05/06/22 1621       Hip (Therapeutic Exercise)    Hip (Therapeutic Exercise) strengthening exercise  - strengthening exercise  -    Hip Strengthening (Therapeutic Exercise) left;aBduction;aDduction;15 repititions  - left;aBduction;aDduction;15 repititions  -    Row Name 05/06/22 1633 05/06/22 1621       Knee (Therapeutic Exercise)    Knee (Therapeutic Exercise) strengthening exercise;isometric exercises  - isometric exercises;strengthening exercise  -    Knee Isometrics (Therapeutic Exercise) left;quad sets;10 repetitions;5 repetitions  - left;quad sets;10 repetitions;5 repetitions  -    Knee Strengthening (Therapeutic Exercise) left;SLR (straight leg raise);15 repititions  - left;SLR (straight leg raise);15 repititions  min assist  -    Row Name 05/06/22 1633 05/06/22 1621       Ankle (Therapeutic Exercise)    Ankle (Therapeutic Exercise) AROM (active range of motion)  - AROM (active range of motion)  -    Ankle AROM (Therapeutic Exercise) bilateral;plantarflexion;15 repititions  - bilateral;dorsiflexion;plantarflexion;15 repititions  -    Row Name 05/06/22 1633 05/06/22 1621       Balance    Balance Assessment sitting static balance;sitting dynamic balance;sit to stand dynamic balance;standing static balance;standing dynamic balance  - sitting static balance;sit to stand dynamic balance;sitting dynamic balance;standing static balance;standing dynamic balance  -    Static Sitting Balance independent  - independent  -    Dynamic Sitting Balance independent  -SS independent  -SS    Position, Sitting Balance unsupported;sitting in chair  -SS unsupported;sitting edge of bed   -SS    Sit to Stand Dynamic Balance contact guard  -SS contact guard  -SS    Static Standing Balance contact guard  -SS contact guard  -SS    Dynamic Standing Balance contact guard  -SS contact guard  -SS    Position/Device Used, Standing Balance supported;walker, front-wheeled  -SS supported;walker, front-wheeled  -SS    Balance Interventions standing;sitting;sit to stand;supported;static;dynamic  -SS sitting;standing;sit to stand;supported;static;dynamic  -SS          User Key  (r) = Recorded By, (t) = Taken By, (c) = Cosigned By    Initials Name Provider Type    SS Ludy Perez, PT Physical Therapist               Goals/Plan     Row Name 05/06/22 1625          Bed Mobility Goal 1 (PT)    Activity/Assistive Device (Bed Mobility Goal 1, PT) sit to supine/supine to sit  -SS     [place] Level/Cues Needed (Bed Mobility Goal 1, PT) independent  -SS     Time Frame (Bed Mobility Goal 1, PT) long term goal (LTG);10 days  -SS     Progress/Outcomes (Bed Mobility Goal 1, PT) goal revised this date;goal ongoing  -SS     Row Name 05/06/22 1625          Transfer Goal 1 (PT)    Activity/Assistive Device (Transfer Goal 1, PT) sit-to-stand/stand-to-sit;walker, rolling  -SS     [place] Level/Cues Needed (Transfer Goal 1, PT) independent  -SS     Time Frame (Transfer Goal 1, PT) long term goal (LTG);10 days  -SS     Progress/Outcome (Transfer Goal 1, PT) goal revised this date;goal ongoing  -     Row Name 05/06/22 1625          Gait Training Goal 1 (PT)    Activity/Assistive Device (Gait Training Goal 1, PT) gait (walking locomotion);walker, rolling  -SS     [place] Level (Gait Training Goal 1, PT) modified independence  -SS     Distance (Gait Training Goal 1, PT) 300  -SS     Time Frame (Gait Training Goal 1, PT) long term goal (LTG);10 days  -SS     Progress/Outcome (Gait Training Goal 1, PT) goal revised this date;goal ongoing  -SS           User Key  (r) = Recorded By, (t) = Taken By, (c) = Cosigned By     Initials Name Provider Type    SS Ludy Perez, PT Physical Therapist               Clinical Impression     Row Name 05/06/22 1634 05/06/22 1623       Pain    Pretreatment Pain Rating 5/10  -SS 3/10  -SS    Posttreatment Pain Rating 6/10  -SS 6/10  -SS    Pain Location - Side/Orientation Left  -SS Left  -SS    Pain Location generalized  -SS posterior  -SS    Pain Location - knee  -SS knee  -SS    Pain Intervention(s) Cold applied;Repositioned;Ambulation/increased activity;Elevated  -SS Cold applied;Repositioned;Ambulation/increased activity;Elevated  -SS    Additional Documentation Pain Scale: Numbers Pre/Post-Treatment (Group)  -SS Pain Scale: Numbers Pre/Post-Treatment (Group)  -SS    Row Name 05/06/22 1634 05/06/22 1623       Plan of Care Review    Plan of Care Reviewed With patient  -SS patient  -SS    Progress improving  -SS improving  -SS    Outcome Evaluation Pt. perforemd sit to stand transfer w/contact guard assist. She ambulated 200' w/front wheeled walker, contact guard assist. Gait limited by pain, fatigue. Increased effort required. She tolerated ther-ex well. Will continue to progress as able.  - Pt. performed bed mobility with min assist and sit to stand transfer w/contact guard assist. She ambulated 100' w/front wheeled walker, contact guard assist. Gait limited by pain. She tolerated ther-ex well. Will continue to progress as able.  -    Row Name 05/06/22 1634 05/06/22 1623       Therapy Assessment/Plan (PT)    Rehab Potential (PT) good, to achieve stated therapy goals  -SS good, to achieve stated therapy goals  -    Criteria for Skilled Interventions Met (PT) yes;meets criteria;skilled treatment is necessary  - yes;meets criteria;skilled treatment is necessary  -    Therapy Frequency (PT) 2 times/day  -SS 2 times/day  -    Row Name 05/06/22 1634 05/06/22 1623       Vital Signs    Pre Systolic BP Rehab --  VSS, RN cleared  -SS --  VSS, RN cleared  -SS    Row Name 05/06/22 1634 05/06/22  1623       Positioning and Restraints    Pre-Treatment Position sitting in chair/recliner  -SS in bed  -SS    Post Treatment Position chair  -SS chair  -SS    In Chair notified nsg;reclined;call light within reach;encouraged to call for assist;exit alarm on;legs elevated  -SS notified nsg;reclined;call light within reach;encouraged to call for assist;exit alarm on;legs elevated  -SS          User Key  (r) = Recorded By, (t) = Taken By, (c) = Cosigned By    Initials Name Provider Type    SS Ludy Perez, PT Physical Therapist               Outcome Measures     Row Name 05/06/22 1636 05/06/22 1626       How much help from another person do you currently need...    Turning from your back to your side while in flat bed without using bedrails? 3  -SS 3  -SS    Moving from lying on back to sitting on the side of a flat bed without bedrails? 3  -SS 3  -SS    Moving to and from a bed to a chair (including a wheelchair)? 3  -SS 3  -SS    Standing up from a chair using your arms (e.g., wheelchair, bedside chair)? 3  -SS 3  -SS    Climbing 3-5 steps with a railing? 2  -SS 2  -SS    To walk in hospital room? 3  -SS 3  -SS    AM-PAC 6 Clicks Score (PT) 17  -SS 17  -SS    Highest level of mobility 5 --> Static standing  -SS 5 --> Static standing  -SS    Row Name 05/06/22 0845          How much help from another person do you currently need...    Turning from your back to your side while in flat bed without using bedrails? 4  -KB     Moving from lying on back to sitting on the side of a flat bed without bedrails? 3  -KB     Moving to and from a bed to a chair (including a wheelchair)? 3  -KB     Standing up from a chair using your arms (e.g., wheelchair, bedside chair)? 3  -KB     Climbing 3-5 steps with a railing? 3  -KB     To walk in hospital room? 3  -KB     AM-PAC 6 Clicks Score (PT) 19  -KB     Highest level of mobility 6 --> Walked 10 steps or more  -KB     Row Name 05/06/22 1636 05/06/22 1626       Functional Assessment     Outcome Measure Options AM-PAC 6 Clicks Basic Mobility (PT)  -SS AM-PAC 6 Clicks Basic Mobility (PT)  -SS          User Key  (r) = Recorded By, (t) = Taken By, (c) = Cosigned By    Initials Name Provider Type    Margie Gloria, RN Registered Nurse    SS Ludy Perez, [place] Physical Therapist                             Physical Therapy Education                 Title: PT OT SLP Therapies (Done)     Topic: Physical Therapy (Done)     Point: Mobility training (Done)     Learning Progress Summary           Patient Eager, E, VU,NR by  at 5/6/2022 1636    Comment: Reviewed safety/technique with transfers, ambulation, HEP, PT POC    Eager, E, VU,NR by  at 5/6/2022 1626    Comment: Reviewed safety/technique with bed mobility, transfers, ambulation, HEP, KI, WB status, PT POC    Acceptance, E,TB, VU by  at 5/5/2022 1526    Acceptance, E,TB, VU by  at 5/5/2022 0940    Acceptance, E,D, VU by TED at 5/4/2022 1345    Comment: Educated on safe sequencing with bed mobility, ambulatory transfers, and gait. Reviewed HEP and knee precautions.                   Point: Home exercise program (Done)     Learning Progress Summary           Patient Eager, E, VU,NR by  at 5/6/2022 1636    Comment: Reviewed safety/technique with transfers, ambulation, HEP, PT POC    Eager, E, VU,NR by  at 5/6/2022 1626    Comment: Reviewed safety/technique with bed mobility, transfers, ambulation, HEP, KI, WB status, PT POC    Acceptance, E,TB, VU by  at 5/5/2022 1526    Acceptance, E,TB, VU by  at 5/5/2022 0940    Acceptance, E,D, VU by TED at 5/4/2022 1345    Comment: Educated on safe sequencing with bed mobility, ambulatory transfers, and gait. Reviewed HEP and knee precautions.                   Point: Body mechanics (Done)     Learning Progress Summary           Patient Eager, E, VU,NR by  at 5/6/2022 1636    Comment: Reviewed safety/technique with transfers, ambulation, HEP, PT POC    Eager, E, VU,NR by  at 5/6/2022 1626     Comment: Reviewed safety/technique with bed mobility, transfers, ambulation, HEP, KI, WB status, PT POC    Acceptance, E,TB, VU by  at 5/5/2022 1526    Acceptance, E,TB, VU by  at 5/5/2022 0940    Acceptance, E,D, VU by  at 5/4/2022 1345    Comment: Educated on safe sequencing with bed mobility, ambulatory transfers, and gait. Reviewed HEP and knee precautions.                   Point: Precautions (Done)     Learning Progress Summary           Patient Eager, E, VU,NR by  at 5/6/2022 1636    Comment: Reviewed safety/technique with transfers, ambulation, HEP, PT POC    Eager, E, VU,NR by  at 5/6/2022 1626    Comment: Reviewed safety/technique with bed mobility, transfers, ambulation, HEP, KI, WB status, PT POC    Acceptance, E,TB, VU by  at 5/5/2022 1526    Acceptance, E,TB, VU by  at 5/5/2022 0940    Acceptance, E,D, VU by  at 5/4/2022 1345    Comment: Educated on safe sequencing with bed mobility, ambulatory transfers, and gait. Reviewed HEP and knee precautions.                               User Key     Initials Effective Dates Name Provider Type Discipline     06/16/21 -  Lev Harper, PT Physical Therapist PT     09/22/20 -  Reinaldo Bridges, PT Physical Therapist PT     06/01/21 -  Ludy Perez, PT Physical Therapist PT              PT Recommendation and Plan     Plan of Care Reviewed With: patient  Progress: improving  Outcome Evaluation: Pt. perforemd sit to stand transfer w/contact guard assist. She ambulated 200' w/front wheeled walker, contact guard assist. Gait limited by pain, fatigue. Increased effort required. She tolerated ther-ex well. Will continue to progress as able.     Time Calculation:    PT Charges     Row Name 05/06/22 1637 05/06/22 1626          Time Calculation    Start Time 1514  -SS 1043  -SS     Stop Time 1531  -SS 1111  -SS     Time Calculation (min) 17 min  -SS 28 min  -SS     PT Received On 05/06/22  - 05/06/22  -            Time Calculation- PT    Total Timed  Code Minutes- PT 17 minute(s)  -SS 28 minute(s)  -SS            Timed Charges    50982 - PT Therapeutic Exercise Minutes 3  -SS 5  -SS     69380 - Gait Training Minutes  12  -SS 15  -SS     75423 - PT Therapeutic Activity Minutes 2  -SS 8  -SS            Total Minutes    Timed Charges Total Minutes 17  -SS 28  -SS      Total Minutes 17  -SS 28  -SS           User Key  (r) = Recorded By, (t) = Taken By, (c) = Cosigned By    Initials Name Provider Type     Ludy Perez, PT Physical Therapist              Therapy Charges for Today     Code Description Service Date Service Provider Modifiers Qty    49859096894 HC GAIT TRAINING EA 15 MIN 5/6/2022 Ludy Perez, PT GP 1    61692045848 HC PT THERAPEUTIC ACT EA 15 MIN 5/6/2022 Ludy Perez, PT GP 1    97140691729 HC GAIT TRAINING EA 15 MIN 5/6/2022 Ludy Perez, PT GP 1          PT G-Codes  Outcome Measure Options: AM-PAC 6 Clicks Basic Mobility (PT)  AM-PAC 6 Clicks Score (PT): 17    Ludy Perez PT  5/6/2022

## 2022-05-06 NOTE — PROGRESS NOTES
Orthopedic Progress Note      Patient: Corrina Reis  YOB: 1965     Date of Admission: 5/4/2022  7:03 AM Medical Record Number: [id number]     Attending Physician: Rei Sutherland MD    Status Post:  Procedure(s):  IRRIGATION AND DEBRIDMENT, LEFT KNEE ANTIBIOTIC SPACER PLACEMENT Post Operative Day Number: 1    Subjective : No new orthopaedic complaints     Pain Relief: some relief with present medication.     Systemic Complaints: No Complaints    Drain had lost suction pulled this morning with the assistance of nursing reinforce dressing  Vitals:    05/05/22 1508 05/05/22 1908 05/06/22 0406 05/06/22 0731   BP: 114/67 104/62 115/68 127/74   BP Location: Left arm Left arm Left arm Left arm   Patient Position: Lying Lying Lying Lying   Pulse: 81 80 72 67   Resp: 16 18 18 18   Temp: 98.1 °F (36.7 °C) 97.8 °F (36.6 °C) 97.7 °F (36.5 °C) 97.7 °F (36.5 °C)   TempSrc: Oral Oral Oral Oral   SpO2: 91% 92% 98% 98%   Weight:       Height:           Physical Exam: 56 y.o. female    General Appearance:       Alert, cooperative, in no acute distress                  Extremities:    Dressing Clean, Dry and Intact             No clinical sign of DVT        Able to do good movements of digits    Pulses:   Pulses palpable and equal bilaterally           Diagnostic Tests:     Results from last 7 days   Lab Units 05/06/22  0632 05/05/22  0558 05/02/22  1208   WBC 10*3/mm3 6.25 10.00 5.11   HEMOGLOBIN g/dL 9.8* 9.9* 13.0   HEMATOCRIT % 29.4* 28.5* 39.0   PLATELETS 10*3/mm3 171 155 224     Results from last 7 days   Lab Units 05/05/22  0558 05/02/22  1208   SODIUM mmol/L 140 140   POTASSIUM mmol/L 4.5 3.8   CHLORIDE mmol/L 106 103   CO2 mmol/L 23.0 27.0   BUN mg/dL 13 15   CREATININE mg/dL 0.96 1.05*   GLUCOSE mg/dL 97 109*   CALCIUM mg/dL 8.5* 9.3     Results from last 7 days   Lab Units 05/06/22  0632 05/05/22  0558 05/04/22  0855 05/02/22  1208   INR  1.49* 1.43* 1.35* 1.52*   APTT seconds  --   --   --  33.8*      No results found for: URICACID  Lab Results   Component Value Date    CRYSTAL NONE SEEN 04/29/2021     Microbiology Results (last 10 days)     Procedure Component Value - Date/Time    Tissue / Bone Culture - Tissue, Knee, Left [[id number]] Collected: 05/04/22 1114    Lab Status: Preliminary result Specimen: Tissue from Knee, Left Updated: 05/05/22 0855     Tissue Culture No growth     Gram Stain Rare (1+) WBCs seen      No organisms seen    Tissue / Bone Culture - Tissue, Knee, Left [[id number]] Collected: 05/04/22 1114    Lab Status: Preliminary result Specimen: Tissue from Knee, Left Updated: 05/05/22 0855     Tissue Culture No growth     Gram Stain No WBCs or organisms seen    Tissue / Bone Culture - Tissue, Knee, Left [[id number]] Collected: 05/04/22 1113    Lab Status: Preliminary result Specimen: Tissue from Knee, Left Updated: 05/05/22 0853     Tissue Culture No growth     Gram Stain No WBCs or organisms seen    AFB Culture - Tissue, Knee, Left [[id number]] Collected: 05/04/22 1113    Lab Status: Preliminary result Specimen: Tissue from Knee, Left Updated: 05/05/22 1144     AFB Stain No acid fast bacilli seen on concentrated smear    MRSA Screen, PCR (Inpatient) - Swab, Nares [[id number]]  (Normal) Collected: 05/02/22 1208    Lab Status: Final result Specimen: Swab from Nares Updated: 05/02/22 1409     MRSA PCR Negative    Narrative:      MRSA Negative    COVID PRE-OP / PRE-PROCEDURE SCREENING ORDER (NO ISOLATION) - Swab, Nasopharynx [[id number]]  (Normal) Collected: 05/02/22 1204    Lab Status: Final result Specimen: Swab from Nasopharynx Updated: 05/02/22 1809    Narrative:      The following orders were created for panel order COVID PRE-OP / PRE-PROCEDURE SCREENING ORDER (NO ISOLATION) - Swab, Nasopharynx.  Procedure                               Abnormality         Status                     ---------                               -----------         ------                     COVID-19, APTIMA  PANTHER...[[id number]]  Normal              Final result                 Please view results for these tests on the individual orders.    COVID-19, APTIMA PANTHER [facility] IN-HOUSE NP/OP SWAB IN UTM/VTM/SALINE TRANSPORT MEDIA 24HR TAT - Swab, Nasopharynx [[id number]]  (Normal) Collected: 05/02/22 1204    Lab Status: Final result Specimen: Swab from Nasopharynx Updated: 05/02/22 1809     COVID19 Not Detected    Narrative:      Fact sheet for providers: https://www.fda.gov/media/016514/download     Fact sheet for patients: https://www.fda.gov/media/821041/download    Test performed by RT PCR.        XR Knee 1 or 2 View Left    Result Date: 5/4/2022  1. Expected postoperative changes of the left knee.  This report was finalized on 5/4/2022 2:20 PM by Chivo Sommer MD.          Current Medications:  Scheduled Meds:acetaminophen, 1,000 mg, Oral, Q8H  amitriptyline, 50 mg, Oral, Nightly  baclofen, 10 mg, Oral, TID  DAPTOmycin, 6 mg/kg (Adjusted), Intravenous, Q24H  famotidine, 20 mg, Oral, Daily  gabapentin, 300 mg, Oral, TID  levothyroxine, 100 mcg, Oral, Q AM  meloxicam, 15 mg, Oral, Daily  midodrine, 5 mg, Oral, TID AC  sertraline, 100 mg, Oral, Daily  warfarin, 5 mg, Oral, Daily      Continuous Infusions:lactated ringers, 9 mL/hr, Last Rate: Stopped (05/04/22 1306)  lactated ringers, 100 mL/hr, Last Rate: 100 mL/hr (05/05/22 0202)  Pharmacy to dose warfarin,   Ropivacine HCl-NaCl, 10 mL/hr, Last Rate: Stopped (05/05/22 2100)      PRN Meds:.cyclobenzaprine  •  diphenhydrAMINE **OR** diphenhydrAMINE  •  HYDROmorphone **AND** naloxone  •  ketorolac  •  labetalol  •  ondansetron **OR** ondansetron  •  ondansetron  •  oxyCODONE  •  oxyCODONE  •  Pharmacy to dose warfarin  •  sodium chloride  •  traMADol    Assessment: Status post  NV INCIS/DRAIN THIGH/KNEE ABSCESS,DEEP [10067] (IRRIGATION AND DEBRIDMENT LEFT KNEE ANTIBIOTIC SPACER)    Patient Active Problem List   Diagnosis   • Autonomic orthostatic hypotension   • Infection  of left knee (HCC)   • S/P irrigation and debridement, left knee antibiotic spacer placement   • Hyperlipidemia   • Hypothyroid   • History of DVT (deep vein thrombosis)       PLAN:   Continues current post-op course  Anticoagulation: Okay to restart patient's Coumadin  Mobilize with PT as tolerated per protocol  Knee immobilizer for 2 weeks  Incisional wound VAC  Antibiotics per infectious disease  Follow-up or cultures previously grew MRSA    Weight Bearing: WBAT  Discharge Plan: OK to plan for discharge in  today to home  from orthopaedic perspective.    Rei Sutherland MD    Date: 5/6/2022    Time: 09:10 EDT

## 2022-05-06 NOTE — PROGRESS NOTES
INFECTIOUS DISEASE Progress Note    Corrina Reis  1965  [id number]    Consult: 5/5/22  Admit date: 5/4/2022    Requesting Provider: Rei Sutherland MD  Evaluating physician: Reji Ascencio MD  Reason for Consultation: MRSA septic left knee  Chief Complaint: Above      Subjective   History of present illness:  Patient is a very pleasant  56 y.o.  Yr old female with a history of borderline diabetes mellitus, anxiety, DVT in the past, GERD, spinal cord stimulator for chronic pain (2012), who fell in April 2021 with left knee injury and subsequent MRSA infection treated with arthroscopic washout in April 2021.  Cultures reported positive for MRSA in the knee and in blood and she was treated with about 6 weeks of IV vancomycin (until around 6/10/2021 per Dr. Evaristo Figueroa, infectious disease at [facility]), followed by 6 weeks of doxycycline.  The patient had progressive left knee pain with inability to ambulate and was evaluated by Dr. Rei Bee who aspirated left knee with reported fluid showing 147 WBCs, 41% PMNs.  CT scan without evidence of abscess or sequestrum.  The patient was taken to the operating room after admission to [facility] on 5/4/2022, where she was found to have extensive injuries including minimal cartilage, extensive osteophytes.  Antibiotic spacer was placed.  I was consulted on 5/5/2022 for further evaluation and treatment.  No other localizing signs or symptoms of infection.    5/6/2022 history reviewed.  No high fevers or chills.  Still has a little bit of left knee pain.  Tolerating daptomycin until 6/16/2022.  Left knee culture negative to date.  May be looking at transferring to skilled facility.    Past Medical History:   Diagnosis Date   • Anxiety    • Borderline diabetes     daily at home checks   • Deep vein thrombosis (HCC)     left leg 2001 and 2014   • GERD (gastroesophageal reflux disease)    • History of migraine     botox shots  for migraines   • History of pulmonary embolus (PE) 2014   • History of transfusion     autologus blood with back surgery   • Hyperlipidemia    • Hypertension    • IBS (irritable bowel syndrome)    • MRSA infection 04/2021    MRSA of left knee; IV antibioitcs x 4 weeks and then 6 weeks of PO antibiotics   • PONV (postoperative nausea and vomiting)    • Pyogenic arthritis of left hip (HCC) 04/2021   • Thyroid nodule     nodules are monitored       Past Surgical History:   Procedure Laterality Date   • APPENDECTOMY  1978   • BACK SURGERY      Daniel removed   • CHOLECYSTECTOMY  1997   • COLONOSCOPY     • ENDOMETRIAL ABLATION     • HYSTERECTOMY  1998   • SPINAL CORD STIMULATOR IMPLANT  2013   • SPINAL FUSION  1978   • THORACIC OUTLET SURGERY      1994-partial removal of sternum 1995-rib removal   • TOTAL KNEE  PROSTHESIS REMOVAL W/ SPACER INSERTION Left 5/4/2022    Procedure: IRRIGATION AND DEBRIDMENT, LEFT KNEE ANTIBIOTIC SPACER PLACEMENT;  Surgeon: Rei Sutherland MD;  Location: [facility];  Service: Orthopedics;  Laterality: Left;       Pediatric History   Patient Parents   • Not on file     Other Topics Concern   • Not on file   Social History Narrative   • Not on file       family history includes Atrial fibrillation in her mother; Autoimmune disease in her sister; Diabetes in her brother, mother, and sister; Graves' disease in her brother and sister; Heart attack in her brother; Heart disease in her mother; Hypertension in her brother, brother, and sister; Lung cancer in her brother; No Known Problems in her father and sister; Rectal cancer in her brother.    Allergies   Allergen Reactions   • Msg [Monosodium Glutamate] Nausea And Vomiting and Headache     Numbness around mouth and eyes   • Orange Nausea And Vomiting and Headache     Numbness around mouth and eyes   • Penicillins Hives   • Sulfa Antibiotics Hives       Immunization History   Administered Date(s) Administered   • COVID-19 (MODERNA) 1st, 2nd, 3rd Dose  Only 03/19/2021, 05/12/2021       Medication:    Current Facility-Administered Medications:   •  acetaminophen (TYLENOL) tablet 1,000 mg, 1,000 mg, Oral, Q8H, Rei Sutherland MD, 1,000 mg at 05/06/22 0508  •  amitriptyline (ELAVIL) tablet 50 mg, 50 mg, Oral, Nightly, Bryant, Gillian, APRN, 50 mg at 05/05/22 2009  •  baclofen (LIORESAL) tablet 10 mg, 10 mg, Oral, TID, Bryant, Gillian, APRN, 10 mg at 05/06/22 0903  •  cyclobenzaprine (FLEXERIL) tablet 5 mg, 5 mg, Oral, TID PRN, Gillian Hurtado, APRN  •  DAPTOmycin (CUBICIN) 500 mg in sodium chloride 0.9 % 50 mL IVPB, 6 mg/kg (Adjusted), Intravenous, Q24H, Reji Ascencio MD, Last Rate: 100 mL/hr at 05/06/22 1138, 500 mg at 05/06/22 1138  •  diphenhydrAMINE (BENADRYL) capsule 25 mg, 25 mg, Oral, Q6H PRN **OR** diphenhydrAMINE (BENADRYL) injection 25 mg, 25 mg, Intravenous, Q6H PRN, Rei Sutherland MD  •  famotidine (PEPCID) tablet 20 mg, 20 mg, Oral, Daily, Gillian Hurtado, APRN, 20 mg at 05/06/22 0905  •  gabapentin (NEURONTIN) capsule 300 mg, 300 mg, Oral, TID, Zoila Benites MD, 300 mg at 05/06/22 0904  •  HYDROmorphone (DILAUDID) injection 0.5 mg, 0.5 mg, Intravenous, Q2H PRN **AND** naloxone (NARCAN) injection 0.1 mg, 0.1 mg, Intravenous, Q5 Min PRN, Rei Sutherland MD  •  ketorolac (TORADOL) injection 15 mg, 15 mg, Intravenous, Q6H PRN, Rei Sutherland MD, 15 mg at 05/06/22 0520  •  labetalol (NORMODYNE,TRANDATE) injection 10 mg, 10 mg, Intravenous, Q4H PRN, Gillian Hurtado, APRN  •  lactated ringers infusion, 9 mL/hr, Intravenous, Continuous, Rei Sutherland MD, Stopped at 05/04/22 1306  •  lactated ringers infusion, 100 mL/hr, Intravenous, Continuous, Rei Sutherland MD, Last Rate: 100 mL/hr at 05/05/22 0202, 100 mL/hr at 05/05/22 0202  •  levothyroxine (SYNTHROID, LEVOTHROID) tablet 100 mcg, 100 mcg, Oral, Q AM, Gillian Hurtado, APRN, 100 mcg at 05/06/22 0508  •  meloxicam (MOBIC) tablet 15 mg, 15 mg, Oral, Daily, Rei Sutherland MD, 15  mg at 05/06/22 0905  •  midodrine (PROAMATINE) tablet 5 mg, 5 mg, Oral, TID AC, Gillian Hurtado, APRN, 5 mg at 05/06/22 1135  •  ondansetron (ZOFRAN) tablet 4 mg, 4 mg, Oral, Q6H PRN **OR** ondansetron (ZOFRAN) injection 4 mg, 4 mg, Intravenous, Q6H PRN, Rei Sutherland MD  •  ondansetron (ZOFRAN) injection 4 mg, 4 mg, Intravenous, Q6H PRN, Gillian Hurtado, APRN  •  oxyCODONE (ROXICODONE) immediate release tablet 10 mg, 10 mg, Oral, Q4H PRN, Rei Sutherland MD  •  oxyCODONE (ROXICODONE) immediate release tablet 5 mg, 5 mg, Oral, Q4H PRN, Rei Sutherland MD, 5 mg at 05/06/22 1135  •  Pharmacy to dose warfarin, , Does not apply, Continuous PRN, Rei Sutherland MD  •  ropivacaine (Naropin) 0.2% in NS infusion (ARROW Pump), 10 mL/hr, Peripheral Nerve, Continuous, Rei Sutherland MD, Stopped at 05/05/22 2100  •  sertraline (ZOLOFT) tablet 100 mg, 100 mg, Oral, Daily, Gillian Hurtado, APRN, 100 mg at 05/06/22 0904  •  sodium chloride 0.9 % bolus 500 mL, 500 mL, Intravenous, TID PRN, Gillian Hurtado, APRN  •  sodium chloride 0.9 % flush 10 mL, 10 mL, Intravenous, Q12H, Reji Ascencio MD, 10 mL at 05/06/22 1223  •  sodium chloride 0.9 % flush 10 mL, 10 mL, Intravenous, PRN, Reji Ascencio MD  •  traMADol (ULTRAM) tablet 50 mg, 50 mg, Oral, Q8H PRN, Rei Sutherland MD, 50 mg at 05/06/22 0508  •  warfarin (COUMADIN) tablet 5 mg, 5 mg, Oral, Daily, Caitlin Modi PharmD, 5 mg at 05/05/22 1824    Please refer to the medical record for a full medication list    Review of Systems:    Constitutional-- No Fever, chills or sweats.  Appetite good, and no malaise. No fatigue.  HEENT-- No new vision, hearing or throat complaints.  No epistaxis or oral sores.  Denies odynophagia or dysphagia.  No odynophagia or dysphagia. No headache, photophobia or neck stiffness.  CV-- No chest pain, palpitation or syncope  Resp-- No SOB/cough/Hemoptysis  GI- No nausea, vomiting, or diarrhea.  No hematochezia, melena, or hematemesis.  "Denies jaundice or chronic liver disease.  -- No dysuria, hematuria, or flank pain.  Denies hesitancy, urgency or flank pain.  Lymph- no swollen lymph nodes in neck/axilla or groin.   Heme- No active bruising or bleeding; no Hx of DVT or PE.  MS-- no swelling or pain in the bones or joints of arms/legs.  No new back pain.  Except left knee pain.  Neuro-- No acute focal weakness or numbness in the arms or legs.  No seizures.  Skin--No rashes or lesions, no nodules    Physical Exam:   Vital Signs   Temp:  [97.7 °F (36.5 °C)-98.1 °F (36.7 °C)] 97.7 °F (36.5 °C)  Heart Rate:  [67-81] 81  Resp:  [16-18] 18  BP: (104-127)/(62-74) 124/70    Temp  Min: 97.7 °F (36.5 °C)  Max: 98.1 °F (36.7 °C)  BP  Min: 104/62  Max: 127/74  Pulse  Min: 67  Max: 81  Resp  Min: 16  Max: 18  SpO2  Min: 91 %  Max: 98 %    Blood pressure 124/70, pulse 81, temperature 97.7 °F (36.5 °C), temperature source Oral, resp. rate 18, height 172.7 cm (67.99\"), weight 105 kg (231 lb 7.7 oz), SpO2 98 %.  GENERAL: Awake and alert, in moderate distress. Appears older than stated age.  Resting in bed.  HEENT:  Normocephalic, atraumatic.  Oropharynx without thrush. Dentition in good repair. No cervical adenopathy. No neck masses.  Ears externally normal, Nose externally normal.  EYES: PERRL. No conjunctival injection. No icterus. EOM full.  LYMPHATICS: No lymphadenopathy of the neck or axillary or inguinal regions.   HEART: No murmur, gallop, or pericardial friction rub. Reg rate rhythm, No JVD at 45 degrees.  LUNGS: Clear to auscultation and percussion. No respiratory distress, no use of accessory muscles.  No wheezing.  ABDOMEN: Soft, nontender, nondistended. No appreciable HSM.  Bowel sounds normal.  SKIN: Warm and dry without cutaneous eruptions.  No nodules.  Left knee surgical dressing in place, surgical dressing in place without drainage.  PSYCHIATRIC: Mental status lucid. No confusion.  EXT:  No cellulitic change.  Decreased range of motion left " knee.  NEURO: Oriented to name, nonfocal.      Results Review:   I reviewed the patient's new clinical results.  I reviewed the patient's new imaging results and agree with the interpretation.  I reviewed the patient's other test results and agree with the interpretation    Results from last 7 days   Lab Units 05/06/22  0632 05/05/22  0558 05/02/22  1208   WBC 10*3/mm3 6.25 10.00 5.11   HEMOGLOBIN g/dL 9.8* 9.9* 13.0   HEMATOCRIT % 29.4* 28.5* 39.0   PLATELETS 10*3/mm3 171 155 224     Results from last 7 days   Lab Units 05/06/22  0858   SODIUM mmol/L 144   POTASSIUM mmol/L 4.1   CHLORIDE mmol/L 109*   CO2 mmol/L 27.0   BUN mg/dL 14   CREATININE mg/dL 1.03*   GLUCOSE mg/dL 139*   CALCIUM mg/dL 8.8     Results from last 7 days   Lab Units 05/06/22  0858   ALK PHOS U/L 77   BILIRUBIN mg/dL 0.5   ALT (SGPT) U/L 63*   AST (SGOT) U/L 118*     Results from last 7 days   Lab Units 05/06/22  0632   SED RATE mm/hr 10     Results from last 7 days   Lab Units 05/06/22  0858   CRP mg/dL 5.02*    on 5/6/2022, 306 on 5/5 prior to daptomycin.          Estimated Creatinine Clearance: 75.5 mL/min (A) (by C-G formula based on SCr of 1.03 mg/dL (H)).    Microbiology:  Microbiology Results Abnormal     Procedure Component Value - Date/Time    Tissue / Bone Culture - Tissue, Knee, Left [[id number]] Collected: 05/04/22 1114    Lab Status: Preliminary result Specimen: Tissue from Knee, Left Updated: 05/06/22 0937     Tissue Culture No growth at 2 days     Gram Stain No WBCs or organisms seen    Tissue / Bone Culture - Tissue, Knee, Left [[id number]] Collected: 05/04/22 1114    Lab Status: Preliminary result Specimen: Tissue from Knee, Left Updated: 05/06/22 0937     Tissue Culture No growth at 2 days     Gram Stain Rare (1+) WBCs seen      No organisms seen    Tissue / Bone Culture - Tissue, Knee, Left [[id number]] Collected: 05/04/22 1113    Lab Status: Preliminary result Specimen: Tissue from Knee, Left Updated: 05/06/22 0963      Tissue Culture No growth at 2 days     Gram Stain No WBCs or organisms seen    AFB Culture - Tissue, Knee, Left [[id number]] Collected: 05/04/22 1113    Lab Status: Preliminary result Specimen: Tissue from Knee, Left Updated: 05/05/22 1144     AFB Stain No acid fast bacilli seen on concentrated smear          Radiology:  Imaging Results (Last 72 Hours)     Procedure Component Value Units Date/Time    XR Knee 1 or 2 View Left [[id number]] Collected: 05/04/22 1416     Updated: 05/04/22 1424    Narrative:      EXAMINATION: XR KNEE 1 OR 2 VW LEFT-     DATE OF EXAM: 5/4/2022 1:57 PM     INDICATION: Post-Op Knee Arthoplasty; M00.9-Pyogenic arthritis,  unspecified.     COMPARISON: None available     TECHNIQUE: One or Two views of the left knee were obtained.     FINDINGS:  There is a radiolucent spacer at the tibial plateau. The distal femoral  component appears in expected alignment. There are intramedullary rods  with surrounding cement present within the distal femur and proximal  tibia. There are cutaneous staples present at the midline.       Impression:      1. Expected postoperative changes of the left knee.     This report was finalized on 5/4/2022 2:20 PM by Chivo Sommer MD.             IMPRESSION:     1.  Left knee MRSA infection, previous I&D April 2021 and treated, along with treated MRSA sepsis.  Data deficit.  Extensive injury to left knee resulting in the need for eventual prosthetic knee replacement.  Debrided on 5/4/2022 with antibiotic spacer placed.  Cultures negative to date.  MRSA swab negative.  2.  Anemia, related to acute postoperative blood loss and chronic disease.  Worse.  3.  Hypocalcemia.  Resolved.  4.  Prediabetes.  5.  History of GERD, DVT, chronic pain with spinal cord stimulator.  6.  Previous history of MRSA sepsis and left knee septic arthritis April 2021.  7.  Mild CPK elevation prior to daptomycin, worse.  Likely from surgery.    Plan:    1.  Diagnostically, continue to follow  patient's physical exam, CBC, CMP, CRP, CPK, cultures obtained from surgery on 5/4/2022.  2.  Therapeutically, continue daptomycin 6 mg/kg IV daily for 6 weeks until approximately 6/16/2022 and reassess.  Hold atorvastatin while on daptomycin.  We will arrange for this as outpatient therapy.  3.  Supportive care.    I discussed the patient's findings and my recommendations with patient and nursing staff    Thank you for asking me to see Corrina Reis.  Our group would be pleased to follow this patient over the course of their hospitalization and assist with outpatient antimicrobial therapy, as indicated.  Further recommendations depend on the results of the cultures and clinical course.  Side effects of antibiotics discussed.    Case management orders: Please arrange for home antibiotics with [facility] with daptomycin 500 mg IV daily until 6/16/2022.  Check CBC, CMP, CRP, CPK weekly while on IV antibiotics.  Fax orders to [phone], call [phone] with final arrangements. Arrange for follow-up with me by telehealth in 1 week, and in person in 2 weeks.    Reji Ascencio MD  5/6/2022

## 2022-05-06 NOTE — PROGRESS NOTES
"IM progress note      Corrina Reis  [id number]  1965     LOS: 2 days     Attending: Zoila Benites MD    Primary Care Provider: Abbey Merrill MD      Chief Complaint/Reason for visit:  No chief complaint on file.      Subjective   Doing fairly well. No c/o f/c/n/vom/sob. Pain control is improving.   Noting gait limitation by pain and fatigue and increased effort required while working with PT.    Objective     Visit Vitals  /70   Pulse 81   Temp 97.7 °F (36.5 °C) (Oral)   Resp 18   Ht 172.7 cm (67.99\")   Wt 105 kg (231 lb 7.7 oz)   SpO2 98%   BMI 35.21 kg/m²     Temp (24hrs), Av.8 °F (36.6 °C), Min:97.7 °F (36.5 °C), Max:98.1 °F (36.7 °C)      Intake/Output:    Intake/Output Summary (Last 24 hours) at 2022 1208  Last data filed at 2022 1100  Gross per 24 hour   Intake 480 ml   Output 2850 ml   Net -2370 ml        Physical Therapy:   Progress: improving  Outcome Evaluation: Pt. grahamkimberly sit to stand transfer w/contact guard assist. She ambulated 200' w/front wheeled walker, contact guard assist. Gait limited by pain, fatigue. Increased effort required. She tolerated ther-ex well. Will continue to progress as able.    Physical Exam:     General Appearance:    Alert, cooperative, in no acute distress   Head:    Normocephalic, without obvious abnormality, atraumatic    Lungs:     Normal effort, symmetric chest rise,  clear to      auscultation bilaterally              Heart:    Regular rhythm and normal rate, normal S1 and S2    Abdomen:     Normal bowel sounds, no masses, no organomegaly, soft        non-tender, non-distended, no guarding, no rebound                tenderness   Extremities:  Clean dry and intact dressing.  Lianna suction dressing.  Drain to Hemovac(out today), peripheral nerve block catheter.  Knee immobilizer on.  Intact flexion, dorsiflexion bilateral feet.  No clubbing, cyanosis or edema.  No deformities.    Pulses:   Pulses palpable and equal " bilaterally   Skin:   No bleeding, bruising or rash          Results Review:     I reviewed the patient's new clinical results.   Results from last 7 days   Lab Units 05/06/22  0632 05/05/22  0558 05/02/22  1208   WBC 10*3/mm3 6.25 10.00 5.11   HEMOGLOBIN g/dL 9.8* 9.9* 13.0   HEMATOCRIT % 29.4* 28.5* 39.0   PLATELETS 10*3/mm3 171 155 224     Results from last 7 days   Lab Units 05/06/22  0858 05/05/22  0558 05/02/22  1208   SODIUM mmol/L 144 140 140   POTASSIUM mmol/L 4.1 4.5 3.8   CHLORIDE mmol/L 109* 106 103   CO2 mmol/L 27.0 23.0 27.0   BUN mg/dL 14 13 15   CREATININE mg/dL 1.03* 0.96 1.05*   CALCIUM mg/dL 8.8 8.5* 9.3   BILIRUBIN mg/dL 0.5  --  0.6   ALK PHOS U/L 77  --  79   ALT (SGPT) U/L 63*  --  21   AST (SGOT) U/L 118*  --  26   GLUCOSE mg/dL 139* 97 109*     I reviewed the patient's new imaging including images and reports.    All medications reviewed.   acetaminophen, 1,000 mg, Oral, Q8H  amitriptyline, 50 mg, Oral, Nightly  baclofen, 10 mg, Oral, TID  DAPTOmycin, 6 mg/kg (Adjusted), Intravenous, Q24H  famotidine, 20 mg, Oral, Daily  gabapentin, 300 mg, Oral, TID  levothyroxine, 100 mcg, Oral, Q AM  meloxicam, 15 mg, Oral, Daily  midodrine, 5 mg, Oral, TID AC  sertraline, 100 mg, Oral, Daily  sodium chloride, 10 mL, Intravenous, Q12H  warfarin, 5 mg, Oral, Daily      cyclobenzaprine, 5 mg, TID PRN  diphenhydrAMINE, 25 mg, Q6H PRN   Or  diphenhydrAMINE, 25 mg, Q6H PRN  HYDROmorphone, 0.5 mg, Q2H PRN   And  naloxone, 0.1 mg, Q5 Min PRN  ketorolac, 15 mg, Q6H PRN  labetalol, 10 mg, Q4H PRN  ondansetron, 4 mg, Q6H PRN   Or  ondansetron, 4 mg, Q6H PRN  ondansetron, 4 mg, Q6H PRN  oxyCODONE, 10 mg, Q4H PRN  oxyCODONE, 5 mg, Q4H PRN  Pharmacy to dose warfarin, , Continuous PRN  sodium chloride, 500 mL, TID PRN  sodium chloride, 10 mL, PRN  traMADol, 50 mg, Q8H PRN        Assessment/Plan       S/P irrigation and debridement, left knee antibiotic spacer placement    Autonomic orthostatic hypotension    Infection  of left knee (HCC)    Hyperlipidemia    Hypothyroid    History of DVT (deep vein thrombosis)    Chronic anticoagulation    Acute blood loss anemia, mild, asymptomatic  Status post PICC placement 5/6/2022       Plan   1. PT/OT- Knee immobilizer for first 2 weeks no knee flexion for the first 2 weeks  2. Pain control-prns , PNB cath, ropivacaine.    3. IS-encourage  4. DVT proph- Mechs/coumadin  5. Bowel regimen  6. Resume home medications as appropriate  7. Monitor post-op labs  8. DC planning , patient is interested in rehab facility.   following, referrals made.     Dr. Ascencio with [facility] following for antibiotics.  Follow op cultures, consult is pending.     Hypotension, Hyperlipidemia  - Continue home statin, and midodrine   -Hold HCTZ for now  - Monitor BP   - Holding parameters for BP meds  - Labetalol PRN for SBP>170     Hypothyroid  -Continue home Synthroid       Zoila Benites MD  05/06/22  12:08 EDT

## 2022-05-06 NOTE — PROGRESS NOTES
"Pharmacy Consult  -  Warfarin    Corrina Reis is a  56 y.o. female   Height - 172.7 cm (67.99\")  Weight - 105 kg (231 lb 7.7 oz)    Consulting Provider: Ortho  Indication: History of DVT/PE  Goal INR: 2.5-3.5  Home Regimen: 3 mg daily     Bridge Therapy: No    Drug-Drug Interactions with current regimen:     Meloxicam - may increase risk of bleeding      Tramadol - may increase risk of bleeding    Warfarin Dosing During Admission:    Date  5/4 5/5 5/6         INR  1.35 1.43 1.49         Dose  3 mg 5 mg (6 mg)            Education Provided:  Warfarin education provided on 5/5 verbally and in writing.  Discussed effects of warfarin, importance of checking INR, drug-drug and drug-food interactions, and signs/symptoms of bleeding and clotting.  Patient verbalized understanding through teach back. Patient notes that she monitors her INR at home every 2 weeks with a goal INR range of 2.5-3.5. All pertinent questions were answered.      Discharge Follow up:   Following Provider - Dr. Abbey Merrill (PCP)   Follow up time range or appointment - 2-3 days after discharge    Labs:    Results from last 7 days   Lab Units 05/06/22  0632 05/05/22  0558 05/04/22  0855 05/02/22  1208   INR  1.49* 1.43* 1.35* 1.52*   APTT seconds  --   --   --  33.8*   HEMOGLOBIN g/dL 9.8* 9.9*  --  13.0   HEMATOCRIT % 29.4* 28.5*  --  39.0     Results from last 7 days   Lab Units 05/06/22  0858 05/05/22  0558 05/02/22  1208   SODIUM mmol/L 144 140 140   POTASSIUM mmol/L 4.1 4.5 3.8   CHLORIDE mmol/L 109* 106 103   CO2 mmol/L 27.0 23.0 27.0   BUN mg/dL 14 13 15   CREATININE mg/dL 1.03* 0.96 1.05*   CALCIUM mg/dL 8.8 8.5* 9.3   BILIRUBIN mg/dL 0.5  --  0.6   ALK PHOS U/L 77  --  79   ALT (SGPT) U/L 63*  --  21   AST (SGOT) U/L 118*  --  26   GLUCOSE mg/dL 139* 97 109*     Current dietary intake: % of meals documented in last 24 hours    Diet Order   Procedures    Diet Regular     Assessment/Plan:     Patient's INR is subtherapeutic at " 1.49 today (slight increase from 1.43 yesterday).   Goal INR 2.5-3.5. Patient receiving warfarin for hx of 2 DVTs and a PE.  Give another boosted dose of warfarin 6 mg today.   Discharge recommendations: take increased dose of warfarin 6 mg today, resume home regimen of warfarin 3 mg daily starting tomorrow. Follow with PCP for INR recheck in 2-3 days after discharge.  Daily PT/INR ordered.  Monitor signs/symptoms of bleeding, dietary intake, and drug-drug interactions. Make dose adjustments as necessary.  Pharmacy will continue to follow.    Thank you,    Caitlin Modi, PharmD  PGY1 Resident  5/6/2022  11:05 EDT

## 2022-05-06 NOTE — CASE MANAGEMENT/SOCIAL WORK
"Discharge Planning Assessment  [facility]     Patient Name: Corrina Reis  MRN: [id number]  Today's Date: 5/6/2022    Admit Date: 5/4/2022     Discharge Needs Assessment     Row Name 05/06/22 1438       Living Environment    People in Home spouse    Name(s) of People in Home Renny Reis,  [phone]    Current Living Arrangements home    Family Caregiver if Needed spouse    Quality of Family Relationships unable to assess       Resource/Environmental Concerns    Resource/Environmental Concerns none    Transportation Concerns none       Transition Planning    Patient/Family Anticipates Transition to inpatient rehabilitation facility    Patient/Family Anticipated Services at Transition skilled nursing    Transportation Anticipated family or friend will provide       Discharge Needs Assessment    Readmission Within the Last 30 Days no previous admission in last 30 days    Equipment Currently Used at Home walker, rolling;wheelchair;bath bench    Equipment Needed After Discharge --  Defer to facility    Discharge Facility/Level of Care Needs nursing facility, skilled    Provided Post Acute Provider List? Yes               Discharge Plan     Row Name 05/06/22 1448       Plan    Plan SNF rehab with IV abx    Patient/Family in Agreement with Plan yes    Plan Comments Met with Ms. Reis at the bedside for discharge planning.   Ms. Reis lives with her , Renny, in [facility].  She has had previous knee surgery and IV abx.  She has been evaluated by PT and \"KI donned for all mobility. Pt improved forward ambulation distance to 100 feet with RW and CGA.\"    The patient will be on IV abx through June 16th.  Ms. Reis states that with her previous admission, she transferred to [facility], for IV abx infusion and inpatient rehab.  She states that her  cannot assist her as \"he works\", and no family locally.    Ms. Reis requested referrals to the following:  [facility]- [facility], " [facility]- reviewing, [facility]- reviewing.  A prior auth with Ms. Reis's [place] insurance will be required for the rehab admission.    CM will follow up.    Final Discharge Disposition Code 03 - skilled nursing facility (SNF)              Continued Care and Services - Admitted Since 5/4/2022     Destination     Service Provider Request Status Selected Services Address Phone Fax Patient Preferred    [facility]  Pending - No [facility] Sent N[facility]A [address] [facility] [id number] [phone] --    [facility]  Pending - No [facility] N[facility] [address] [facility] [address] [phone] --    [facility]  Pending - No Request Sent N/A [address] [facility] [address] [phone] --              Expected Discharge Date and Time     Expected Discharge Date Expected Discharge Time    May 16, 2022          Demographic Summary     Row Name 05/06/22 1435       General Information    Admission Type inpatient    Arrived From home    Reason for Consult discharge planning    General Information Comments PCP:  Abbey Merrill       Contact Information    Permission Granted to Share Info With                Functional Status     Row Name 05/06/22 1437       Functional Status    Usual Activity Tolerance moderate    Current Activity Tolerance --  See PT notes.       Functional Status, IADL    Medications independent    Meal Preparation independent    Housekeeping independent    Laundry independent    Shopping independent       Mental Status    General Appearance WDL WDL              Elli Aguilera RN

## 2022-05-06 NOTE — PLAN OF CARE
Goal Outcome Evaluation:  Plan of Care Reviewed With: patient        Progress: improving  Outcome Evaluation: Pt. performed bed mobility with min assist and sit to stand transfer w/contact guard assist. She ambulated 100' w/front wheeled walker, contact guard assist. Gait limited by pain. She tolerated ther-ex well. Will continue to progress as able.

## 2022-05-07 LAB
ALBUMIN SERPL-MCNC: 3.3 G/DL (ref 3.5–5.2)
ALBUMIN/GLOB SERPL: 1.3 G/DL
ALP SERPL-CCNC: 121 U/L (ref 39–117)
ALT SERPL W P-5'-P-CCNC: 134 U/L (ref 1–33)
ANION GAP SERPL CALCULATED.3IONS-SCNC: 10 MMOL/L (ref 5–15)
AST SERPL-CCNC: 162 U/L (ref 1–32)
BACTERIA SPEC AEROBE CULT: NORMAL
BASOPHILS # BLD AUTO: 0.05 10*3/MM3 (ref 0–0.2)
BASOPHILS NFR BLD AUTO: 0.8 % (ref 0–1.5)
BILIRUB SERPL-MCNC: 0.6 MG/DL (ref 0–1.2)
BUN SERPL-MCNC: 13 MG/DL (ref 6–20)
BUN/CREAT SERPL: 13.8 (ref 7–25)
CALCIUM SPEC-SCNC: 8.8 MG/DL (ref 8.6–10.5)
CHLORIDE SERPL-SCNC: 103 MMOL/L (ref 98–107)
CK SERPL-CCNC: 451 U/L (ref 20–180)
CO2 SERPL-SCNC: 24 MMOL/L (ref 22–29)
CREAT SERPL-MCNC: 0.94 MG/DL (ref 0.57–1)
DEPRECATED RDW RBC AUTO: 42.3 FL (ref 37–54)
EGFRCR SERPLBLD CKD-EPI 2021: 70.5 ML/MIN/1.73
EOSINOPHIL # BLD AUTO: 0.18 10*3/MM3 (ref 0–0.4)
EOSINOPHIL NFR BLD AUTO: 2.8 % (ref 0.3–6.2)
ERYTHROCYTE [DISTWIDTH] IN BLOOD BY AUTOMATED COUNT: 12.8 % (ref 12.3–15.4)
GLOBULIN UR ELPH-MCNC: 2.6 GM/DL
GLUCOSE SERPL-MCNC: 109 MG/DL (ref 65–99)
GRAM STN SPEC: NORMAL
HCT VFR BLD AUTO: 29.8 % (ref 34–46.6)
HGB BLD-MCNC: 10.5 G/DL (ref 12–15.9)
IMM GRANULOCYTES # BLD AUTO: 0.01 10*3/MM3 (ref 0–0.05)
IMM GRANULOCYTES NFR BLD AUTO: 0.2 % (ref 0–0.5)
INR PPP: 1.68 (ref 0.84–1.13)
LYMPHOCYTES # BLD AUTO: 1.76 10*3/MM3 (ref 0.7–3.1)
LYMPHOCYTES NFR BLD AUTO: 27.7 % (ref 19.6–45.3)
MCH RBC QN AUTO: 32.4 PG (ref 26.6–33)
MCHC RBC AUTO-ENTMCNC: 35.2 G/DL (ref 31.5–35.7)
MCV RBC AUTO: 92 FL (ref 79–97)
MONOCYTES # BLD AUTO: 0.42 10*3/MM3 (ref 0.1–0.9)
MONOCYTES NFR BLD AUTO: 6.6 % (ref 5–12)
NEUTROPHILS NFR BLD AUTO: 3.94 10*3/MM3 (ref 1.7–7)
NEUTROPHILS NFR BLD AUTO: 61.9 % (ref 42.7–76)
NRBC BLD AUTO-RTO: 0 /100 WBC (ref 0–0.2)
PLATELET # BLD AUTO: 182 10*3/MM3 (ref 140–450)
PMV BLD AUTO: 10.6 FL (ref 6–12)
POTASSIUM SERPL-SCNC: 3.9 MMOL/L (ref 3.5–5.2)
PROT SERPL-MCNC: 5.9 G/DL (ref 6–8.5)
PROTHROMBIN TIME: 19.8 SECONDS (ref 11.4–14.4)
RBC # BLD AUTO: 3.24 10*6/MM3 (ref 3.77–5.28)
SODIUM SERPL-SCNC: 137 MMOL/L (ref 136–145)
WBC NRBC COR # BLD: 6.36 10*3/MM3 (ref 3.4–10.8)

## 2022-05-07 PROCEDURE — 85610 PROTHROMBIN TIME: CPT

## 2022-05-07 PROCEDURE — 82550 ASSAY OF CK (CPK): CPT | Performed by: INTERNAL MEDICINE

## 2022-05-07 PROCEDURE — 97110 THERAPEUTIC EXERCISES: CPT

## 2022-05-07 PROCEDURE — 25010000002 DAPTOMYCIN PER 1 MG: Performed by: INTERNAL MEDICINE

## 2022-05-07 PROCEDURE — 80053 COMPREHEN METABOLIC PANEL: CPT | Performed by: INTERNAL MEDICINE

## 2022-05-07 PROCEDURE — 97116 GAIT TRAINING THERAPY: CPT

## 2022-05-07 PROCEDURE — 85025 COMPLETE CBC W/AUTO DIFF WBC: CPT | Performed by: INTERNAL MEDICINE

## 2022-05-07 RX ORDER — POLYETHYLENE GLYCOL 3350 17 G/17G
17 POWDER, FOR SOLUTION ORAL DAILY
Status: DISCONTINUED | OUTPATIENT
Start: 2022-05-07 | End: 2022-05-10 | Stop reason: HOSPADM

## 2022-05-07 RX ORDER — BISACODYL 10 MG
10 SUPPOSITORY, RECTAL RECTAL DAILY PRN
Status: DISCONTINUED | OUTPATIENT
Start: 2022-05-07 | End: 2022-05-10 | Stop reason: HOSPADM

## 2022-05-07 RX ADMIN — MIDODRINE HYDROCHLORIDE 5 MG: 5 TABLET ORAL at 08:47

## 2022-05-07 RX ADMIN — SERTRALINE 100 MG: 100 TABLET, FILM COATED ORAL at 08:47

## 2022-05-07 RX ADMIN — DAPTOMYCIN 500 MG: 500 INJECTION, POWDER, LYOPHILIZED, FOR SOLUTION INTRAVENOUS at 11:21

## 2022-05-07 RX ADMIN — GABAPENTIN 300 MG: 300 CAPSULE ORAL at 20:58

## 2022-05-07 RX ADMIN — WARFARIN SODIUM 5 MG: 5 TABLET ORAL at 17:23

## 2022-05-07 RX ADMIN — GABAPENTIN 300 MG: 300 CAPSULE ORAL at 08:47

## 2022-05-07 RX ADMIN — Medication 10 ML: at 20:58

## 2022-05-07 RX ADMIN — BACLOFEN 10 MG: 10 TABLET ORAL at 20:58

## 2022-05-07 RX ADMIN — ACETAMINOPHEN 1000 MG: 500 TABLET ORAL at 05:06

## 2022-05-07 RX ADMIN — BACLOFEN 10 MG: 10 TABLET ORAL at 08:47

## 2022-05-07 RX ADMIN — ACETAMINOPHEN 1000 MG: 500 TABLET ORAL at 13:33

## 2022-05-07 RX ADMIN — AMITRIPTYLINE HYDROCHLORIDE 50 MG: 50 TABLET, FILM COATED ORAL at 20:58

## 2022-05-07 RX ADMIN — ACETAMINOPHEN 1000 MG: 500 TABLET ORAL at 20:58

## 2022-05-07 RX ADMIN — MIDODRINE HYDROCHLORIDE 5 MG: 5 TABLET ORAL at 11:20

## 2022-05-07 RX ADMIN — LEVOTHYROXINE SODIUM 100 MCG: 0.1 TABLET ORAL at 05:11

## 2022-05-07 RX ADMIN — FAMOTIDINE 20 MG: 20 TABLET ORAL at 08:47

## 2022-05-07 RX ADMIN — MELOXICAM 15 MG: 7.5 TABLET ORAL at 08:47

## 2022-05-07 RX ADMIN — POLYETHYLENE GLYCOL 3350 17 G: 17 POWDER, FOR SOLUTION ORAL at 11:21

## 2022-05-07 RX ADMIN — GABAPENTIN 300 MG: 300 CAPSULE ORAL at 17:23

## 2022-05-07 RX ADMIN — MIDODRINE HYDROCHLORIDE 5 MG: 5 TABLET ORAL at 17:23

## 2022-05-07 RX ADMIN — BACLOFEN 10 MG: 10 TABLET ORAL at 17:23

## 2022-05-07 NOTE — THERAPY TREATMENT NOTE
Patient Name: Corrina Reis  : 1965    MRN: [id number]                              Today's Date: 2022       Admit Date: 2022    Visit Dx:     ICD-10-CM ICD-9-CM   1. S/P irrigation and debridement, left knee antibiotic spacer placement  Z98.890 V45.89   2. Infection of left knee (HCC)  M00.9 686.9     Patient Active Problem List   Diagnosis   • Autonomic orthostatic hypotension   • Infection of left knee (HCC)   • S/P irrigation and debridement, left knee antibiotic spacer placement   • Hyperlipidemia   • Hypothyroid   • History of DVT (deep vein thrombosis)   • Chronic anticoagulation     Past Medical History:   Diagnosis Date   • Anxiety    • Borderline diabetes     daily at home checks   • Deep vein thrombosis (HCC)     left leg  and    • GERD (gastroesophageal reflux disease)    • History of migraine     botox shots for migraines   • History of pulmonary embolus (PE)    • History of transfusion     autologus blood with back surgery   • Hyperlipidemia    • Hypertension    • IBS (irritable bowel syndrome)    • MRSA infection 2021    MRSA of left knee; IV antibioitcs x 4 weeks and then 6 weeks of PO antibiotics   • PONV (postoperative nausea and vomiting)    • Pyogenic arthritis of left hip (HCC) 2021   • Thyroid nodule     nodules are monitored     Past Surgical History:   Procedure Laterality Date   • APPENDECTOMY     • BACK SURGERY      Daniel removed   • CHOLECYSTECTOMY     • COLONOSCOPY     • ENDOMETRIAL ABLATION     • HYSTERECTOMY     • SPINAL CORD STIMULATOR IMPLANT     • SPINAL FUSION     • THORACIC OUTLET SURGERY      -partial removal of sternum -rib removal   • TOTAL KNEE  PROSTHESIS REMOVAL W/ SPACER INSERTION Left 2022    Procedure: IRRIGATION AND DEBRIDMENT, LEFT KNEE ANTIBIOTIC SPACER PLACEMENT;  Surgeon: Rei Sutherland MD;  Location: [facility];  Service: Orthopedics;  Laterality: Left;      General Information     Row Name  05/07/22 0818          Physical Therapy Time and Intention    Document Type therapy note (daily note)  -AS     Mode of Treatment physical therapy  -AS     Row Name 05/07/22 0818          General Information    Patient Profile Reviewed yes  -AS     Existing Precautions/Restrictions hip;brace worn when out of bed;other (see comments)  hemovac, femoral nerve catheter, K.I. on at all times  -AS     Barriers to Rehab medically complex  -AS     Row Name 05/07/22 0818          Cognition    Orientation Status (Cognition) oriented x 4  -AS     Row Name 05/07/22 0818          Safety Issues, Functional Mobility    Safety Issues Affecting Function (Mobility) positioning of assistive device;safety precaution awareness;safety precautions follow-through/compliance;sequencing abilities  -AS     Impairments Affecting Function (Mobility) endurance/activity tolerance;strength;pain;range of motion (ROM);balance;postural/trunk control  -AS     Comment, Safety Issues/Impairments (Mobility) alert and following commands  -AS           User Key  (r) = Recorded By, (t) = Taken By, (c) = Cosigned By    Initials Name Provider Type    AS Tyra Wynn PTA Physical Therapist Assistant               Mobility     Row Name 05/07/22 0819          Bed Mobility    Supine-Sit [place] (Bed Mobility) supervision  -AS     Sit-Supine [place] (Bed Mobility) not tested  -AS     Assistive Device (Bed Mobility) head of bed elevated  -AS     Comment, (Bed Mobility) patient demonstrated safe technique  -AS     Row Name 05/07/22 0819          Transfers    Comment, (Transfers) verbla cues to step L LE out when sitting on commode, no cues when going to recliner  -AS     Row Name 05/07/22 0819          Bed-Chair Transfer    Bed-Chair [place] (Transfers) verbal cues;contact guard;1 person assist  -AS     Assistive Device (Bed-Chair Transfers) walker, front-wheeled  -AS     Row Name 05/07/22 0819          Sit-Stand Transfer    Sit-Stand  [place] (Transfers) verbal cues;contact guard;1 person assist  -AS     Assistive Device (Sit-Stand Transfers) walker, front-wheeled  -AS     Row Name 05/07/22 0819          Gait/Stairs (Locomotion)    [place] Level (Gait) verbal cues;contact guard;1 person assist  -AS     Assistive Device (Gait) walker, front-wheeled  -AS     Distance in Feet (Gait) 350  -AS     Deviations/Abnormal Patterns (Gait) bilateral deviations;kelsey decreased;gait speed decreased;stride length decreased  -AS     Bilateral Gait Deviations forward flexed posture  -AS     Left Sided Gait Deviations weight shift ability decreased;heel strike decreased  -AS     Comment, (Gait/Stairs) patient ambulated 350 feet with CGA x1 and rolling walker for suppport, cues to keep walker close and to improve posture. Patient able to increased gait distance this session without increase in pain.  -AS     Row Name 05/07/22 0819          Mobility    Extremity Weight-bearing Status left lower extremity  -AS     Left Lower Extremity (Weight-bearing Status) weight-bearing as tolerated (WBAT)  w/knee immobilizer  -AS           User Key  (r) = Recorded By, (t) = Taken By, (c) = Cosigned By    Initials Name Provider Type    AS Tyra Wynn PTA Physical Therapist Assistant               Obj/Interventions     Row Name 05/07/22 0822          Motor Skills    Therapeutic Exercise knee  -AS     [facility] Name 05/07/22 0822          Knee (Therapeutic Exercise)    Knee (Therapeutic Exercise) isometric exercises  -AS     Knee Isometrics (Therapeutic Exercise) bilateral;gluteal sets;quad sets;supine;10 repetitions;3 second hold  -AS     Knee Strengthening (Therapeutic Exercise) left;SLR (straight leg raise);10 repetitions  -AS     Row Name 05/07/22 0822          Ankle (Therapeutic Exercise)    Ankle (Therapeutic Exercise) AROM (active range of motion)  -AS     Ankle AROM (Therapeutic Exercise) bilateral;dorsiflexion;plantarflexion;supine;10 repetitions  -AS     [facility]  Name 05/07/22 0822          Balance    Dynamic Standing Balance verbal cues;contact guard;1-person assist  -AS     Position/Device Used, Standing Balance walker, front-wheeled  -AS           User Key  (r) = Recorded By, (t) = Taken By, (c) = Cosigned By    Initials Name Provider Type    AS Tyra Wynn PTA Physical Therapist Assistant               Goals/Plan    No documentation.                Clinical Impression     Row Name 05/07/22 0822          Pain    Pretreatment Pain Rating 4/10  -AS     Posttreatment Pain Rating 4/10  -AS     Pain Location - Side/Orientation Left  -AS     Pain Location - knee  -AS     Pain Intervention(s) Repositioned;Ambulation/increased activity  -AS     Row Name 05/07/22 0822          Plan of Care Review    Plan of Care Reviewed With patient  -AS     Progress improving  -AS     Outcome Evaluation patient ambulated 350 feet with CGA x1 and rolling walker for suppport, cues to keep walker close and to improve posture. Patient able to increased gait distance this session without increase in pain. Completed isometric exercises and SLR on L LE.  -AS     Row Name 05/07/22 0822          Positioning and Restraints    Pre-Treatment Position in bed  -AS     Post Treatment Position chair  -AS     In Chair reclined;call light within reach;encouraged to call for assist;exit alarm on;waffle cushion;legs elevated;with brace  -AS           User Key  (r) = Recorded By, (t) = Taken By, (c) = Cosigned By    Initials Name Provider Type    AS Tyra Wynn PTA Physical Therapist Assistant               Outcome Measures     Row Name 05/07/22 0823          How much help from another person do you currently need...    Turning from your back to your side while in flat bed without using bedrails? 4  -AS     Moving from lying on back to sitting on the side of a flat bed without bedrails? 4  -AS     Moving to and from a bed to a chair (including a wheelchair)? 3  -AS     Standing up from a chair  using your arms (e.g., wheelchair, bedside chair)? 3  -AS     Climbing 3-5 steps with a railing? 2  -AS     To walk in hospital room? 3  -AS     AM-PAC 6 Clicks Score (PT) 19  -AS     Highest level of mobility 6 --> Walked 10 steps or more  -AS     Row Name 05/07/22 0823          Functional Assessment    Outcome Measure Options AM-PAC 6 Clicks Basic Mobility (PT)  -AS           User Key  (r) = Recorded By, (t) = Taken By, (c) = Cosigned By    Initials Name Provider Type    AS Tyra Wynn PTA Physical Therapist Assistant                             Physical Therapy Education                 Title: PT OT SLP Therapies (In Progress)     Topic: Physical Therapy (In Progress)     Point: Mobility training (In Progress)     Learning Progress Summary           Patient Acceptance, E, NR by AS at 5/7/2022 0823    Eager, E, VU,NR by  at 5/6/2022 1636    Comment: Reviewed safety/technique with transfers, ambulation, HEP, PT POC    Eager, E, VU,NR by  at 5/6/2022 1626    Comment: Reviewed safety/technique with bed mobility, transfers, ambulation, HEP, KI, WB status, PT POC    Acceptance, E,TB, VU by  at 5/5/2022 1526    Acceptance, E,TB, VU by  at 5/5/2022 0940    Acceptance, E,D, VU by TED at 5/4/2022 1345    Comment: Educated on safe sequencing with bed mobility, ambulatory transfers, and gait. Reviewed HEP and knee precautions.                   Point: Home exercise program (In Progress)     Learning Progress Summary           Patient Acceptance, E, NR by AS at 5/7/2022 0823    Eager, E, VU,NR by  at 5/6/2022 1636    Comment: Reviewed safety/technique with transfers, ambulation, HEP, PT POC    Eager, E, VU,NR by  at 5/6/2022 1626    Comment: Reviewed safety/technique with bed mobility, transfers, ambulation, HEP, KI, WB status, PT POC    Acceptance, E,TB, VU by  at 5/5/2022 1526    Acceptance, E,TB, VU by  at 5/5/2022 0940    Acceptance, E,D, VU by TED at 5/4/2022 1345    Comment: Educated on safe  sequencing with bed mobility, ambulatory transfers, and gait. Reviewed HEP and knee precautions.                   Point: Body mechanics (In Progress)     Learning Progress Summary           Patient Acceptance, E, NR by AS at 5/7/2022 0823    Eager, E, VU,NR by  at 5/6/2022 1636    Comment: Reviewed safety/technique with transfers, ambulation, HEP, PT POC    Eager, E, VU,NR by  at 5/6/2022 1626    Comment: Reviewed safety/technique with bed mobility, transfers, ambulation, HEP, KI, WB status, PT POC    Acceptance, E,TB, VU by  at 5/5/2022 1526    Acceptance, E,TB, VU by  at 5/5/2022 0940    Acceptance, E,D, VU by  at 5/4/2022 1345    Comment: Educated on safe sequencing with bed mobility, ambulatory transfers, and gait. Reviewed HEP and knee precautions.                   Point: Precautions (In Progress)     Learning Progress Summary           Patient Acceptance, E, NR by AS at 5/7/2022 0823    Eager, E, VU,NR by  at 5/6/2022 1636    Comment: Reviewed safety/technique with transfers, ambulation, HEP, PT POC    Eager, E, VU,NR by  at 5/6/2022 1626    Comment: Reviewed safety/technique with bed mobility, transfers, ambulation, HEP, KI, WB status, PT POC    Acceptance, E,TB, VU by  at 5/5/2022 1526    Acceptance, E,TB, VU by  at 5/5/2022 0940    Acceptance, E,D, VU by  at 5/4/2022 1345    Comment: Educated on safe sequencing with bed mobility, ambulatory transfers, and gait. Reviewed HEP and knee precautions.                               User Key     Initials Effective Dates Name Provider Type Discipline    AS 06/16/21 -  Tyra Wynn, PTA Physical Therapist Assistant PT     06/16/21 -  Lev Harper, PT Physical Therapist PT     09/22/20 -  Reinaldo Bridges, PT Physical Therapist PT     06/01/21 -  Ludy Perez, PT Physical Therapist PT              PT Recommendation and Plan     Plan of Care Reviewed With: patient  Progress: improving  Outcome Evaluation: patient ambulated 350 feet with  CGA x1 and rolling walker for suppport, cues to keep walker close and to improve posture. Patient able to increased gait distance this session without increase in pain. Completed isometric exercises and SLR on L LE.     Time Calculation:    PT Charges     Row Name 05/07/22 0824             Time Calculation    Start Time 0750  -AS      PT Received On 05/07/22  -AS      PT Goal Re-Cert Due Date 05/14/22  -AS              Timed Charges    11758 - PT Therapeutic Exercise Minutes 10  -AS      40820 - Gait Training Minutes  15  -AS              Total Minutes    Timed Charges Total Minutes 25  -AS       Total Minutes 25  -AS            User Key  (r) = Recorded By, (t) = Taken By, (c) = Cosigned By    Initials Name Provider Type    AS Tyra Wynn PTA Physical Therapist Assistant              Therapy Charges for Today     Code Description Service Date Service Provider Modifiers Qty    17592714607 HC PT THER PROC EA 15 MIN 5/7/2022 Tyra Wynn PTA GP 1    69340897972 HC GAIT TRAINING EA 15 MIN 5/7/2022 Tyra Wynn PTA GP 1          PT G-Codes  Outcome Measure Options: AM-PAC 6 Clicks Basic Mobility (PT)  AM-PAC 6 Clicks Score (PT): 19    Tyra Wynn PTA  5/7/2022

## 2022-05-07 NOTE — PLAN OF CARE
Goal Outcome Evaluation:  Plan of Care Reviewed With: patient        Progress: improving  Outcome Evaluation: patient ambulated 375 feet with CGA x1 and rolling walker for support, cues to take a shorter step with L LE due to increased trunk extension. Patient able to correct with cues. Patient reported and increase in pain this afternoon during mobility. Completed L LE exercises. Assist with SLR.

## 2022-05-07 NOTE — PROGRESS NOTES
"Pharmacy Consult  -  Warfarin    Corrina Reis is a  56 y.o. female   Height - 172.7 cm (67.99\")  Weight - 105 kg (231 lb 7.7 oz)    Consulting Provider: Ortho  Indication: History of DVT/PE  Goal INR: 2.5-3.5  Home Regimen: 3 mg daily     Bridge Therapy: No    Drug-Drug Interactions with current regimen:     Meloxicam - may increase risk of bleeding      Tramadol - may increase risk of bleeding    Warfarin Dosing During Admission:    Date  5/4 5/5 5/6 5/7        INR  1.35 1.43 1.49 1.68        Dose  3 mg 5 mg 5 mg (5mg)           Education Provided:  Warfarin education provided on 5/5 verbally and in writing.  Discussed effects of warfarin, importance of checking INR, drug-drug and drug-food interactions, and signs/symptoms of bleeding and clotting.  Patient verbalized understanding through teach back. Patient notes that she monitors her INR at home every 2 weeks with a goal INR range of 2.5-3.5. All pertinent questions were answered.      Discharge Follow up:   Following Provider - Dr. Abbey Merrill (PCP)   Follow up time range or appointment - 2-3 days after discharge    Labs:    Results from last 7 days   Lab Units 05/07/22  0822 05/06/22  0632 05/05/22  0558 05/04/22  0855 05/02/22  1208   INR  1.68* 1.49* 1.43* 1.35* 1.52*   APTT seconds  --   --   --   --  33.8*   HEMOGLOBIN g/dL  --  9.8* 9.9*  --  13.0   HEMATOCRIT %  --  29.4* 28.5*  --  39.0     Results from last 7 days   Lab Units 05/06/22  0858 05/05/22  0558 05/02/22  1208   SODIUM mmol/L 144 140 140   POTASSIUM mmol/L 4.1 4.5 3.8   CHLORIDE mmol/L 109* 106 103   CO2 mmol/L 27.0 23.0 27.0   BUN mg/dL 14 13 15   CREATININE mg/dL 1.03* 0.96 1.05*   CALCIUM mg/dL 8.8 8.5* 9.3   BILIRUBIN mg/dL 0.5  --  0.6   ALK PHOS U/L 77  --  79   ALT (SGPT) U/L 63*  --  21   AST (SGOT) U/L 118*  --  26   GLUCOSE mg/dL 139* 97 109*     Current dietary intake: % of meals documented in last 24 hours    Diet Order   Procedures    Diet Regular "     Assessment/Plan:     Patient's INR is subtherapeutic at 1.68 today (slight increase from 1.49 yesterday).   Goal INR 2.5-3.5. Patient receiving warfarin for hx of 2 DVTs and a PE.  Give another boosted dose of warfarin 5 mg today.   Discharge recommendations: take increased dose of warfarin 5 mg today. Follow with PCP for INR recheck in 2-3 days after discharge.  Daily PT/INR ordered.  Monitor signs/symptoms of bleeding, dietary intake, and drug-drug interactions. Make dose adjustments as necessary.  Pharmacy will continue to follow.    Thank you,    Raman Isaacs, PharmD  5/7/2022  09:05 EDT

## 2022-05-07 NOTE — PROGRESS NOTES
"IM progress note      Corrina Reis  [id number]  1965     LOS: 3 days     Attending: Zoila Benites MD    Primary Care Provider: Abbey Merrill MD      Chief Complaint/Reason for visit:  No chief complaint on file.      Subjective   Doing fairly well. No c/o f/c/n/vom/sob. Better pain control. No bm yet.   Arrow pump almost empty today.    Objective     Visit Vitals  /57   Pulse 78   Temp 98.1 °F (36.7 °C) (Oral)   Resp 18   Ht 172.7 cm (67.99\")   Wt 105 kg (231 lb 7.7 oz)   SpO2 95%   BMI 35.21 kg/m²     Temp (24hrs), Av.9 °F (36.6 °C), Min:97.3 °F (36.3 °C), Max:98.3 °F (36.8 °C)      Intake/Output:    Intake/Output Summary (Last 24 hours) at 2022 1055  Last data filed at 2022 0927  Gross per 24 hour   Intake 535 ml   Output 2500 ml   Net -1965 ml        Physical Therapy:   Progress: improving  Outcome Evaluation: Pt. musa sit to stand transfer w/contact guard assist. She ambulated 200' w/front wheeled walker, contact guard assist. Gait limited by pain, fatigue. Increased effort required. She tolerated ther-ex well. Will continue to progress as able.    Physical Exam:     General Appearance:    Alert, cooperative, in no acute distress   Head:    Normocephalic, without obvious abnormality, atraumatic    Lungs:     Normal effort, symmetric chest rise,  clear to      auscultation bilaterally              Heart:    Regular rhythm and normal rate, normal S1 and S2    Abdomen:     Normal bowel sounds, no masses, no organomegaly, soft        non-tender, non-distended, no guarding, no rebound                tenderness   Extremities:  Clean dry and intact dressing.  Lianna suction dressing.  Drain to Hemovac(out today), peripheral nerve block catheter.  Knee immobilizer on.  Intact flexion, dorsiflexion bilateral feet.  No clubbing, cyanosis or edema.  No deformities.    Pulses:   Pulses palpable and equal bilaterally   Skin:   No bleeding, bruising or rash      "     Results Review:     I reviewed the patient's new clinical results.   Results from last 7 days   Lab Units 05/07/22  0823 05/06/22  0632 05/05/22  0558   WBC 10*3/mm3 6.36 6.25 10.00   HEMOGLOBIN g/dL 10.5* 9.8* 9.9*   HEMATOCRIT % 29.8* 29.4* 28.5*   PLATELETS 10*3/mm3 182 171 155     Results from last 7 days   Lab Units 05/07/22  0822 05/06/22  0858 05/05/22  0558 05/02/22  1208   SODIUM mmol/L 137 144 140 140   POTASSIUM mmol/L 3.9 4.1 4.5 3.8   CHLORIDE mmol/L 103 109* 106 103   CO2 mmol/L 24.0 27.0 23.0 27.0   BUN mg/dL 13 14 13 15   CREATININE mg/dL 0.94 1.03* 0.96 1.05*   CALCIUM mg/dL 8.8 8.8 8.5* 9.3   BILIRUBIN mg/dL 0.6 0.5  --  0.6   ALK PHOS U/L 121* 77  --  79   ALT (SGPT) U/L 134* 63*  --  21   AST (SGOT) U/L 162* 118*  --  26   GLUCOSE mg/dL 109* 139* 97 109*     I reviewed the patient's new imaging including images and reports.   Latest Reference Range & Units 05/07/22 08:22   Protime 11.4 - 14.4 Seconds 19.8 (H)   INR 0.84 - 1.13  1.68 (H)   (H): Data is abnormally high  All medications reviewed.   acetaminophen, 1,000 mg, Oral, Q8H  amitriptyline, 50 mg, Oral, Nightly  baclofen, 10 mg, Oral, TID  DAPTOmycin, 6 mg/kg (Adjusted), Intravenous, Q24H  famotidine, 20 mg, Oral, Daily  gabapentin, 300 mg, Oral, TID  levothyroxine, 100 mcg, Oral, Q AM  meloxicam, 15 mg, Oral, Daily  midodrine, 5 mg, Oral, TID AC  polyethylene glycol, 17 g, Oral, Daily  sertraline, 100 mg, Oral, Daily  sodium chloride, 10 mL, Intravenous, Q12H  warfarin, 5 mg, Oral, Daily      bisacodyl, 10 mg, Daily PRN  cyclobenzaprine, 5 mg, TID PRN  diphenhydrAMINE, 25 mg, Q6H PRN   Or  diphenhydrAMINE, 25 mg, Q6H PRN  HYDROmorphone, 0.5 mg, Q2H PRN   And  naloxone, 0.1 mg, Q5 Min PRN  ketorolac, 15 mg, Q6H PRN  labetalol, 10 mg, Q4H PRN  ondansetron, 4 mg, Q6H PRN   Or  ondansetron, 4 mg, Q6H PRN  ondansetron, 4 mg, Q6H PRN  oxyCODONE, 10 mg, Q4H PRN  oxyCODONE, 5 mg, Q4H PRN  Pharmacy to dose warfarin, , Continuous PRN  sodium  chloride, 500 mL, TID PRN  sodium chloride, 10 mL, PRN  traMADol, 50 mg, Q8H PRN        Assessment/Plan       S/P irrigation and debridement, left knee antibiotic spacer placement    Autonomic orthostatic hypotension    Infection of left knee (HCC)    Hyperlipidemia    Hypothyroid    History of DVT (deep vein thrombosis)    Chronic anticoagulation    Acute blood loss anemia, mild, asymptomatic  Status post PICC placement 5/6/2022       Plan   1. PT/OT- Knee immobilizer for first 2 weeks no knee flexion for the first 2 weeks  2. Pain control-prns , PNB cath, ropivacaine.    3. IS-encourage  4. DVT proph- Mechs/coumadin  5. Bowel regimen  6. Resume home medications as appropriate  7. Monitor post-op labs  8. DC planning , patient is interested in rehab facility.   following, referrals made.     Dr. Ascencio with [facility] following for antibiotics.  Follow op cultures.      Hypotension, Hyperlipidemia  - Continue home statin, and midodrine   -Hold HCTZ for now  - Monitor BP   - Holding parameters for BP meds  - Labetalol PRN for SBP>170     Hypothyroid  -Continue home Synthroid       Zoila Benites MD  05/07/22  10:55 EDT

## 2022-05-07 NOTE — PLAN OF CARE
Goal Outcome Evaluation:  Plan of Care Reviewed With: patient        Progress: improving  Outcome Evaluation: patient ambulated 350 feet with CGA x1 and rolling walker for suppport, cues to keep walker close and to improve posture. Patient able to increased gait distance this session without increase in pain. Completed isometric exercises and SLR on L LE.

## 2022-05-07 NOTE — PLAN OF CARE
A&Ox4, mood/affect appropriate. Speech clear/logical. Lung sound clear bilaterally. Patient vigorously exercise IS. Elastic bandage CDI. NPMV in place. VSS. UOP ADQ, ambulates in the room with one assist. Will cont to mx. Call light in reach.

## 2022-05-07 NOTE — PLAN OF CARE
Patient is AxO x4. PICC was placed today. Received abx today. LEONIE THOMPSON. Worked with PT and walked with nursing staff. Ropivacaine at 10 ml/hr. [place] wound vac still in place. Immobilizer on left leg. Plan is to potentially discharge Monday or Tuesday for rehab in [facility]. Will continue to monitor.

## 2022-05-07 NOTE — PROGRESS NOTES
INFECTIOUS DISEASE Progress Note    Corrina Reis  1965  [id number]    Consult: 5/5/22  Admit date: 5/4/2022    Requesting Provider: Rei Sutherland MD  Evaluating physician: Reji Ascencio MD  Reason for Consultation: MRSA septic left knee  Chief Complaint: Above      Subjective   History of present illness:  Patient is a very pleasant  56 y.o.  Yr old female with a history of borderline diabetes mellitus, anxiety, DVT in the past, GERD, spinal cord stimulator for chronic pain (2012), who fell in April 2021 with left knee injury and subsequent MRSA infection treated with arthroscopic washout in April 2021.  Cultures reported positive for MRSA in the knee and in blood and she was treated with about 6 weeks of IV vancomycin (until around 6/10/2021 per Dr. Evaristo Figueroa, infectious disease at [facility]), followed by 6 weeks of doxycycline.  The patient had progressive left knee pain with inability to ambulate and was evaluated by Dr. Rei Bee who aspirated left knee with reported fluid showing 147 WBCs, 41% PMNs.  CT scan without evidence of abscess or sequestrum.  The patient was taken to the operating room after admission to [facility] on 5/4/2022, where she was found to have extensive injuries including minimal cartilage, extensive osteophytes.  Antibiotic spacer was placed.  I was consulted on 5/5/2022 for further evaluation and treatment.  No other localizing signs or symptoms of infection.    5/6/2022 history reviewed.  No high fevers or chills.  Still has a little bit of left knee pain.  Tolerating daptomycin until 6/16/2022.  Left knee culture negative to date.  May be looking at transferring to skilled facility.    5/7/2022 history reviewed.  Knee pain controlled.  On daptomycin until 6/16/2022 for left knee MRSA infection.  Status postdebridement and antibiotic spacer placed 5/4/2022.  Awaits knee replacement in 8 to 10 weeks.    Past Medical History:    Diagnosis Date   • Anxiety    • Borderline diabetes     daily at home checks   • Deep vein thrombosis (HCC)     left leg 2001 and 2014   • GERD (gastroesophageal reflux disease)    • History of migraine     botox shots for migraines   • History of pulmonary embolus (PE) 2014   • History of transfusion     autologus blood with back surgery   • Hyperlipidemia    • Hypertension    • IBS (irritable bowel syndrome)    • MRSA infection 04/2021    MRSA of left knee; IV antibioitcs x 4 weeks and then 6 weeks of PO antibiotics   • PONV (postoperative nausea and vomiting)    • Pyogenic arthritis of left hip (HCC) 04/2021   • Thyroid nodule     nodules are monitored       Past Surgical History:   Procedure Laterality Date   • APPENDECTOMY  1978   • BACK SURGERY      Daniel removed   • CHOLECYSTECTOMY  1997   • COLONOSCOPY     • ENDOMETRIAL ABLATION     • HYSTERECTOMY  1998   • SPINAL CORD STIMULATOR IMPLANT  2013   • SPINAL FUSION  1978   • THORACIC OUTLET SURGERY      1994-partial removal of sternum 1995-rib removal   • TOTAL KNEE  PROSTHESIS REMOVAL W/ SPACER INSERTION Left 5/4/2022    Procedure: IRRIGATION AND DEBRIDMENT, LEFT KNEE ANTIBIOTIC SPACER PLACEMENT;  Surgeon: Rei Sutherland MD;  Location: [facility];  Service: Orthopedics;  Laterality: Left;       Pediatric History   Patient Parents   • Not on file     Other Topics Concern   • Not on file   Social History Narrative   • Not on file       family history includes Atrial fibrillation in her mother; Autoimmune disease in her sister; Diabetes in her brother, mother, and sister; Graves' disease in her brother and sister; Heart attack in her brother; Heart disease in her mother; Hypertension in her brother, brother, and sister; Lung cancer in her brother; No Known Problems in her father and sister; Rectal cancer in her brother.    Allergies   Allergen Reactions   • Msg [Monosodium Glutamate] Nausea And Vomiting and Headache     Numbness around mouth and eyes   • Orange  Nausea And Vomiting and Headache     Numbness around mouth and eyes   • Penicillins Hives   • Sulfa Antibiotics Hives       Immunization History   Administered Date(s) Administered   • COVID-19 (MODERNA) 1st, 2nd, 3rd Dose Only 03/19/2021, 05/12/2021       Medication:    Current Facility-Administered Medications:   •  acetaminophen (TYLENOL) tablet 1,000 mg, 1,000 mg, Oral, Q8H, Rei Sutherland MD, 1,000 mg at 05/07/22 0506  •  amitriptyline (ELAVIL) tablet 50 mg, 50 mg, Oral, Nightly, Gillian Hurtado, SACHIN, 50 mg at 05/06/22 1948  •  baclofen (LIORESAL) tablet 10 mg, 10 mg, Oral, TID, Gillian Hurtado APRN, 10 mg at 05/07/22 0847  •  bisacodyl (DULCOLAX) suppository 10 mg, 10 mg, Rectal, Daily PRN, Zoila Benites MD  •  cyclobenzaprine (FLEXERIL) tablet 5 mg, 5 mg, Oral, TID PRN, Gillian Hurtado, SACHIN  •  DAPTOmycin (CUBICIN) 500 mg in sodium chloride 0.9 % 50 mL IVPB, 6 mg/kg (Adjusted), Intravenous, Q24H, Reji Ascencio MD, Last Rate: 100 mL/hr at 05/06/22 1138, 500 mg at 05/06/22 1138  •  diphenhydrAMINE (BENADRYL) capsule 25 mg, 25 mg, Oral, Q6H PRN **OR** diphenhydrAMINE (BENADRYL) injection 25 mg, 25 mg, Intravenous, Q6H PRN, Rei Sutherland MD  •  famotidine (PEPCID) tablet 20 mg, 20 mg, Oral, Daily, Gillian Hurtado APRN, 20 mg at 05/07/22 0847  •  gabapentin (NEURONTIN) capsule 300 mg, 300 mg, Oral, TID, Zoila Benites MD, 300 mg at 05/07/22 0847  •  HYDROmorphone (DILAUDID) injection 0.5 mg, 0.5 mg, Intravenous, Q2H PRN **AND** naloxone (NARCAN) injection 0.1 mg, 0.1 mg, Intravenous, Q5 Min PRN, Rei Sutherland MD  •  ketorolac (TORADOL) injection 15 mg, 15 mg, Intravenous, Q6H PRN, Rei Sutherland MD, 15 mg at 05/06/22 0520  •  labetalol (NORMODYNE,TRANDATE) injection 10 mg, 10 mg, Intravenous, Q4H PRN, Gillian Hurtado APRN  •  lactated ringers infusion, 9 mL/hr, Intravenous, Continuous, Rei Sutherland MD, Stopped at 05/04/22 1306  •  lactated ringers infusion, 100 mL/hr,  Intravenous, Continuous, Rei Sutherland MD, Last Rate: 100 mL/hr at 05/05/22 0202, 100 mL/hr at 05/05/22 0202  •  levothyroxine (SYNTHROID, LEVOTHROID) tablet 100 mcg, 100 mcg, Oral, Q AM, Gillian Hurtado APRN, 100 mcg at 05/07/22 0511  •  meloxicam (MOBIC) tablet 15 mg, 15 mg, Oral, Daily, Rei Sutherland MD, 15 mg at 05/07/22 0847  •  midodrine (PROAMATINE) tablet 5 mg, 5 mg, Oral, TID AC, Gillian Hurtado APRN, 5 mg at 05/07/22 0847  •  ondansetron (ZOFRAN) tablet 4 mg, 4 mg, Oral, Q6H PRN **OR** ondansetron (ZOFRAN) injection 4 mg, 4 mg, Intravenous, Q6H PRN, Rei Sutherland MD  •  ondansetron (ZOFRAN) injection 4 mg, 4 mg, Intravenous, Q6H PRN, Gillian Hurtado APRN  •  oxyCODONE (ROXICODONE) immediate release tablet 10 mg, 10 mg, Oral, Q4H PRN, Rei Sutherland MD  •  oxyCODONE (ROXICODONE) immediate release tablet 5 mg, 5 mg, Oral, Q4H PRN, Rei Sutherland MD, 5 mg at 05/06/22 1135  •  Pharmacy to dose warfarin, , Does not apply, Continuous PRN, Rei Sutherland MD  •  polyethylene glycol (MIRALAX) packet 17 g, 17 g, Oral, Daily, Zoila Benites MD  •  ropivacaine (Naropin) 0.2% in NS infusion (ARROW Pump), 10 mL/hr, Peripheral Nerve, Continuous, Rei Sutherland MD, Last Rate: 10 mL/hr at 05/06/22 1600, 10 mL/hr at 05/06/22 1600  •  sertraline (ZOLOFT) tablet 100 mg, 100 mg, Oral, Daily, Gillian Hurtado APRN, 100 mg at 05/07/22 0847  •  sodium chloride 0.9 % bolus 500 mL, 500 mL, Intravenous, TID PRN, Gillian Hurtado APRN  •  sodium chloride 0.9 % flush 10 mL, 10 mL, Intravenous, Q12H, Reji Ascencio MD, 10 mL at 05/06/22 1948  •  sodium chloride 0.9 % flush 10 mL, 10 mL, Intravenous, PRN, Reji Ascencio MD  •  traMADol (ULTRAM) tablet 50 mg, 50 mg, Oral, Q8H PRN, Rei Sutherland MD, 50 mg at 05/06/22 1502  •  warfarin (COUMADIN) tablet 5 mg, 5 mg, Oral, Daily, Caitlin Modi, PharmD, 5 mg at 05/06/22 1740    Please refer to the medical record for a full medication list    Review of  "Systems:    Constitutional-- No Fever, chills or sweats.  Appetite good, and no malaise. No fatigue.  HEENT-- No new vision, hearing or throat complaints.  No epistaxis or oral sores.  Denies odynophagia or dysphagia.  No odynophagia or dysphagia. No headache, photophobia or neck stiffness.  CV-- No chest pain, palpitation or syncope  Resp-- No SOB/cough/Hemoptysis  GI- No nausea, vomiting, or diarrhea.  No hematochezia, melena, or hematemesis. Denies jaundice or chronic liver disease.  -- No dysuria, hematuria, or flank pain.  Denies hesitancy, urgency or flank pain.  Lymph- no swollen lymph nodes in neck/axilla or groin.   Heme- No active bruising or bleeding; no Hx of DVT or PE.  MS-- no swelling or pain in the bones or joints of arms/legs.  No new back pain.  Except left knee pain.  Neuro-- No acute focal weakness or numbness in the arms or legs.  No seizures.  Skin--No rashes or lesions    Physical Exam:   Vital Signs   Temp:  [97.3 °F (36.3 °C)-98.3 °F (36.8 °C)] 98.1 °F (36.7 °C)  Heart Rate:  [74-82] 78  Resp:  [16-18] 18  BP: (113-132)/(57-73) 123/57    Temp  Min: 97.3 °F (36.3 °C)  Max: 98.3 °F (36.8 °C)  BP  Min: 113/63  Max: 132/71  Pulse  Min: 74  Max: 82  Resp  Min: 16  Max: 18  SpO2  Min: 95 %  Max: 97 %    Blood pressure 123/57, pulse 78, temperature 98.1 °F (36.7 °C), temperature source Oral, resp. rate 18, height 172.7 cm (67.99\"), weight 105 kg (231 lb 7.7 oz), SpO2 95 %.  GENERAL: Awake and alert, in moderate distress. Appears older than stated age.  Resting in bed.  HEENT:  Normocephalic, atraumatic.  Oropharynx without thrush. Dentition in good repair. No cervical adenopathy. No neck masses.  Ears externally normal, Nose externally normal.  EYES:  No conjunctival injection. No icterus. EOM full.  LYMPHATICS: No lymphadenopathy of the neck or axillary or inguinal regions.   HEART: No murmur, gallop, or pericardial friction rub. Reg rate rhythm, No JVD at 45 degrees.  LUNGS: Clear to " auscultation and percussion. No respiratory distress, no use of accessory muscles.  No wheezing.  ABDOMEN: Soft, nontender, nondistended. No appreciable HSM.  Bowel sounds normal.  SKIN: Warm and dry without cutaneous eruptions.  No nodules.  Left knee surgical dressing in place, surgical dressing in place without drainage.  PSYCHIATRIC: Mental status lucid. No confusion.  EXT:  No cellulitic change.  Decreased range of motion left knee.  NEURO: Oriented to name, nonfocal.      Results Review:   I reviewed the patient's new clinical results.  I reviewed the patient's new imaging results and agree with the interpretation.  I reviewed the patient's other test results and agree with the interpretation    Results from last 7 days   Lab Units 05/07/22  0823 05/06/22  0632 05/05/22  0558   WBC 10*3/mm3 6.36 6.25 10.00   HEMOGLOBIN g/dL 10.5* 9.8* 9.9*   HEMATOCRIT % 29.8* 29.4* 28.5*   PLATELETS 10*3/mm3 182 171 155     Results from last 7 days   Lab Units 05/07/22  0822   SODIUM mmol/L 137   POTASSIUM mmol/L 3.9   CHLORIDE mmol/L 103   CO2 mmol/L 24.0   BUN mg/dL 13   CREATININE mg/dL 0.94   GLUCOSE mg/dL 109*   CALCIUM mg/dL 8.8     Results from last 7 days   Lab Units 05/07/22  0822   ALK PHOS U/L 121*   BILIRUBIN mg/dL 0.6   ALT (SGPT) U/L 134*   AST (SGOT) U/L 162*     Results from last 7 days   Lab Units 05/06/22  0632   SED RATE mm/hr 10     Results from last 7 days   Lab Units 05/06/22  0858   CRP mg/dL 5.02*    on 5/6/2022, 306 on 5/5 prior to daptomycin.  451 on 5/7.          Estimated Creatinine Clearance: 82.7 mL/min (by C-G formula based on SCr of 0.94 mg/dL).    Microbiology:  Microbiology Results Abnormal     Procedure Component Value - Date/Time    Tissue / Bone Culture - Tissue, Knee, Left [[id number]] Collected: 05/04/22 1114    Lab Status: Final result Specimen: Tissue from Knee, Left Updated: 05/07/22 0644     Tissue Culture No growth at 3 days     Gram Stain No WBCs or organisms seen    Tissue /  Bone Culture - Tissue, Knee, Left [[id number]] Collected: 05/04/22 1114    Lab Status: Final result Specimen: Tissue from Knee, Left Updated: 05/07/22 0644     Tissue Culture No growth at 3 days     Gram Stain Rare (1+) WBCs seen      No organisms seen    Tissue / Bone Culture - Tissue, Knee, Left [[id number]] Collected: 05/04/22 1113    Lab Status: Final result Specimen: Tissue from Knee, Left Updated: 05/07/22 0644     Tissue Culture No growth at 3 days     Gram Stain No WBCs or organisms seen    AFB Culture - Tissue, Knee, Left [[id number]] Collected: 05/04/22 1113    Lab Status: Preliminary result Specimen: Tissue from Knee, Left Updated: 05/05/22 1144     AFB Stain No acid fast bacilli seen on concentrated smear          Radiology:  Imaging Results (Last 72 Hours)     Procedure Component Value Units Date/Time    XR Knee 1 or 2 View Left [[id number]] Collected: 05/04/22 1416     Updated: 05/04/22 1424    Narrative:      EXAMINATION: XR KNEE 1 OR 2 VW LEFT-     DATE OF EXAM: 5/4/2022 1:57 PM     INDICATION: Post-Op Knee Arthoplasty; M00.9-Pyogenic arthritis,  unspecified.     COMPARISON: None available     TECHNIQUE: One or Two views of the left knee were obtained.     FINDINGS:  There is a radiolucent spacer at the tibial plateau. The distal femoral  component appears in expected alignment. There are intramedullary rods  with surrounding cement present within the distal femur and proximal  tibia. There are cutaneous staples present at the midline.       Impression:      1. Expected postoperative changes of the left knee.     This report was finalized on 5/4/2022 2:20 PM by Chivo Sommer MD.             IMPRESSION:     1.  Left knee MRSA infection, previous I&D April 2021 and treated, along with treated MRSA sepsis.  Data deficit.  Extensive injury to left knee resulting in the need for eventual prosthetic knee replacement.  Debrided on 5/4/2022 with antibiotic spacer placed.  Cultures negative to date.  MRSA swab  negative.  2.  Anemia, related to acute postoperative blood loss and chronic disease.    3.  Hypocalcemia.  Resolved.  4.  Prediabetes.  5.  History of GERD, DVT, chronic pain with spinal cord stimulator.  6.  Previous history of MRSA sepsis and left knee septic arthritis April 2021.  7.  Mild CPK elevation prior to daptomycin, 451 on 5/7, worse.  Likely from surgery, but may also be Dapto.   8.  , , worse.  Probably from medications versus other.    Plan:    1.  Diagnostically, continue to follow patient's physical exam, CBC, CMP, CRP, CPK, cultures obtained from surgery on 5/4/2022.  2.  Therapeutically, continue daptomycin 6 mg/kg IV daily for 6 weeks until approximately 6/16/2022 and reassess.  Hold atorvastatin while on daptomycin.  We will arrange for this as outpatient therapy, or in rehab facility.  3.  Supportive care.    I discussed the patient's findings and my recommendations with patient and nursing staff    Thank you for asking me to see Corrina Reis.  Our group would be pleased to follow this patient over the course of their hospitalization and assist with outpatient antimicrobial therapy, as indicated.  Further recommendations depend on the results of the cultures and clinical course.  Side effects of antibiotics discussed.    Case management orders: Please arrange for home antibiotics with [facility] with daptomycin 500 mg IV daily until 6/16/2022.  Check CBC, CMP, CRP, CPK weekly while on IV antibiotics.  Fax orders to [phone], call [phone] with final arrangements. Arrange for follow-up with me by telehealth in 1 week, and in person in 2 weeks.    Reji Ascencio MD  5/7/2022

## 2022-05-07 NOTE — PROGRESS NOTES
[facility]    Acute pain service Inpatient Progress Note    Patient Name: Corrina Reis  :  1965  MRN:  [id number]        Acute Pain  Service Inpatient Progress Note:    Analgesia:Excellent  Pain Score:0/10  LOC: alert and awake  Resp Status: room air  Cardiac: VS stable  Side Effects:None  Catheter Site:clean, dressing intact and dry  Cath type: peripheral nerve cath with ON Q  Infusion rate: 10ml/hr  Dosing/Volume: ropivacaine 0.2%  Catheter Plan:Catheter to remain Insitu and Continue catheter infusion rate unchanged    0/10 anterior  3/10 Posterior knee epain

## 2022-05-07 NOTE — PLAN OF CARE
Problem: Adult Inpatient Plan of Care  Goal: Plan of Care Review  Outcome: Ongoing, Progressing  Flowsheets  Taken 5/7/2022 1635 by Valentina Mathis RN  Progress: improving  Taken 5/7/2022 1336 by Tyra Wynn PTA  Plan of Care Reviewed With: patient  Goal: Patient-Specific Goal (Individualized)  Outcome: Ongoing, Progressing  Goal: Absence of Hospital-Acquired Illness or Injury  Outcome: Ongoing, Progressing  Intervention: Identify and Manage Fall Risk  Recent Flowsheet Documentation  Taken 5/7/2022 1600 by Valentina Mathis RN  Safety Promotion/Fall Prevention:   activity supervised   assistive device/personal items within reach   clutter free environment maintained   room organization consistent   safety round/check completed   toileting scheduled  Taken 5/7/2022 1400 by Valentina Mathis RN  Safety Promotion/Fall Prevention:   activity supervised   assistive device/personal items within reach   clutter free environment maintained   room organization consistent   safety round/check completed   toileting scheduled  Taken 5/7/2022 1200 by Valentina Mathis RN  Safety Promotion/Fall Prevention:   activity supervised   assistive device/personal items within reach   clutter free environment maintained   room organization consistent   safety round/check completed   toileting scheduled  Taken 5/7/2022 1000 by Valentina Mathis RN  Safety Promotion/Fall Prevention:   activity supervised   assistive device/personal items within reach   clutter free environment maintained   room organization consistent   safety round/check completed   toileting scheduled  Taken 5/7/2022 0847 by Valentina Mathis RN  Safety Promotion/Fall Prevention:   activity supervised   assistive device/personal items within reach   clutter free environment maintained   room organization consistent   safety round/check completed   toileting scheduled  Intervention: Prevent Skin Injury  Recent Flowsheet Documentation  Taken 5/7/2022 1600 by  Valentina Mathis RN  Body Position: legs elevated  Taken 5/7/2022 1400 by Valentina Mathis RN  Body Position: legs elevated  Taken 5/7/2022 1200 by Valentina Mathis RN  Body Position: position changed independently  Taken 5/7/2022 1000 by Valentina Mathis RN  Body Position: position changed independently  Taken 5/7/2022 0847 by Valentina Mathis RN  Body Position: legs elevated  Intervention: Prevent and Manage VTE (Venous Thromboembolism) Risk  Recent Flowsheet Documentation  Taken 5/7/2022 1600 by Valentina Mathis RN  Activity Management: up in chair  VTE Prevention/Management:   right   sequential compression devices on  Taken 5/7/2022 1400 by Valentina Mathis RN  Activity Management: up in chair  VTE Prevention/Management:   right   sequential compression devices on  Taken 5/7/2022 1200 by Valentina Mathis RN  Activity Management:   activity adjusted per tolerance   activity encouraged  VTE Prevention/Management:   right   sequential compression devices on  Taken 5/7/2022 1000 by Valentina Mathis RN  Activity Management:   activity adjusted per tolerance   activity encouraged  VTE Prevention/Management:   right   sequential compression devices on  Taken 5/7/2022 0847 by Valentina Mathis RN  Activity Management:   activity adjusted per tolerance   activity encouraged  VTE Prevention/Management:   bilateral   sequential compression devices on  Intervention: Prevent Infection  Recent Flowsheet Documentation  Taken 5/7/2022 1600 by Valentina Mathis RN  Infection Prevention:   environmental surveillance performed   rest/sleep promoted  Taken 5/7/2022 1400 by Valentina Mathis RN  Infection Prevention:   environmental surveillance performed   rest/sleep promoted  Taken 5/7/2022 1200 by Valentina Mathis RN  Infection Prevention:   environmental surveillance performed   rest/sleep promoted  Taken 5/7/2022 1000 by Valentina Mathis RN  Infection Prevention:   rest/sleep promoted   environmental surveillance  performed  Taken 5/7/2022 0847 by Valentina Mathis RN  Infection Prevention:   environmental surveillance performed   rest/sleep promoted  Goal: Optimal Comfort and Wellbeing  Outcome: Ongoing, Progressing  Intervention: Provide Person-Centered Care  Recent Flowsheet Documentation  Taken 5/7/2022 1200 by Valentina Mathis RN  Trust Relationship/Rapport: care explained  Taken 5/7/2022 0847 by Valentina Mathis RN  Trust Relationship/Rapport: care explained  Goal: Readiness for Transition of Care  Outcome: Ongoing, Progressing     Problem: Adjustment to Surgery (Knee Arthroplasty)  Goal: Optimal Coping  Outcome: Ongoing, Progressing     Problem: Bleeding (Knee Arthroplasty)  Goal: Absence of Bleeding  Outcome: Ongoing, Progressing     Problem: Bowel Motility Impaired (Knee Arthroplasty)  Goal: Effective Bowel Elimination  Outcome: Ongoing, Progressing     Problem: Fluid and Electrolyte Imbalance (Knee Arthroplasty)  Goal: Fluid and Electrolyte Balance  Outcome: Ongoing, Progressing     Problem: Functional Ability Impaired (Knee Arthroplasty)  Goal: Optimal Functional Ability  Outcome: Ongoing, Progressing  Intervention: Promote Optimal Functional Status  Recent Flowsheet Documentation  Taken 5/7/2022 1600 by Valentina Mathis RN  Activity Management: up in chair  Taken 5/7/2022 1400 by Valentina Mathis RN  Activity Management: up in chair  Taken 5/7/2022 1200 by Valentina Mathis RN  Activity Management:   activity adjusted per tolerance   activity encouraged  Taken 5/7/2022 1000 by Valentina Mathis RN  Activity Management:   activity adjusted per tolerance   activity encouraged  Taken 5/7/2022 0847 by Valentina Mathis RN  Activity Management:   activity adjusted per tolerance   activity encouraged     Problem: Infection (Knee Arthroplasty)  Goal: Absence of Infection Signs and Symptoms  Outcome: Ongoing, Progressing  Intervention: Prevent or Manage Infection  Recent Flowsheet Documentation  Taken 5/7/2022 1600 by  Valentina Mathis RN  Infection Prevention:   environmental surveillance performed   rest/sleep promoted  Taken 5/7/2022 1400 by Valentina Mathis RN  Infection Prevention:   environmental surveillance performed   rest/sleep promoted  Taken 5/7/2022 1200 by Valentina Mathis RN  Infection Prevention:   environmental surveillance performed   rest/sleep promoted  Taken 5/7/2022 1000 by Valentina Mathis RN  Infection Prevention:   rest/sleep promoted   environmental surveillance performed  Taken 5/7/2022 0847 by Valentina Mathis RN  Infection Prevention:   environmental surveillance performed   rest/sleep promoted     Problem: Neurovascular Compromise (Knee Arthroplasty)  Goal: Intact Neurovascular Status  Outcome: Ongoing, Progressing     Problem: Ongoing Anesthesia Effects (Knee Arthroplasty)  Goal: Anesthesia/Sedation Recovery  Outcome: Ongoing, Progressing  Intervention: Optimize Anesthesia Recovery  Recent Flowsheet Documentation  Taken 5/7/2022 1600 by Valentina Mathis RN  Safety Promotion/Fall Prevention:   activity supervised   assistive device/personal items within reach   clutter free environment maintained   room organization consistent   safety round/check completed   toileting scheduled  Administration (IS): self-administered  Taken 5/7/2022 1400 by Valentina Mathis RN  Safety Promotion/Fall Prevention:   activity supervised   assistive device/personal items within reach   clutter free environment maintained   room organization consistent   safety round/check completed   toileting scheduled  Taken 5/7/2022 1200 by Valentina Mathis RN  Safety Promotion/Fall Prevention:   activity supervised   assistive device/personal items within reach   clutter free environment maintained   room organization consistent   safety round/check completed   toileting scheduled  Administration (IS): self-administered  Taken 5/7/2022 1000 by Valentina Mathis RN  Safety Promotion/Fall Prevention:   activity supervised    assistive device/personal items within reach   clutter free environment maintained   room organization consistent   safety round/check completed   toileting scheduled  Taken 5/7/2022 0847 by Valentina Mathis RN  Safety Promotion/Fall Prevention:   activity supervised   assistive device/personal items within reach   clutter free environment maintained   room organization consistent   safety round/check completed   toileting scheduled  Administration (IS): self-administered     Problem: Pain (Knee Arthroplasty)  Goal: Acceptable Pain Control  Outcome: Ongoing, Progressing     Problem: Postoperative Nausea and Vomiting (Knee Arthroplasty)  Goal: Nausea and Vomiting Relief  Outcome: Ongoing, Progressing     Problem: Postoperative Urinary Retention (Knee Arthroplasty)  Goal: Effective Urinary Elimination  Outcome: Ongoing, Progressing     Problem: Respiratory Compromise (Knee Arthroplasty)  Goal: Effective Oxygenation and Ventilation  Outcome: Ongoing, Progressing  Intervention: Optimize Oxygenation and Ventilation  Recent Flowsheet Documentation  Taken 5/7/2022 1600 by Valentina Mathis RN  Administration (IS): self-administered  Taken 5/7/2022 1200 by Valentina Mathis RN  Administration (IS): self-administered  Taken 5/7/2022 1000 by Valentina Mathis RN  Head of Bed (HOB) Positioning: HOB at 30-45 degrees  Taken 5/7/2022 0847 by Valentina Mathis RN  Administration (IS): self-administered     Problem: Infection  Goal: Absence of Infection Signs and Symptoms  Outcome: Ongoing, Progressing     Problem: Fall Injury Risk  Goal: Absence of Fall and Fall-Related Injury  Outcome: Ongoing, Progressing  Intervention: Identify and Manage Contributors  Recent Flowsheet Documentation  Taken 5/7/2022 1600 by Valentina Mathis RN  Medication Review/Management: medications reviewed  Taken 5/7/2022 1400 by Valentina Mathis RN  Medication Review/Management: medications reviewed  Taken 5/7/2022 1200 by Valentina Mathis  RN  Medication Review/Management: medications reviewed  Taken 5/7/2022 1000 by Valentina Mathis RN  Medication Review/Management: medications reviewed  Taken 5/7/2022 0847 by Valentina Mathis RN  Medication Review/Management: medications reviewed  Intervention: Promote Injury-Free Environment  Recent Flowsheet Documentation  Taken 5/7/2022 1600 by Valentina Mathis RN  Safety Promotion/Fall Prevention:   activity supervised   assistive device/personal items within reach   clutter free environment maintained   room organization consistent   safety round/check completed   toileting scheduled  Taken 5/7/2022 1400 by Valentina Mathis RN  Safety Promotion/Fall Prevention:   activity supervised   assistive device/personal items within reach   clutter free environment maintained   room organization consistent   safety round/check completed   toileting scheduled  Taken 5/7/2022 1200 by Valentina Mathis RN  Safety Promotion/Fall Prevention:   activity supervised   assistive device/personal items within reach   clutter free environment maintained   room organization consistent   safety round/check completed   toileting scheduled  Taken 5/7/2022 1000 by Valentina Mathis RN  Safety Promotion/Fall Prevention:   activity supervised   assistive device/personal items within reach   clutter free environment maintained   room organization consistent   safety round/check completed   toileting scheduled  Taken 5/7/2022 0847 by Valentina Mathis RN  Safety Promotion/Fall Prevention:   activity supervised   assistive device/personal items within reach   clutter free environment maintained   room organization consistent   safety round/check completed   toileting scheduled   Goal Outcome Evaluation:           Progress: improving     Pt alert and oriented x4. VSS. RA. Pain controlled with scheduled tylenol. No complaints of numbness or tingling. Knee immobilizer utilized while patient ambulates. PICC JCARLOS, dressing CDI. ACE wrap CDI.  Arrow @10ml/hr. Plan to d/c on Monday or Tuesday to facility in [facility].

## 2022-05-07 NOTE — THERAPY TREATMENT NOTE
Patient Name: Corrina Reis  : 1965    MRN: [id number]                              Today's Date: 2022       Admit Date: 2022    Visit Dx:     ICD-10-CM ICD-9-CM   1. S/P irrigation and debridement, left knee antibiotic spacer placement  Z98.890 V45.89   2. Infection of left knee (HCC)  M00.9 686.9     Patient Active Problem List   Diagnosis   • Autonomic orthostatic hypotension   • Infection of left knee (HCC)   • S/P irrigation and debridement, left knee antibiotic spacer placement   • Hyperlipidemia   • Hypothyroid   • History of DVT (deep vein thrombosis)   • Chronic anticoagulation     Past Medical History:   Diagnosis Date   • Anxiety    • Borderline diabetes     daily at home checks   • Deep vein thrombosis (HCC)     left leg  and    • GERD (gastroesophageal reflux disease)    • History of migraine     botox shots for migraines   • History of pulmonary embolus (PE)    • History of transfusion     autologus blood with back surgery   • Hyperlipidemia    • Hypertension    • IBS (irritable bowel syndrome)    • MRSA infection 2021    MRSA of left knee; IV antibioitcs x 4 weeks and then 6 weeks of PO antibiotics   • PONV (postoperative nausea and vomiting)    • Pyogenic arthritis of left hip (HCC) 2021   • Thyroid nodule     nodules are monitored     Past Surgical History:   Procedure Laterality Date   • APPENDECTOMY     • BACK SURGERY      Daniel removed   • CHOLECYSTECTOMY     • COLONOSCOPY     • ENDOMETRIAL ABLATION     • HYSTERECTOMY     • SPINAL CORD STIMULATOR IMPLANT     • SPINAL FUSION     • THORACIC OUTLET SURGERY      -partial removal of sternum -rib removal   • TOTAL KNEE  PROSTHESIS REMOVAL W/ SPACER INSERTION Left 2022    Procedure: IRRIGATION AND DEBRIDMENT, LEFT KNEE ANTIBIOTIC SPACER PLACEMENT;  Surgeon: Rei Sutherland MD;  Location: [facility];  Service: Orthopedics;  Laterality: Left;      General Information     Row Name  05/07/22 1329 05/07/22 0818       Physical Therapy Time and Intention    Document Type therapy note (daily note)  -AS therapy note (daily note)  -AS    Mode of Treatment physical therapy  -AS physical therapy  -AS    Row Name 05/07/22 1329 05/07/22 0818       General Information    Patient Profile Reviewed yes  -AS yes  -AS    Existing Precautions/Restrictions hip;brace worn when out of bed;other (see comments)  femoral nerve catheter, KI on at all times  -AS hip;brace worn when out of bed;other (see comments)  hemovac, femoral nerve catheter, K.I. on at all times  -AS    Barriers to Rehab medically complex  -AS medically complex  -AS    Row Name 05/07/22 1329 05/07/22 0818       Cognition    Orientation Status (Cognition) oriented x 4  -AS oriented x 4  -AS    Row Name 05/07/22 1329 05/07/22 0818       Safety Issues, Functional Mobility    Safety Issues Affecting Function (Mobility) positioning of assistive device;safety precaution awareness;sequencing abilities  -AS positioning of assistive device;safety precaution awareness;safety precautions follow-through/compliance;sequencing abilities  -AS    Impairments Affecting Function (Mobility) endurance/activity tolerance;strength;pain;range of motion (ROM);balance;postural/trunk control  -AS endurance/activity tolerance;strength;pain;range of motion (ROM);balance;postural/trunk control  -AS    Comment, Safety Issues/Impairments (Mobility) alert and following commands  -AS alert and following commands  -AS          User Key  (r) = Recorded By, (t) = Taken By, (c) = Cosigned By    Initials Name Provider Type    AS Tyra Wynn PTA Physical Therapist Assistant               Mobility     Row Name 05/07/22 1330 05/07/22 0819       Bed Mobility    Supine-Sit [place] (Bed Mobility) -- supervision  -AS    Sit-Supine [place] (Bed Mobility) -- not tested  -AS    Assistive Device (Bed Mobility) -- head of bed elevated  -AS    Comment, (Bed Mobility) sitting [facility]  pre/post tx  -AS patient demonstrated safe technique  -AS    Row Name 05/07/22 1330 05/07/22 0819       Transfers    Comment, (Transfers) cues to slide L LE out prior to sitting  -AS [place]bla cues to step L LE out when sitting on commode, no cues when going to recliner  -AS    Row Name 05/07/22 1330 05/07/22 0819       Bed-Chair Transfer    Bed-Chair [place] (Transfers) standby assist  -AS verbal cues;contact guard;1 person assist  -AS    Assistive Device (Bed-Chair Transfers) walker, front-wheeled  -AS walker, front-wheeled  -AS    Row Name 05/07/22 1330 05/07/22 0819       Sit-Stand Transfer    Sit-Stand [place] (Transfers) verbal cues;contact guard;1 person assist  -AS verbal cues;contact guard;1 person assist  -AS    Assistive Device (Sit-Stand Transfers) walker, front-wheeled  -AS walker, front-wheeled  -AS    Row Name 05/07/22 1330 05/07/22 0819       Gait/Stairs (Locomotion)    [place] Level (Gait) verbal cues;contact guard  -AS verbal cues;contact guard;1 person assist  -AS    Assistive Device (Gait) walker, front-wheeled  -AS walker, front-wheeled  -AS    Distance in Feet (Gait) 375  -  -AS    Deviations/Abnormal Patterns (Gait) bilateral deviations;kelsey decreased;gait speed decreased;stride length decreased  -AS bilateral deviations;kelsey decreased;gait speed decreased;stride length decreased  -AS    Bilateral Gait Deviations forward flexed posture  -AS forward flexed posture  -AS    Left Sided Gait Deviations weight shift ability decreased;heel strike decreased  -AS weight shift ability decreased;heel strike decreased  -AS    Comment, (Gait/Stairs) patient ambulated 375 feet with CGA x1 and rolling walker for support, cues to take a shorter step with L LE due to increased trunk extension. Patient able to correct with cues. Patient reported and increase in pain this afternoon during mobility.  -AS patient ambulated 350 feet with CGA x1 and rolling walker for suppport, cues to keep  walker close and to improve posture. Patient able to increased gait distance this session without increase in pain.  -AS    Row Name 05/07/22 1330 05/07/22 0819       Mobility    Extremity Weight-bearing Status left lower extremity  -AS left lower extremity  -AS    Left Lower Extremity (Weight-bearing Status) weight-bearing as tolerated (WBAT)  w/ knee immobilizer  -AS weight-bearing as tolerated (WBAT)  w/knee immobilizer  -AS          User Key  (r) = Recorded By, (t) = Taken By, (c) = Cosigned By    Initials Name Provider Type    AS Tyra Wynn PTA Physical Therapist Assistant               Obj/Interventions     Row Name 05/07/22 1335 05/07/22 0822       Motor Skills    Therapeutic Exercise hip;knee;ankle  -AS knee  -AS    Row Name 05/07/22 1335          Hip (Therapeutic Exercise)    Hip (Therapeutic Exercise) AROM (active range of motion)  -AS     Hip AROM (Therapeutic Exercise) bilateral;aBduction;aDduction;supine;10 repetitions  -AS     Row Name 05/07/22 1335 05/07/22 0822       Knee (Therapeutic Exercise)    Knee (Therapeutic Exercise) -- isometric exercises  -AS    Knee Isometrics (Therapeutic Exercise) bilateral;gluteal sets;quad sets;supine;10 repetitions  -AS bilateral;gluteal sets;quad sets;supine;10 repetitions;3 second hold  -AS    Knee Strengthening (Therapeutic Exercise) left;SLR (straight leg raise);supine;10 repetitions  -AS left;SLR (straight leg raise);10 repetitions  -AS    Row Name 05/07/22 1335 05/07/22 0822       Ankle (Therapeutic Exercise)    Ankle (Therapeutic Exercise) AROM (active range of motion)  -AS AROM (active range of motion)  -AS    Ankle AROM (Therapeutic Exercise) bilateral;dorsiflexion;plantarflexion;supine;10 repetitions  -AS bilateral;dorsiflexion;plantarflexion;supine;10 repetitions  -AS    Row Name 05/07/22 1335 05/07/22 0822       Balance    Dynamic Standing Balance verbal cues;contact guard;1-person assist  -AS verbal cues;contact guard;1-person assist  -AS     Position/Device Used, Standing Balance walker, front-wheeled  -AS walker, front-wheeled  -AS          User Key  (r) = Recorded By, (t) = Taken By, (c) = Cosigned By    Initials Name Provider Type    AS Tyra Wynn PTA Physical Therapist Assistant               Goals/Plan    No documentation.                Clinical Impression     Row Name 05/07/22 1336 05/07/22 0822       Pain    Pretreatment Pain Rating 4/10  -AS 4/10  -AS    Posttreatment Pain Rating 5/10  -AS 4/10  -AS    Pain Location - Side/Orientation Left  -AS Left  -AS    Pain Location lower  -AS --    Pain Location - knee  -AS knee  -AS    Pain Intervention(s) Repositioned;Ambulation/increased activity  -AS Repositioned;Ambulation/increased activity  -AS    Row Name 05/07/22 1336 05/07/22 0822       Plan of Care Review    Plan of Care Reviewed With patient  -AS patient  -AS    Progress improving  -AS improving  -AS    Outcome Evaluation patient ambulated 375 feet with CGA x1 and rolling walker for support, cues to take a shorter step with L LE due to increased trunk extension. Patient able to correct with cues. Patient reported and increase in pain this afternoon during mobility. Completed L LE exercises. Assist with SLR.  -AS patient ambulated 350 feet with CGA x1 and rolling walker for suppport, cues to keep walker close and to improve posture. Patient able to increased gait distance this session without increase in pain. Completed isometric exercises and SLR on L LE.  -AS    Row Name 05/07/22 1336 05/07/22 0822       Positioning and Restraints    Pre-Treatment Position sitting in chair/recliner  -AS in bed  -AS    Post Treatment Position chair  -AS chair  -AS    In Chair reclined;call light within reach;encouraged to call for assist;waffle cushion;legs elevated;with brace  -AS reclined;call light within reach;encouraged to call for assist;exit alarm on;waffle cushion;legs elevated;with brace  -AS          User Key  (r) = Recorded By, (t) = Taken  By, (c) = Cosigned By    Initials Name Provider Type    AS Tyra Wynn PTA Physical Therapist Assistant               Outcome Measures     Row Name 05/07/22 1336 05/07/22 0847       How much help from another person do you currently need...    Turning from your back to your side while in flat bed without using bedrails? 4  -AS 4  -GJ    Moving from lying on back to sitting on the side of a flat bed without bedrails? 4  -AS 4  -GJ    Moving to and from a bed to a chair (including a wheelchair)? 3  -AS 3  -GJ    Standing up from a chair using your arms (e.g., wheelchair, bedside chair)? 3  -AS 3  -GJ    Climbing 3-5 steps with a railing? 2  -AS 2  -GJ    To walk in hospital room? 3  -AS 3  -GJ    AM-PAC 6 Clicks Score (PT) 19  -AS 19  -GJ    Highest level of mobility 6 --> Walked 10 steps or more  -AS 6 --> Walked 10 steps or more  -    Row Name 05/07/22 0823          How much help from another person do you currently need...    Turning from your back to your side while in flat bed without using bedrails? 4  -AS     Moving from lying on back to sitting on the side of a flat bed without bedrails? 4  -AS     Moving to and from a bed to a chair (including a wheelchair)? 3  -AS     Standing up from a chair using your arms (e.g., wheelchair, bedside chair)? 3  -AS     Climbing 3-5 steps with a railing? 2  -AS     To walk in hospital room? 3  -AS     AM-PAC 6 Clicks Score (PT) 19  -AS     Highest level of mobility 6 --> Walked 10 steps or more  -AS     Row Name 05/07/22 1336 05/07/22 0823       Functional Assessment    Outcome Measure Options AM-PAC 6 Clicks Basic Mobility (PT)  -AS AM-PAC 6 Clicks Basic Mobility (PT)  -AS          User Key  (r) = Recorded By, (t) = Taken By, (c) = Cosigned By    Initials Name Provider Type    AS Tyra Wynn PTA Physical Therapist Assistant    Valentina Parker RN Registered Nurse                             Physical Therapy Education                 Title: PT OT SLP  Therapies (In Progress)     Topic: Physical Therapy (In Progress)     Point: Mobility training (In Progress)     Learning Progress Summary           Patient Acceptance, E, NR by AS at 5/7/2022 1337    Acceptance, E, NR by AS at 5/7/2022 0823    Eager, E, VU,NR by  at 5/6/2022 1636    Comment: Reviewed safety/technique with transfers, ambulation, HEP, PT POC    Eager, E, VU,NR by SS at 5/6/2022 1626    Comment: Reviewed safety/technique with bed mobility, transfers, ambulation, HEP, KI, WB status, PT POC    Acceptance, E,TB, VU by  at 5/5/2022 1526    Acceptance, E,TB, VU by  at 5/5/2022 0940    Acceptance, E,D, VU by TED at 5/4/2022 1345    Comment: Educated on safe sequencing with bed mobility, ambulatory transfers, and gait. Reviewed HEP and knee precautions.                   Point: Home exercise program (In Progress)     Learning Progress Summary           Patient Acceptance, E, NR by AS at 5/7/2022 1337    Acceptance, E, NR by AS at 5/7/2022 0823    Eager, E, VU,NR by  at 5/6/2022 1636    Comment: Reviewed safety/technique with transfers, ambulation, HEP, PT POC    Eager, E, VU,NR by  at 5/6/2022 1626    Comment: Reviewed safety/technique with bed mobility, transfers, ambulation, HEP, KI, WB status, PT POC    Acceptance, E,TB, VU by  at 5/5/2022 1526    Acceptance, E,TB, VU by  at 5/5/2022 0940    Acceptance, E,D, VU by TED at 5/4/2022 1345    Comment: Educated on safe sequencing with bed mobility, ambulatory transfers, and gait. Reviewed HEP and knee precautions.                   Point: Body mechanics (In Progress)     Learning Progress Summary           Patient Acceptance, E, NR by AS at 5/7/2022 1337    Acceptance, E, NR by AS at 5/7/2022 0823    Eager, E, VU,NR by  at 5/6/2022 1636    Comment: Reviewed safety/technique with transfers, ambulation, HEP, PT POC    Eager, E, VU,NR by  at 5/6/2022 1626    Comment: Reviewed safety/technique with bed mobility, transfers, ambulation, HEP, KI, WB  status, PT POC    Acceptance, E,TB, VU by  at 5/5/2022 1526    Acceptance, E,TB, VU by  at 5/5/2022 0940    Acceptance, E,D, VU by  at 5/4/2022 1345    Comment: Educated on safe sequencing with bed mobility, ambulatory transfers, and gait. Reviewed HEP and knee precautions.                   Point: Precautions (In Progress)     Learning Progress Summary           Patient Acceptance, E, NR by AS at 5/7/2022 1337    Acceptance, E, NR by AS at 5/7/2022 0823    Eager, E, VU,NR by  at 5/6/2022 1636    Comment: Reviewed safety/technique with transfers, ambulation, HEP, PT POC    Eager, E, VU,NR by  at 5/6/2022 1626    Comment: Reviewed safety/technique with bed mobility, transfers, ambulation, HEP, KI, WB status, PT POC    Acceptance, E,TB, VU by  at 5/5/2022 1526    Acceptance, E,TB, VU by  at 5/5/2022 0940    Acceptance, E,D, VU by  at 5/4/2022 1345    Comment: Educated on safe sequencing with bed mobility, ambulatory transfers, and gait. Reviewed HEP and knee precautions.                               User Key     Initials Effective Dates Name Provider Type Discipline    AS 06/16/21 -  Tyra Wynn, PTA Physical Therapist Assistant PT     06/16/21 -  Lev Harper, PT Physical Therapist PT     09/22/20 -  Reinaldo Bridges, PT Physical Therapist PT     06/01/21 -  Ludy Perez, PT Physical Therapist PT              PT Recommendation and Plan     Plan of Care Reviewed With: patient  Progress: improving  Outcome Evaluation: patient ambulated 375 feet with CGA x1 and rolling walker for support, cues to take a shorter step with L LE due to increased trunk extension. Patient able to correct with cues. Patient reported and increase in pain this afternoon during mobility. Completed L LE exercises. Assist with SLR.     Time Calculation:    PT Charges     Row Name 05/07/22 1337 05/07/22 0824          Time Calculation    Start Time 1300  -AS 0750  -AS     PT Received On 05/07/22  -AS 05/07/22  -AS     PT  Goal Re-Cert Due Date 05/14/22  -AS 05/14/22  -AS            Timed Charges    25855 - PT Therapeutic Exercise Minutes 10  -AS 10  -AS     53845 - Gait Training Minutes  15  -AS 15  -AS            Total Minutes    Timed Charges Total Minutes 25  -AS 25  -AS      Total Minutes 25  -AS 25  -AS           User Key  (r) = Recorded By, (t) = Taken By, (c) = Cosigned By    Initials Name Provider Type    AS Tyra Wynn PTA Physical Therapist Assistant              Therapy Charges for Today     Code Description Service Date Service Provider Modifiers Qty    73977690452 HC PT THER PROC EA 15 MIN 5/7/2022 Tyra Wynn, PTA GP 1    17433226710 HC GAIT TRAINING EA 15 MIN 5/7/2022 Tyra Wynn, PTA GP 1    98313895215 HC PT THER PROC EA 15 MIN 5/7/2022 Tyra Wynn, PTA GP 1    39723852543 HC GAIT TRAINING EA 15 MIN 5/7/2022 Tyra Wynn, PTA GP 1          PT G-Codes  Outcome Measure Options: AM-PAC 6 Clicks Basic Mobility (PT)  AM-PAC 6 Clicks Score (PT): 19    Tyra Wynn PTA  5/7/2022

## 2022-05-08 LAB
ALBUMIN SERPL-MCNC: 3.2 G/DL (ref 3.5–5.2)
ALBUMIN/GLOB SERPL: 1.2 G/DL
ALP SERPL-CCNC: 104 U/L (ref 39–117)
ALT SERPL W P-5'-P-CCNC: 87 U/L (ref 1–33)
ANION GAP SERPL CALCULATED.3IONS-SCNC: 9 MMOL/L (ref 5–15)
AST SERPL-CCNC: 76 U/L (ref 1–32)
BASOPHILS # BLD AUTO: 0.05 10*3/MM3 (ref 0–0.2)
BASOPHILS NFR BLD AUTO: 0.9 % (ref 0–1.5)
BILIRUB SERPL-MCNC: 0.6 MG/DL (ref 0–1.2)
BUN SERPL-MCNC: 16 MG/DL (ref 6–20)
BUN/CREAT SERPL: 17.6 (ref 7–25)
CALCIUM SPEC-SCNC: 8.9 MG/DL (ref 8.6–10.5)
CHLORIDE SERPL-SCNC: 105 MMOL/L (ref 98–107)
CK SERPL-CCNC: 303 U/L (ref 20–180)
CO2 SERPL-SCNC: 25 MMOL/L (ref 22–29)
CREAT SERPL-MCNC: 0.91 MG/DL (ref 0.57–1)
DEPRECATED RDW RBC AUTO: 43.1 FL (ref 37–54)
EGFRCR SERPLBLD CKD-EPI 2021: 73.3 ML/MIN/1.73
EOSINOPHIL # BLD AUTO: 0.26 10*3/MM3 (ref 0–0.4)
EOSINOPHIL NFR BLD AUTO: 4.5 % (ref 0.3–6.2)
ERYTHROCYTE [DISTWIDTH] IN BLOOD BY AUTOMATED COUNT: 13.1 % (ref 12.3–15.4)
GLOBULIN UR ELPH-MCNC: 2.7 GM/DL
GLUCOSE SERPL-MCNC: 86 MG/DL (ref 65–99)
HCT VFR BLD AUTO: 29.3 % (ref 34–46.6)
HGB BLD-MCNC: 10.2 G/DL (ref 12–15.9)
IMM GRANULOCYTES # BLD AUTO: 0.01 10*3/MM3 (ref 0–0.05)
IMM GRANULOCYTES NFR BLD AUTO: 0.2 % (ref 0–0.5)
INR PPP: 1.77 (ref 0.84–1.13)
LYMPHOCYTES # BLD AUTO: 1.42 10*3/MM3 (ref 0.7–3.1)
LYMPHOCYTES NFR BLD AUTO: 24.8 % (ref 19.6–45.3)
MCH RBC QN AUTO: 31.4 PG (ref 26.6–33)
MCHC RBC AUTO-ENTMCNC: 34.8 G/DL (ref 31.5–35.7)
MCV RBC AUTO: 90.2 FL (ref 79–97)
MONOCYTES # BLD AUTO: 0.42 10*3/MM3 (ref 0.1–0.9)
MONOCYTES NFR BLD AUTO: 7.3 % (ref 5–12)
NEUTROPHILS NFR BLD AUTO: 3.56 10*3/MM3 (ref 1.7–7)
NEUTROPHILS NFR BLD AUTO: 62.3 % (ref 42.7–76)
NRBC BLD AUTO-RTO: 0 /100 WBC (ref 0–0.2)
PLATELET # BLD AUTO: 206 10*3/MM3 (ref 140–450)
PMV BLD AUTO: 11 FL (ref 6–12)
POTASSIUM SERPL-SCNC: 3.6 MMOL/L (ref 3.5–5.2)
PROT SERPL-MCNC: 5.9 G/DL (ref 6–8.5)
PROTHROMBIN TIME: 20.6 SECONDS (ref 11.4–14.4)
RBC # BLD AUTO: 3.25 10*6/MM3 (ref 3.77–5.28)
SODIUM SERPL-SCNC: 139 MMOL/L (ref 136–145)
WBC NRBC COR # BLD: 5.72 10*3/MM3 (ref 3.4–10.8)

## 2022-05-08 PROCEDURE — 82550 ASSAY OF CK (CPK): CPT | Performed by: INTERNAL MEDICINE

## 2022-05-08 PROCEDURE — 85610 PROTHROMBIN TIME: CPT

## 2022-05-08 PROCEDURE — 97530 THERAPEUTIC ACTIVITIES: CPT

## 2022-05-08 PROCEDURE — 80053 COMPREHEN METABOLIC PANEL: CPT | Performed by: INTERNAL MEDICINE

## 2022-05-08 PROCEDURE — 85025 COMPLETE CBC W/AUTO DIFF WBC: CPT | Performed by: INTERNAL MEDICINE

## 2022-05-08 PROCEDURE — 25010000002 DAPTOMYCIN PER 1 MG: Performed by: INTERNAL MEDICINE

## 2022-05-08 PROCEDURE — 97116 GAIT TRAINING THERAPY: CPT

## 2022-05-08 RX ORDER — WARFARIN SODIUM 7.5 MG/1
7.5 TABLET ORAL
Status: DISCONTINUED | OUTPATIENT
Start: 2022-05-08 | End: 2022-05-09

## 2022-05-08 RX ADMIN — Medication 10 ML: at 08:19

## 2022-05-08 RX ADMIN — GABAPENTIN 300 MG: 300 CAPSULE ORAL at 08:13

## 2022-05-08 RX ADMIN — LEVOTHYROXINE SODIUM 100 MCG: 0.1 TABLET ORAL at 05:24

## 2022-05-08 RX ADMIN — BACLOFEN 10 MG: 10 TABLET ORAL at 16:44

## 2022-05-08 RX ADMIN — GABAPENTIN 300 MG: 300 CAPSULE ORAL at 20:08

## 2022-05-08 RX ADMIN — Medication 10 ML: at 20:09

## 2022-05-08 RX ADMIN — GABAPENTIN 300 MG: 300 CAPSULE ORAL at 16:44

## 2022-05-08 RX ADMIN — WARFARIN SODIUM 7.5 MG: 7.5 TABLET ORAL at 17:14

## 2022-05-08 RX ADMIN — DAPTOMYCIN 500 MG: 500 INJECTION, POWDER, LYOPHILIZED, FOR SOLUTION INTRAVENOUS at 11:37

## 2022-05-08 RX ADMIN — MIDODRINE HYDROCHLORIDE 5 MG: 5 TABLET ORAL at 11:37

## 2022-05-08 RX ADMIN — OXYCODONE 5 MG: 5 TABLET ORAL at 13:03

## 2022-05-08 RX ADMIN — MIDODRINE HYDROCHLORIDE 5 MG: 5 TABLET ORAL at 16:43

## 2022-05-08 RX ADMIN — ACETAMINOPHEN 1000 MG: 500 TABLET ORAL at 05:23

## 2022-05-08 RX ADMIN — MAGNESIUM HYDROXIDE 10 ML: 2400 SUSPENSION ORAL at 16:43

## 2022-05-08 RX ADMIN — BACLOFEN 10 MG: 10 TABLET ORAL at 20:08

## 2022-05-08 RX ADMIN — POLYETHYLENE GLYCOL 3350 17 G: 17 POWDER, FOR SOLUTION ORAL at 08:13

## 2022-05-08 RX ADMIN — BACLOFEN 10 MG: 10 TABLET ORAL at 08:14

## 2022-05-08 RX ADMIN — FAMOTIDINE 20 MG: 20 TABLET ORAL at 08:13

## 2022-05-08 RX ADMIN — SERTRALINE 100 MG: 100 TABLET, FILM COATED ORAL at 08:13

## 2022-05-08 RX ADMIN — MIDODRINE HYDROCHLORIDE 5 MG: 5 TABLET ORAL at 08:14

## 2022-05-08 RX ADMIN — MELOXICAM 15 MG: 7.5 TABLET ORAL at 08:14

## 2022-05-08 RX ADMIN — AMITRIPTYLINE HYDROCHLORIDE 50 MG: 50 TABLET, FILM COATED ORAL at 20:08

## 2022-05-08 NOTE — PLAN OF CARE
Goal Outcome Evaluation:  Plan of Care Reviewed With: patient        Progress: improving  Outcome Evaluation: Pt increased distance ambulated this session with good balance and safety awareness noted. KI donned for all mobility. Continue with PT POC as pt tolerates.

## 2022-05-08 NOTE — PROGRESS NOTES
INFECTIOUS DISEASE Progress Note    Corrina Reis  1965  [id number]    Consult: 5/5/22  Admit date: 5/4/2022    Requesting Provider: Rei Sutherland MD  Evaluating physician: Reji Ascencio MD  Reason for Consultation: MRSA septic left knee  Chief Complaint: Above      Subjective   History of present illness:  Patient is a very pleasant  56 y.o.  Yr old female with a history of borderline diabetes mellitus, anxiety, DVT in the past, GERD, spinal cord stimulator for chronic pain (2012), who fell in April 2021 with left knee injury and subsequent MRSA infection treated with arthroscopic washout in April 2021.  Cultures reported positive for MRSA in the knee and in blood and she was treated with about 6 weeks of IV vancomycin (until around 6/10/2021 per Dr. Evaristo Figueroa, infectious disease at [facility]), followed by 6 weeks of doxycycline.  The patient had progressive left knee pain with inability to ambulate and was evaluated by Dr. Rei Bee who aspirated left knee with reported fluid showing 147 WBCs, 41% PMNs.  CT scan without evidence of abscess or sequestrum.  The patient was taken to the operating room after admission to [facility] on 5/4/2022, where she was found to have extensive injuries including minimal cartilage, extensive osteophytes.  Antibiotic spacer was placed.  I was consulted on 5/5/2022 for further evaluation and treatment.  No other localizing signs or symptoms of infection.    5/6/2022 history reviewed.  No high fevers or chills.  Still has a little bit of left knee pain.  Tolerating daptomycin until 6/16/2022.  Left knee culture negative to date.  May be looking at transferring to skilled facility.    5/7/2022 history reviewed.  Knee pain controlled.  On daptomycin until 6/16/2022 for left knee MRSA infection.  Status postdebridement and antibiotic spacer placed 5/4/2022.  Awaits knee replacement in 8 to 10 weeks.    5/8/2022 history  reviewed.  On daptomycin until 6/16 for left knee MRSA infection.  No muscle pain or fever.    Past Medical History:   Diagnosis Date   • Anxiety    • Borderline diabetes     daily at home checks   • Deep vein thrombosis (HCC)     left leg 2001 and 2014   • GERD (gastroesophageal reflux disease)    • History of migraine     botox shots for migraines   • History of pulmonary embolus (PE) 2014   • History of transfusion     autologus blood with back surgery   • Hyperlipidemia    • Hypertension    • IBS (irritable bowel syndrome)    • MRSA infection 04/2021    MRSA of left knee; IV antibioitcs x 4 weeks and then 6 weeks of PO antibiotics   • PONV (postoperative nausea and vomiting)    • Pyogenic arthritis of left hip (HCC) 04/2021   • Thyroid nodule     nodules are monitored       Past Surgical History:   Procedure Laterality Date   • APPENDECTOMY  1978   • BACK SURGERY      Daniel removed   • CHOLECYSTECTOMY  1997   • COLONOSCOPY     • ENDOMETRIAL ABLATION     • HYSTERECTOMY  1998   • SPINAL CORD STIMULATOR IMPLANT  2013   • SPINAL FUSION  1978   • THORACIC OUTLET SURGERY      1994-partial removal of sternum 1995-rib removal   • TOTAL KNEE  PROSTHESIS REMOVAL W/ SPACER INSERTION Left 5/4/2022    Procedure: IRRIGATION AND DEBRIDMENT, LEFT KNEE ANTIBIOTIC SPACER PLACEMENT;  Surgeon: Rei Sutherland MD;  [facility];  Service: Orthopedics;  Laterality: Left;       Pediatric History   Patient Parents   • Not on file     Other Topics Concern   • Not on file   Social History Narrative   • Not on file       family history includes Atrial fibrillation in her mother; Autoimmune disease in her sister; Diabetes in her brother, mother, and sister; Graves' disease in her brother and sister; Heart attack in her brother; Heart disease in her mother; Hypertension in her brother, brother, and sister; Lung cancer in her brother; No Known Problems in her father and sister; Rectal cancer in her brother.    Allergies   Allergen  Reactions   • Msg [Monosodium Glutamate] Nausea And Vomiting and Headache     Numbness around mouth and eyes   • Orange Nausea And Vomiting and Headache     Numbness around mouth and eyes   • Penicillins Hives   • Sulfa Antibiotics Hives       Immunization History   Administered Date(s) Administered   • COVID-19 (MODERNA) 1st, 2nd, 3rd Dose Only 03/19/2021, 05/12/2021       Medication:    Current Facility-Administered Medications:   •  amitriptyline (ELAVIL) tablet 50 mg, 50 mg, Oral, Nightly, Gillian Hurtado, APRN, 50 mg at 05/07/22 2058  •  baclofen (LIORESAL) tablet 10 mg, 10 mg, Oral, TID, Gillian Hurtado, APRN, 10 mg at 05/08/22 0814  •  bisacodyl (DULCOLAX) suppository 10 mg, 10 mg, Rectal, Daily PRN, Zoila Benites MD  •  cyclobenzaprine (FLEXERIL) tablet 5 mg, 5 mg, Oral, TID PRN, Gillian Hurtado APRN  •  DAPTOmycin (CUBICIN) 500 mg in sodium chloride 0.9 % 50 mL IVPB, 6 mg/kg (Adjusted), Intravenous, Q24H, Reji Ascencio MD, Last Rate: 100 mL/hr at 05/07/22 1121, 500 mg at 05/07/22 1121  •  diphenhydrAMINE (BENADRYL) capsule 25 mg, 25 mg, Oral, Q6H PRN **OR** diphenhydrAMINE (BENADRYL) injection 25 mg, 25 mg, Intravenous, Q6H PRN, Rei Sutherland MD  •  famotidine (PEPCID) tablet 20 mg, 20 mg, Oral, Daily, Gillian Hurtado, APRN, 20 mg at 05/08/22 0813  •  gabapentin (NEURONTIN) capsule 300 mg, 300 mg, Oral, TID, Zoila Benites MD, 300 mg at 05/08/22 0813  •  HYDROmorphone (DILAUDID) injection 0.5 mg, 0.5 mg, Intravenous, Q2H PRN **AND** naloxone (NARCAN) injection 0.1 mg, 0.1 mg, Intravenous, Q5 Min PRN, Rei Sutherland MD  •  ketorolac (TORADOL) injection 15 mg, 15 mg, Intravenous, Q6H PRN, Rei Sutherland MD, 15 mg at 05/06/22 0520  •  labetalol (NORMODYNE,TRANDATE) injection 10 mg, 10 mg, Intravenous, Q4H PRN, Gillian Hurtado APRN  •  lactated ringers infusion, 9 mL/hr, Intravenous, Continuous, Rei Sutherland MD, Stopped at 05/04/22 1306  •  lactated ringers infusion, 100  mL/hr, Intravenous, Continuous, Rei Sutherland MD, Last Rate: 100 mL/hr at 05/05/22 0202, 100 mL/hr at 05/05/22 0202  •  levothyroxine (SYNTHROID, LEVOTHROID) tablet 100 mcg, 100 mcg, Oral, Q AM, Gillian Hurtado APRN, 100 mcg at 05/08/22 0524  •  meloxicam (MOBIC) tablet 15 mg, 15 mg, Oral, Daily, Rei Sutherland MD, 15 mg at 05/08/22 0814  •  midodrine (PROAMATINE) tablet 5 mg, 5 mg, Oral, TID AC, Gillian Hurtado APRN, 5 mg at 05/08/22 0814  •  ondansetron (ZOFRAN) tablet 4 mg, 4 mg, Oral, Q6H PRN **OR** ondansetron (ZOFRAN) injection 4 mg, 4 mg, Intravenous, Q6H PRN, Rei Sutherland MD  •  ondansetron (ZOFRAN) injection 4 mg, 4 mg, Intravenous, Q6H PRN, Gillian Hurtado APRN  •  oxyCODONE (ROXICODONE) immediate release tablet 10 mg, 10 mg, Oral, Q4H PRN, Rei Sutherland MD  •  oxyCODONE (ROXICODONE) immediate release tablet 5 mg, 5 mg, Oral, Q4H PRN, Rei Sutherland MD, 5 mg at 05/06/22 1135  •  Pharmacy to dose warfarin, , Does not apply, Continuous PRN, Rei Sutherland MD  •  polyethylene glycol (MIRALAX) packet 17 g, 17 g, Oral, Daily, Zoila Benites MD, 17 g at 05/08/22 0813  •  ropivacaine (Naropin) 0.2% in NS infusion (ARROW Pump), 10 mL/hr, Peripheral Nerve, Continuous, Rei Sutherland MD, Last Rate: 10 mL/hr at 05/06/22 1600, 10 mL/hr at 05/06/22 1600  •  sertraline (ZOLOFT) tablet 100 mg, 100 mg, Oral, Daily, Gillian Hurtado APRN, 100 mg at 05/08/22 0813  •  sodium chloride 0.9 % bolus 500 mL, 500 mL, Intravenous, TID PRN, Gillian Hurtado APRN  •  sodium chloride 0.9 % flush 10 mL, 10 mL, Intravenous, Q12H, Reji Ascencio MD, 10 mL at 05/08/22 0819  •  sodium chloride 0.9 % flush 10 mL, 10 mL, Intravenous, PRN, Reji Ascencio MD  •  traMADol (ULTRAM) tablet 50 mg, 50 mg, Oral, Q8H PRN, Rei Sutherland MD, 50 mg at 05/06/22 1502  •  warfarin (COUMADIN) tablet 5 mg, 5 mg, Oral, Daily, Caitlin Modi, PharmD, 5 mg at 05/07/22 1723    Please refer to the medical record for a full  "medication list    Review of Systems:    Constitutional-- No Fever, chills or sweats.  Appetite good, and no malaise. No fatigue.  HEENT-- No new vision, hearing or throat complaints.  No epistaxis or oral sores.  Denies odynophagia or dysphagia.  No odynophagia or dysphagia. No headache, photophobia or neck stiffness.  CV-- No chest pain, palpitation or syncope  Resp-- No SOB/cough/Hemoptysis  GI- No nausea, vomiting, or diarrhea.  No hematochezia, melena, or hematemesis. Denies jaundice or chronic liver disease.  -- No dysuria, hematuria, or flank pain.  Denies hesitancy, urgency or flank pain.  Lymph- no swollen lymph nodes in neck/axilla or groin.   Heme- No active bruising or bleeding; no Hx of DVT or PE.  MS-- no swelling or pain in the bones or joints of arms/legs.  No new back pain.  Except left knee pain.  Neuro-- No acute focal weakness or numbness in the arms or legs.  No seizures.  Skin--No rashes or lesions, no nodules    Physical Exam:   Vital Signs   Temp:  [97.8 °F (36.6 °C)-98.4 °F (36.9 °C)] 97.8 °F (36.6 °C)  Heart Rate:  [78-95] 80  Resp:  [16-20] 16  BP: (122-160)/(77-87) 133/82    Temp  Min: 97.8 °F (36.6 °C)  Max: 98.4 °F (36.9 °C)  BP  Min: 122/77  Max: 160/85  Pulse  Min: 78  Max: 95  Resp  Min: 16  Max: 20  SpO2  Min: 95 %  Max: 96 %    Blood pressure 133/82, pulse 80, temperature 97.8 °F (36.6 °C), temperature source Oral, resp. rate 16, height 172.7 cm (67.99\"), weight 105 kg (231 lb 7.7 oz), SpO2 96 %.  GENERAL: Awake and alert, in mild distress. Appears older than stated age.  Resting in bed.  HEENT:  Normocephalic, atraumatic.  Oropharynx without thrush. Dentition in good repair. No cervical adenopathy. No neck masses.  Ears externally normal, Nose externally normal.  EYES:  No conjunctival injection. No icterus. EOM full.  LYMPHATICS: No lymphadenopathy of the neck or axillary or inguinal regions.   HEART: No murmur, gallop, or pericardial friction rub. Reg rate rhythm.  LUNGS: " Clear to auscultation and percussion. No respiratory distress, no use of accessory muscles.  No wheezing.  ABDOMEN: Soft, nontender, nondistended. No appreciable HSM.  Bowel sounds normal.  SKIN: Warm and dry without cutaneous eruptions.  No nodules.  Left knee surgical dressing in place, no drainage   PSYCHIATRIC: Mental status lucid. No confusion.  EXT:  No cellulitic change.  Decreased range of motion left knee.  NEURO: Oriented to name, nonfocal.      Results Review:   I reviewed the patient's new clinical results.  I reviewed the patient's new imaging results and agree with the interpretation.  I reviewed the patient's other test results and agree with the interpretation    Results from last 7 days   Lab Units 05/08/22  0705 05/07/22  0823 05/06/22  0632   WBC 10*3/mm3 5.72 6.36 6.25   HEMOGLOBIN g/dL 10.2* 10.5* 9.8*   HEMATOCRIT % 29.3* 29.8* 29.4*   PLATELETS 10*3/mm3 206 182 171     Results from last 7 days   Lab Units 05/08/22  0705   SODIUM mmol/L 139   POTASSIUM mmol/L 3.6   CHLORIDE mmol/L 105   CO2 mmol/L 25.0   BUN mg/dL 16   CREATININE mg/dL 0.91   GLUCOSE mg/dL 86   CALCIUM mg/dL 8.9     Results from last 7 days   Lab Units 05/08/22  0705   ALK PHOS U/L 104   BILIRUBIN mg/dL 0.6   ALT (SGPT) U/L 87*   AST (SGOT) U/L 76*     Results from last 7 days   Lab Units 05/06/22  0632   SED RATE mm/hr 10     Results from last 7 days   Lab Units 05/06/22  0858   CRP mg/dL 5.02*    on 5/6/2022, 306 on 5/5 prior to daptomycin.  451 on 5/7.          Estimated Creatinine Clearance: 85.4 mL/min (by C-G formula based on SCr of 0.91 mg/dL).    Microbiology:  Microbiology Results Abnormal     Procedure Component Value - Date/Time    Anaerobic Culture 10 Day Incubation - Tissue, Knee, Left [[id number]]  (Normal) Collected: 05/04/22 1113    Lab Status: Preliminary result Specimen: Tissue from Knee, Left Updated: 05/07/22 1148     Anaerobic Culture No anaerobes isolated at 3 days    Anaerobic Culture 10 Day  Incubation - Tissue, Knee, Left [[id number]]  (Normal) Collected: 05/04/22 1114    Lab Status: Preliminary result Specimen: Tissue from Knee, Left Updated: 05/07/22 1148     Anaerobic Culture No anaerobes isolated at 3 days    Anaerobic Culture 10 Day Incubation - Tissue, Knee, Left [[id number]]  (Normal) Collected: 05/04/22 1114    Lab Status: Preliminary result Specimen: Tissue from Knee, Left Updated: 05/07/22 1148     Anaerobic Culture No anaerobes isolated at 3 days    Tissue / Bone Culture - Tissue, Knee, Left [[id number]] Collected: 05/04/22 1114    Lab Status: Final result Specimen: Tissue from Knee, Left Updated: 05/07/22 0644     Tissue Culture No growth at 3 days     Gram Stain No WBCs or organisms seen    Tissue / Bone Culture - Tissue, Knee, Left [[id number]] Collected: 05/04/22 1114    Lab Status: Final result Specimen: Tissue from Knee, Left Updated: 05/07/22 0644     Tissue Culture No growth at 3 days     Gram Stain Rare (1+) WBCs seen      No organisms seen    Tissue / Bone Culture - Tissue, Knee, Left [[id number]] Collected: 05/04/22 1113    Lab Status: Final result Specimen: Tissue from Knee, Left Updated: 05/07/22 0644     Tissue Culture No growth at 3 days     Gram Stain No WBCs or organisms seen    AFB Culture - Tissue, Knee, Left [[id number]] Collected: 05/04/22 1113    Lab Status: Preliminary result Specimen: Tissue from Knee, Left Updated: 05/05/22 1144     AFB Stain No acid fast bacilli seen on concentrated smear          Radiology:  Imaging Results (Last 72 Hours)     ** No results found for the last 72 hours. **          IMPRESSION:     1.  Left knee MRSA infection, previous I&D April 2021 and treated, along with treated MRSA sepsis.  Data deficit.  Extensive injury to left knee resulting in the need for eventual prosthetic knee replacement.  Debrided on 5/4/2022 with antibiotic spacer placed.  Cultures negative to date.  MRSA swab negative.  2.  Anemia, related to acute postoperative blood  loss and chronic disease.  Worse.  3.  Hypocalcemia.  Resolved.  4.  Prediabetes.  5.  History of GERD, DVT, chronic pain with spinal cord stimulator.  6.  Previous history of MRSA sepsis and left knee septic arthritis April 2021.  7.  Mild CPK elevation prior to daptomycin, 451 on 5/7, worse.  Likely from surgery, but may also be Dapto.  303 on 5/8, but improved.  8.  ALT 87, AST 76, likely from medications versus other.    Plan:    1.  Diagnostically, continue to follow patient's physical exam, CBC, CMP, CRP, CPK, cultures obtained from surgery on 5/4/2022.  2.  Therapeutically, continue daptomycin 6 mg/kg IV daily for 6 weeks until approximately 6/16/2022 and reassess.  Hold atorvastatin while on daptomycin.  We will arrange for this as outpatient therapy, or in rehab facility.  3.  Supportive care.    I discussed the patient's findings and my recommendations with patient and nursing staff    Thank you for asking me to see Corrina Reis.  Our group would be pleased to follow this patient over the course of their hospitalization and assist with outpatient antimicrobial therapy, as indicated.  Further recommendations depend on the results of the cultures and clinical course.  Side effects of antibiotics discussed.    Case management orders: Please arrange for home antibiotics with [place] [facility] with daptomycin 500 mg IV daily until 6/16/2022.  Check CBC, CMP, CRP, CPK weekly while on IV antibiotics.  Fax orders to [phone], call [phone] with final arrangements. Arrange for follow-up with me by telehealth in 1 week, and in person in 2 weeks.    Reji Ascencio MD  5/8/2022

## 2022-05-08 NOTE — PROGRESS NOTES
"Pharmacy Consult  -  Warfarin    Corrina Reis is a  56 y.o. female   Height - 172.7 cm (67.99\")  Weight - 105 kg (231 lb 7.7 oz)    Consulting Provider: Ortho  Indication: History of DVT/PE  Goal INR: 2.5-3.5  Home Regimen: 3 mg daily     Bridge Therapy: No    Drug-Drug Interactions with current regimen:     Meloxicam - may increase risk of bleeding      Tramadol - may increase risk of bleeding    Warfarin Dosing During Admission:    Date  5/4 5/5 5/6 5/7 5/8       INR  1.35 1.43 1.49  1.68  1.77       Dose  3 mg 5 mg 5 mg  5mg  (7.5mg)         Education Provided:  Warfarin education provided on 5/5 verbally and in writing.  Discussed effects of warfarin, importance of checking INR, drug-drug and drug-food interactions, and signs/symptoms of bleeding and clotting.  Patient verbalized understanding through teach back. Patient notes that she monitors her INR at home every 2 weeks with a goal INR range of 2.5-3.5. All pertinent questions were answered.      Discharge Follow up:   Following Provider - Dr. Abbey Merrill (PCP)   Follow up time range or appointment - 2-3 days after discharge    Labs:    Results from last 7 days   Lab Units 05/08/22  0705 05/07/22  0823 05/07/22  0822 05/06/22  0632 05/05/22  0558 05/04/22  0855 05/02/22  1208   INR  1.77*  --  1.68* 1.49* 1.43* 1.35* 1.52*   APTT seconds  --   --   --   --   --   --  33.8*   HEMOGLOBIN g/dL 10.2* 10.5*  --  9.8* 9.9*  --  13.0   HEMATOCRIT % 29.3* 29.8*  --  29.4* 28.5*  --  39.0     Results from last 7 days   Lab Units 05/08/22  0705 05/07/22  0822 05/06/22  0858   SODIUM mmol/L 139 137 144   POTASSIUM mmol/L 3.6 3.9 4.1   CHLORIDE mmol/L 105 103 109*   CO2 mmol/L 25.0 24.0 27.0   BUN mg/dL 16 13 14   CREATININE mg/dL 0.91 0.94 1.03*   CALCIUM mg/dL 8.9 8.8 8.8   BILIRUBIN mg/dL 0.6 0.6 0.5   ALK PHOS U/L 104 121* 77   ALT (SGPT) U/L 87* 134* 63*   AST (SGOT) U/L 76* 162* 118*   GLUCOSE mg/dL 86 109* 139*     Current dietary intake: 50%-75% " of meals documented in last 24 hours    Diet Order   Procedures    Diet Regular     Assessment/Plan:     Patient's INR is subtherapeutic at 1.77 today (slight increase from 1.68 yesterday).   Goal INR 2.5-3.5. Patient receiving warfarin for hx of 2 DVTs and a PE.  Give a boosted dose of warfarin 7.5 mg today.   Discharge recommendations: take increased dose of warfarin 5 mg today. Follow with PCP for INR recheck in 2-3 days after discharge.  Daily PT/INR ordered.  Monitor signs/symptoms of bleeding, dietary intake, and drug-drug interactions. Make dose adjustments as necessary.  Pharmacy will continue to follow.    Thank you,    Raman Isaacs, PharmD  5/8/2022  09:26 EDT

## 2022-05-08 NOTE — PROGRESS NOTES
"IM progress note      Corrina Reis  [id number]  1965     LOS: 4 days     Attending: Zoila Benites MD    Primary Care Provider: Abbey Merrill MD      Chief Complaint/Reason for visit:  No chief complaint on file.      Subjective   Doing well.  Good pain control.  No nausea, vomiting, or shortness of breath.  No bowel movement yet despite current bowel regimen.    Objective     Visit Vitals  /77   Pulse 79   Temp 97.8 °F (36.6 °C) (Oral)   Resp 16   Ht 172.7 cm (67.99\")   Wt 105 kg (231 lb 7.7 oz)   SpO2 96%   BMI 35.21 kg/m²     Temp (24hrs), Av.2 °F (36.8 °C), Min:97.8 °F (36.6 °C), Max:98.4 °F (36.9 °C)      Intake/Output:    Intake/Output Summary (Last 24 hours) at 2022 1213  Last data filed at 2022 0900  Gross per 24 hour   Intake 730 ml   Output 2000 ml   Net -1270 ml        Physical Therapy:   Progress: improving  Outcome Evaluation: Pt. daciad sit to stand transfer w/contact guard assist. She ambulated 200' w/front wheeled walker, contact guard assist. Gait limited by pain, fatigue. Increased effort required. She tolerated ther-ex well. Will continue to progress as able.    Physical Exam:     General Appearance:    Alert, cooperative, in no acute distress   Head:    Normocephalic, without obvious abnormality, atraumatic    Lungs:     Normal effort, symmetric chest rise,  clear to      auscultation bilaterally              Heart:    Regular rhythm and normal rate, normal S1 and S2    Abdomen:     Normal bowel sounds, no masses, no organomegaly, soft        non-tender, non-distended, no guarding, no rebound                tenderness   Extremities:  Clean dry and intact dressing.  Lianna suction dressing.       Knee immobilizer on.  Intact flexion, dorsiflexion bilateral feet.  No clubbing, cyanosis or edema.  No deformities.    Pulses:   Pulses palpable and equal bilaterally   Skin:   No bleeding, bruising or rash          Results Review:     I reviewed the " patient's new clinical results.   Results from last 7 days   Lab Units 05/08/22  0705 05/07/22  0823 05/06/22  0632   WBC 10*3/mm3 5.72 6.36 6.25   HEMOGLOBIN g/dL 10.2* 10.5* 9.8*   HEMATOCRIT % 29.3* 29.8* 29.4*   PLATELETS 10*3/mm3 206 182 171     Results from last 7 days   Lab Units 05/08/22  0705 05/07/22  0822 05/06/22  0858   SODIUM mmol/L 139 137 144   POTASSIUM mmol/L 3.6 3.9 4.1   CHLORIDE mmol/L 105 103 109*   CO2 mmol/L 25.0 24.0 27.0   BUN mg/dL 16 13 14   CREATININE mg/dL 0.91 0.94 1.03*   CALCIUM mg/dL 8.9 8.8 8.8   BILIRUBIN mg/dL 0.6 0.6 0.5   ALK PHOS U/L 104 121* 77   ALT (SGPT) U/L 87* 134* 63*   AST (SGOT) U/L 76* 162* 118*   GLUCOSE mg/dL 86 109* 139*     I reviewed the patient's new imaging including images and reports.   Latest Reference Range & Units 05/07/22 08:22   Protime 11.4 - 14.4 Seconds 19.8 (H)   INR 0.84 - 1.13  1.68 (H)   (H): Data is abnormally high  All medications reviewed.   amitriptyline, 50 mg, Oral, Nightly  baclofen, 10 mg, Oral, TID  DAPTOmycin, 6 mg/kg (Adjusted), Intravenous, Q24H  famotidine, 20 mg, Oral, Daily  gabapentin, 300 mg, Oral, TID  levothyroxine, 100 mcg, Oral, Q AM  meloxicam, 15 mg, Oral, Daily  midodrine, 5 mg, Oral, TID AC  polyethylene glycol, 17 g, Oral, Daily  sertraline, 100 mg, Oral, Daily  sodium chloride, 10 mL, Intravenous, Q12H  warfarin, 7.5 mg, Oral, Daily      bisacodyl, 10 mg, Daily PRN  cyclobenzaprine, 5 mg, TID PRN  diphenhydrAMINE, 25 mg, Q6H PRN   Or  diphenhydrAMINE, 25 mg, Q6H PRN  HYDROmorphone, 0.5 mg, Q2H PRN   And  naloxone, 0.1 mg, Q5 Min PRN  ketorolac, 15 mg, Q6H PRN  labetalol, 10 mg, Q4H PRN  magnesium hydroxide, 10 mL, Daily PRN  ondansetron, 4 mg, Q6H PRN   Or  ondansetron, 4 mg, Q6H PRN  ondansetron, 4 mg, Q6H PRN  oxyCODONE, 10 mg, Q4H PRN  oxyCODONE, 5 mg, Q4H PRN  Pharmacy to dose warfarin, , Continuous PRN  sodium chloride, 500 mL, TID PRN  sodium chloride, 10 mL, PRN  traMADol, 50 mg, Q8H PRN        Assessment/Plan        S/P irrigation and debridement, left knee antibiotic spacer placement    Autonomic orthostatic hypotension    Infection of left knee (HCC)    Hyperlipidemia    Hypothyroid    History of DVT (deep vein thrombosis)    Chronic anticoagulation    Acute blood loss anemia, mild, asymptomatic  Status post PICC placement 5/6/2022  LFT elevation, improved today     Plan   1. PT/OT- Knee immobilizer for first 2 weeks no knee flexion for the first 2 weeks  2. Pain control-prns , status post PNB cath, ropivacaine.    3. IS-encourage  4. DVT proph- Mechs/coumadin  5. Bowel regimen  6. Resume home medications as appropriate  7. Monitor post-op labs  8. DC planning , patient is interested in rehab facility.   following, referrals made.     Dr. Ascencio with [facility] following for antibiotics.  Follow op cultures.      Hypotension, Hyperlipidemia  - Continue home statin, and midodrine   -Hold HCTZ for now  - Monitor BP   - Holding parameters for BP meds  - Labetalol PRN for SBP>170     Hypothyroid  -Continue home Synthroid     Constipation  -Milk of magnesia today 5/8/2022.  Dulcolax suppository if needed.    Zoila Benites MD  05/08/22  12:13 EDT

## 2022-05-08 NOTE — THERAPY EVALUATION
Patient Name: Corrina Reis  : 1965    MRN: [id number]                              Today's Date: 2022       Admit Date: 2022    Visit Dx:     ICD-10-CM ICD-9-CM   1. S/P irrigation and debridement, left knee antibiotic spacer placement  Z98.890 V45.89   2. Infection of left knee (HCC)  M00.9 686.9     Patient Active Problem List   Diagnosis   • Autonomic orthostatic hypotension   • Infection of left knee (HCC)   • S/P irrigation and debridement, left knee antibiotic spacer placement   • Hyperlipidemia   • Hypothyroid   • History of DVT (deep vein thrombosis)   • Chronic anticoagulation     Past Medical History:   Diagnosis Date   • Anxiety    • Borderline diabetes     daily at home checks   • Deep vein thrombosis (HCC)     left leg  and    • GERD (gastroesophageal reflux disease)    • History of migraine     botox shots for migraines   • History of pulmonary embolus (PE)    • History of transfusion     autologus blood with back surgery   • Hyperlipidemia    • Hypertension    • IBS (irritable bowel syndrome)    • MRSA infection 2021    MRSA of left knee; IV antibioitcs x 4 weeks and then 6 weeks of PO antibiotics   • PONV (postoperative nausea and vomiting)    • Pyogenic arthritis of left hip (HCC) 2021   • Thyroid nodule     nodules are monitored     Past Surgical History:   Procedure Laterality Date   • APPENDECTOMY     • BACK SURGERY      Daniel removed   • CHOLECYSTECTOMY     • COLONOSCOPY     • ENDOMETRIAL ABLATION     • HYSTERECTOMY     • SPINAL CORD STIMULATOR IMPLANT     • SPINAL FUSION     • THORACIC OUTLET SURGERY      -partial removal of sternum -rib removal   • TOTAL KNEE  PROSTHESIS REMOVAL W/ SPACER INSERTION Left 2022    Procedure: IRRIGATION AND DEBRIDMENT, LEFT KNEE ANTIBIOTIC SPACER PLACEMENT;  Surgeon: Rei Sutherland MD;  Location: [facility];  Service: Orthopedics;  Laterality: Left;      General Information     Row Name  05/08/22 1548 05/08/22 0924       Physical Therapy Time and Intention    Document Type therapy note (daily note)  - therapy note (daily note)  -    Mode of Treatment physical therapy  - physical therapy  -    Row Name 05/08/22 1548 05/08/22 0924       General Information    Patient Profile Reviewed yes  -HP yes  -    Existing Precautions/Restrictions hip;brace worn when out of bed;other (see comments)   KI on at all times  - hip;brace worn when out of bed;other (see comments)   KI on at all times  -    Row Name 05/08/22 1548 05/08/22 0924       Cognition    Orientation Status (Cognition) oriented x 4  -HP oriented x 4  -HP    Row Name 05/08/22 1548 05/08/22 0924       Safety Issues, Functional Mobility    Impairments Affecting Function (Mobility) endurance/activity tolerance;strength;pain;range of motion (ROM);balance;postural/trunk control  - endurance/activity tolerance;strength;pain;range of motion (ROM);balance;postural/trunk control  -          User Key  (r) = Recorded By, (t) = Taken By, (c) = Cosigned By    Initials Name Provider Type     Mona Sanchez, COLTON Physical Therapist               Mobility     Row Name 05/08/22 1548 05/08/22 0924       Bed Mobility    Bed Mobility -- scooting/bridging;supine-sit  -    Scooting/Bridging Rhodelia (Bed Mobility) -- modified independence  -    Supine-Sit Rhodelia (Bed Mobility) -- modified independence  -    Assistive Device (Bed Mobility) -- head of bed elevated  -    Comment, (Bed Mobility) pt [facility]  - No assist needed  -    Row Name 05/08/22 0924          Transfers    Comment, (Transfers) No safety concerns noted  -     Row Name 05/08/22 1548 05/08/22 0924       Sit-Stand Transfer    Sit-Stand Rhodelia (Transfers) modified independence  - standby assist  -    Assistive Device (Sit-Stand Transfers) walker, front-wheeled  - walker, front-wheeled  -    Row Name 05/08/22 1548 05/08/22 0924       Gait/Stairs (Locomotion)     [place] Level (Gait) standby assist  -HP standby assist  -    Assistive Device (Gait) walker, front-wheeled  -HP walker, front-wheeled  -HP    Distance in Feet (Gait) 350  -  -HP    Deviations/Abnormal Patterns (Gait) bilateral deviations;kelsey decreased;gait speed decreased;stride length decreased  -HP bilateral deviations;kesley decreased;gait speed decreased;stride length decreased  -HP    Bilateral Gait Deviations forward flexed posture  -HP forward flexed posture  -    Left Sided Gait Deviations weight shift ability decreased;heel strike decreased  -HP weight shift ability decreased;heel strike decreased  -HP    [place] Level (Stairs) not tested  - not tested  -HP    Comment, (Gait/Stairs) Pt amb 350' with FWW and SBA. No safety concerns noted. Activity limited by elevated pain.  -HP Pt amb 700' with FWW and SBA. Pt demonstrated good safety awareness and balance with no unsteadiness noted. Decreased gait speed observed with forward flexed posture and lateral lean onto LLE in stance phase. VC with moderate correction.  -    Row Name 05/08/22 1548 05/08/22 0924       Mobility    Extremity Weight-bearing Status left lower extremity  - left lower extremity  -    Left Lower Extremity (Weight-bearing Status) weight-bearing as tolerated (WBAT)  -HP weight-bearing as tolerated (WBAT)  KI donned at all times  -          User Key  (r) = Recorded By, (t) = Taken By, (c) = Cosigned By    Initials Name Provider Type     Mona Sanchez PT Physical Therapist               Obj/Interventions     Row Name 05/08/22 0927          Balance    Balance Assessment sitting static balance;sitting dynamic balance;standing static balance;sit to stand dynamic balance;standing dynamic balance  -     Static Sitting Balance independent  -     Dynamic Sitting Balance independent  -     Position, Sitting Balance unsupported;sitting edge of bed  -     Static Standing Balance modified independence   -HP     Dynamic Standing Balance standby assist  -     Position/Device Used, Standing Balance supported;walker, rolling  -HP     Balance Interventions sitting;standing;sit to stand;occupation based/functional task  -     Comment, Balance no balance concerns observed  -           User Key  (r) = Recorded By, (t) = Taken By, (c) = Cosigned By    Initials Name Provider Type    HP Mona Sanchez, PT Physical Therapist               Goals/Plan    No documentation.                Clinical Impression     Row Name 05/08/22 1549 05/08/22 0928       Pain    Pretreatment Pain Rating 3/10  - 2/10  -HP    Posttreatment Pain Rating 3/10  - 2/10  -HP    Pain Location - Side/Orientation Left  - Left  -    Pain Location generalized  -HP generalized  -    Pain Location - knee  - knee  -    Pain Intervention(s) Cold applied;Elevated;Ambulation/increased activity  - Repositioned;Cold applied;Ambulation/increased activity  -    Row Name 05/08/22 1549 05/08/22 0928       Plan of Care Review    Plan of Care Reviewed With patient  -HP patient  -HP    Progress no change  - improving  -    Outcome Evaluation Pt amb 350' with FWW and SBA. Good safety awareness and balance noted. Activity limited by elevated pain. Continue with PT POC as pt tolerates.  - Pt increased distance ambulated this session with good balance and safety awareness noted. KI donned for all mobility. Continue with PT POC as pt tolerates.  -    Row Name 05/08/22 0928          Therapy Assessment/Plan (PT)    Rehab Potential (PT) good, to achieve stated therapy goals  -     Criteria for Skilled Interventions Met (PT) yes;meets criteria;skilled treatment is necessary  -     Therapy Frequency (PT) 2 times/day  -     Row Name 05/08/22 1549 05/08/22 0928       Vital Signs    Pre Systolic BP Rehab --  VSS; RN cleared for therapy  -HP --  VSS; RN cleared for therapy  -HP    Pre Patient Position Sitting  -HP Supine  -    Intra Patient Position  Standing  -HP Standing  -HP    Post Patient Position Sitting  -HP Sitting  -HP    Row Name 05/08/22 1549 05/08/22 0928       Positioning and Restraints    Pre-Treatment Position sitting in chair/recliner  -HP in bed  -HP    Post Treatment Position chair  -HP chair  -HP    In Chair notified nsg;reclined;sitting;call light within reach;encouraged to call for assist;exit alarm on;legs elevated  -HP notified nsg;reclined;sitting;call light within reach;encouraged to call for assist;exit alarm on;with family/caregiver;legs elevated  -HP          User Key  (r) = Recorded By, (t) = Taken By, (c) = Cosigned By    Initials Name Provider Type    Mona Louise PT Physical Therapist               Outcome Measures     Row Name 05/08/22 1551 05/08/22 0929       How much help from another person do you currently need...    Turning from your back to your side while in flat bed without using bedrails? 4  -HP 4  -HP    Moving from lying on back to sitting on the side of a flat bed without bedrails? 4  -HP 4  -HP    Moving to and from a bed to a chair (including a wheelchair)? 4  -HP 4  -HP    Standing up from a chair using your arms (e.g., wheelchair, bedside chair)? 4  -HP 4  -HP    Climbing 3-5 steps with a railing? 3  -HP 3  -HP    To walk in hospital room? 4  -HP 4  -HP    AM-PAC 6 Clicks Score (PT) 23  -HP 23  -HP    Highest level of mobility 7 --> Walked 25 feet or more  -HP 7 --> Walked 25 feet or more  -    Row Name 05/08/22 1551 05/08/22 0929       Functional Assessment    Outcome Measure Options AM-PAC 6 Clicks Basic Mobility (PT)  -HP AM-PAC 6 Clicks Basic Mobility (PT)  -HP          User Key  (r) = Recorded By, (t) = Taken By, (c) = Cosigned By    Initials Name Provider Type    Mona Louise PT Physical Therapist                             Physical Therapy Education                 Title: PT OT SLP Therapies (Done)     Topic: Physical Therapy (Done)     Point: Mobility training (Done)     Learning Progress  Summary           Patient Acceptance, E,D, VU,DU by  at 5/8/2022 0930    Acceptance, E, NR by AS at 5/7/2022 1337    Acceptance, E, NR by AS at 5/7/2022 0823    Eager, E, VU,NR by SS at 5/6/2022 1636    Comment: Reviewed safety/technique with transfers, ambulation, HEP, PT POC    Eager, E, VU,NR by SS at 5/6/2022 1626    Comment: Reviewed safety/technique with bed mobility, transfers, ambulation, HEP, KI, WB status, PT POC    Acceptance, E,TB, VU by  at 5/5/2022 1526    Acceptance, E,TB, VU by  at 5/5/2022 0940    Acceptance, E,D, VU by TED at 5/4/2022 1345    Comment: Educated on safe sequencing with bed mobility, ambulatory transfers, and gait. Reviewed HEP and knee precautions.                   Point: Home exercise program (Done)     Learning Progress Summary           Patient Acceptance, E,D, VU,DU by  at 5/8/2022 0930    Acceptance, E, NR by AS at 5/7/2022 1337    Acceptance, E, NR by AS at 5/7/2022 0823    Eager, E, VU,NR by  at 5/6/2022 1636    Comment: Reviewed safety/technique with transfers, ambulation, HEP, PT POC    Eager, E, VU,NR by SS at 5/6/2022 1626    Comment: Reviewed safety/technique with bed mobility, transfers, ambulation, HEP, KI, WB status, PT POC    Acceptance, E,TB, VU by  at 5/5/2022 1526    Acceptance, E,TB, VU by  at 5/5/2022 0940    Acceptance, E,D, VU by TED at 5/4/2022 1345    Comment: Educated on safe sequencing with bed mobility, ambulatory transfers, and gait. Reviewed HEP and knee precautions.                   Point: Body mechanics (Done)     Learning Progress Summary           Patient Acceptance, E,D, VU,DU by  at 5/8/2022 0930    Acceptance, E, NR by AS at 5/7/2022 1337    Acceptance, E, NR by AS at 5/7/2022 0823    Eager, E, VU,NR by SS at 5/6/2022 1636    Comment: Reviewed safety/technique with transfers, ambulation, HEP, PT POC    HERMELINDA Roach, VU,NR by SS at 5/6/2022 1626    Comment: Reviewed safety/technique with bed mobility, transfers, ambulation, HEP, KI, WB  status, PT POC    Acceptance, E,TB, VU by  at 5/5/2022 1526    Acceptance, E,TB, VU by  at 5/5/2022 0940    Acceptance, E,D, VU by  at 5/4/2022 1345    Comment: Educated on safe sequencing with bed mobility, ambulatory transfers, and gait. Reviewed HEP and knee precautions.                   Point: Precautions (Done)     Learning Progress Summary           Patient Acceptance, E,D, VU,DU by  at 5/8/2022 0930    Acceptance, E, NR by AS at 5/7/2022 1337    Acceptance, E, NR by AS at 5/7/2022 0823    Eager, E, VU,NR by  at 5/6/2022 1636    Comment: Reviewed safety/technique with transfers, ambulation, HEP, PT POC    Eager, E, VU,NR by  at 5/6/2022 1626    Comment: Reviewed safety/technique with bed mobility, transfers, ambulation, HEP, KI, WB status, PT POC    Acceptance, E,TB, VU by  at 5/5/2022 1526    Acceptance, E,TB, VU by  at 5/5/2022 0940    Acceptance, E,D, VU by  at 5/4/2022 1345    Comment: Educated on safe sequencing with bed mobility, ambulatory transfers, and gait. Reviewed HEP and knee precautions.                               User Key     Initials Effective Dates Name Provider Type Discipline    AS 06/16/21 -  Tyra Wynn, PTA Physical Therapist Assistant PT     06/16/21 -  Lev Harper, PT Physical Therapist PT     09/22/20 -  Reinaldo Bridges, PT Physical Therapist PT     06/01/21 -  Mona Sanchez, PT Physical Therapist PT     06/01/21 -  Ludy Perez, PT Physical Therapist PT              PT Recommendation and Plan     Plan of Care Reviewed With: patient  Progress: no change  Outcome Evaluation: Pt amb 350' with FWW and SBA. Good safety awareness and balance noted. Activity limited by elevated pain. Continue with PT POC as pt tolerates.     Time Calculation:    PT Charges     Row Name 05/08/22 1426 05/08/22 0858          Time Calculation    Start Time 1426  - 0858  -HP     PT Received On 05/08/22  - 05/08/22  -            Time Calculation- PT    Total Timed Code  Minutes- PT 14 minute(s)  -HP 23 minute(s)  -HP            Timed Charges    56884 - Gait Training Minutes  14  -HP 17  -HP     92328 - PT Therapeutic Activity Minutes -- 6  -HP            Total Minutes    Timed Charges Total Minutes 14  -HP 23  -HP      Total Minutes 14  -HP 23  -HP           User Key  (r) = Recorded By, (t) = Taken By, (c) = Cosigned By    Initials Name Provider Type     Mona Sanchez, PT Physical Therapist              Therapy Charges for Today     Code Description Service Date Service Provider Modifiers Qty    40230332051 HC GAIT TRAINING EA 15 MIN 5/8/2022 Mona Sanchez, PT GP 1    20271203709 HC PT THERAPEUTIC ACT EA 15 MIN 5/8/2022 Mona Sanchez, PT GP 1    19215359697 HC GAIT TRAINING EA 15 MIN 5/8/2022 Mona Sanchez, PT GP 1          PT G-Codes  Outcome Measure Options: AM-PAC 6 Clicks Basic Mobility (PT)  AM-PAC 6 Clicks Score (PT): 23    Mona Sanchez PT  5/8/2022

## 2022-05-08 NOTE — THERAPY TREATMENT NOTE
Patient Name: Corrina Reis  : 1965    MRN: [id number]                              Today's Date: 2022       Admit Date: 2022    Visit Dx:     ICD-10-CM ICD-9-CM   1. S/P irrigation and debridement, left knee antibiotic spacer placement  Z98.890 V45.89   2. Infection of left knee (HCC)  M00.9 686.9     Patient Active Problem List   Diagnosis   • Autonomic orthostatic hypotension   • Infection of left knee (HCC)   • S/P irrigation and debridement, left knee antibiotic spacer placement   • Hyperlipidemia   • Hypothyroid   • History of DVT (deep vein thrombosis)   • Chronic anticoagulation     Past Medical History:   Diagnosis Date   • Anxiety    • Borderline diabetes     daily at home checks   • Deep vein thrombosis (HCC)     left leg  and    • GERD (gastroesophageal reflux disease)    • History of migraine     botox shots for migraines   • History of pulmonary embolus (PE)    • History of transfusion     autologus blood with back surgery   • Hyperlipidemia    • Hypertension    • IBS (irritable bowel syndrome)    • MRSA infection 2021    MRSA of left knee; IV antibioitcs x 4 weeks and then 6 weeks of PO antibiotics   • PONV (postoperative nausea and vomiting)    • Pyogenic arthritis of left hip (HCC) 2021   • Thyroid nodule     nodules are monitored     Past Surgical History:   Procedure Laterality Date   • APPENDECTOMY     • BACK SURGERY      Daniel removed   • CHOLECYSTECTOMY     • COLONOSCOPY     • ENDOMETRIAL ABLATION     • HYSTERECTOMY     • SPINAL CORD STIMULATOR IMPLANT     • SPINAL FUSION     • THORACIC OUTLET SURGERY      -partial removal of sternum -rib removal   • TOTAL KNEE  PROSTHESIS REMOVAL W/ SPACER INSERTION Left 2022    Procedure: IRRIGATION AND DEBRIDMENT, LEFT KNEE ANTIBIOTIC SPACER PLACEMENT;  Surgeon: Rei Sutherland MD;  Location: [facility];  Service: Orthopedics;  Laterality: Left;      General Information     Row Name  05/08/22 0924          Physical Therapy Time and Intention    Document Type therapy note (daily note)  -     Mode of Treatment physical therapy  -     Row Name 05/08/22 0924          General Information    Patient Profile Reviewed yes  -     Existing Precautions/Restrictions hip;brace worn when out of bed;other (see comments)   KI on at all times  -     Row Name 05/08/22 0924          Cognition    Orientation Status (Cognition) oriented x 4  -     Row Name 05/08/22 0924          Safety Issues, Functional Mobility    Impairments Affecting Function (Mobility) endurance/activity tolerance;strength;pain;range of motion (ROM);balance;postural/trunk control  -           User Key  (r) = Recorded By, (t) = Taken By, (c) = Cosigned By    Initials Name Provider Type     Mona Sanchez PT Physical Therapist               Mobility     Row Name 05/08/22 0924          Bed Mobility    Bed Mobility scooting/bridging;supine-sit  -     Scooting/Bridging [place] (Bed Mobility) modified independence  -     Supine-Sit [place] (Bed Mobility) modified independence  -     Assistive Device (Bed Mobility) head of bed elevated  -     Comment, (Bed Mobility) No assist needed  -     Row Name 05/08/22 0924          Transfers    Comment, (Transfers) No safety concerns noted  -     Row Name 05/08/22 0924          Sit-Stand Transfer    Sit-Stand [place] (Transfers) standby assist  -     Assistive Device (Sit-Stand Transfers) walker, front-wheeled  -     Row Name 05/08/22 0924          Gait/Stairs (Locomotion)    [place] Level (Gait) standby assist  -     Assistive Device (Gait) walker, front-wheeled  -     Distance in Feet (Gait) 700  -     Deviations/Abnormal Patterns (Gait) bilateral deviations;kelsey decreased;gait speed decreased;stride length decreased  -     Bilateral Gait Deviations forward flexed posture  -     Left Sided Gait Deviations weight shift ability decreased;heel strike  decreased  -     [place] Level (Stairs) not tested  -     Comment, (Gait/Stairs) Pt amb 700' with FWW and SBA. Pt demonstrated good safety awareness and balance with no unsteadiness noted. Decreased gait speed observed with forward flexed posture and lateral lean onto LLE in stance phase. VC with moderate correction.  -     Row Name 05/08/22 0924          Mobility    Extremity Weight-bearing Status left lower extremity  -     Left Lower Extremity (Weight-bearing Status) weight-bearing as tolerated (WBAT)  KI donned at all times  -           User Key  (r) = Recorded By, (t) = Taken By, (c) = Cosigned By    Initials Name Provider Type     Mona Sanchez PT Physical Therapist               Obj/Interventions     Row Name 05/08/22 0927          Balance    Balance Assessment sitting static balance;sitting dynamic balance;standing static balance;sit to stand dynamic balance;standing dynamic balance  -     Static Sitting Balance independent  -     Dynamic Sitting Balance independent  -     Position, Sitting Balance unsupported;sitting edge of bed  -     Static Standing Balance modified independence  -     Dynamic Standing Balance standby assist  -     Position/Device Used, Standing Balance supported;walker, rolling  -     Balance Interventions sitting;standing;sit to stand;occupation based/functional task  -     Comment, Balance no balance concerns observed  -           User Key  (r) = Recorded By, (t) = Taken By, (c) = Cosigned By    Initials Name Provider Type     Mona Sanchez PT Physical Therapist               Goals/Plan    No documentation.                Clinical Impression     Row Name 05/08/22 0928          Pain    Pretreatment Pain Rating 2/10  -     Posttreatment Pain Rating 2/10  -     Pain Location - Side/Orientation Left  -     Pain Location generalized  -     Pain Location - knee  -     Pain Intervention(s) Repositioned;Cold applied;Ambulation/increased  activity  -HP     Row Name 05/08/22 0928          Plan of Care Review    Plan of Care Reviewed With patient  -HP     Progress improving  -HP     Outcome Evaluation Pt increased distance ambulated this session with good balance and safety awareness noted. KI donned for all mobility. Continue with PT POC as pt tolerates.  -     Row Name 05/08/22 0928          Therapy Assessment/Plan (PT)    Rehab Potential (PT) good, to achieve stated therapy goals  -HP     Criteria for Skilled Interventions Met (PT) yes;meets criteria;skilled treatment is necessary  -     Therapy Frequency (PT) 2 times/day  -     Row Name 05/08/22 0928          Vital Signs    Pre Systolic BP Rehab --  VSS; RN cleared for therapy  -HP     Pre Patient Position Supine  -HP     Intra Patient Position Standing  -HP     Post Patient Position Sitting  -     Row Name 05/08/22 0928          Positioning and Restraints    Pre-Treatment Position in bed  -HP     Post Treatment Position chair  -HP     In Chair notified nsg;reclined;sitting;call light within reach;encouraged to call for assist;exit alarm on;with family/caregiver;legs elevated  -           User Key  (r) = Recorded By, (t) = Taken By, (c) = Cosigned By    Initials Name Provider Type    HP Mona Sanchez, PT Physical Therapist               Outcome Measures     Row Name 05/08/22 0929          How much help from another person do you currently need...    Turning from your back to your side while in flat bed without using bedrails? 4  -HP     Moving from lying on back to sitting on the side of a flat bed without bedrails? 4  -HP     Moving to and from a bed to a chair (including a wheelchair)? 4  -HP     Standing up from a chair using your arms (e.g., wheelchair, bedside chair)? 4  -HP     Climbing 3-5 steps with a railing? 3  -HP     To walk in hospital room? 4  -HP     AM-PAC 6 Clicks Score (PT) 23  -HP     Highest level of mobility 7 --> Walked 25 feet or more  -     Row Name 05/08/22  0929          Functional Assessment    Outcome Measure Options AM-PAC 6 Clicks Basic Mobility (PT)  -HP           User Key  (r) = Recorded By, (t) = Taken By, (c) = Cosigned By    Initials Name Provider Type    Mona Louise PT Physical Therapist                             Physical Therapy Education                 Title: PT OT SLP Therapies (Done)     Topic: Physical Therapy (Done)     Point: Mobility training (Done)     Learning Progress Summary           Patient Acceptance, E,D, VU,DU by  at 5/8/2022 0930    Acceptance, E, NR by AS at 5/7/2022 1337    Acceptance, E, NR by AS at 5/7/2022 0823    Eager, E, VU,NR by  at 5/6/2022 1636    Comment: Reviewed safety/technique with transfers, ambulation, HEP, PT POC    Eager, E, VU,NR by  at 5/6/2022 1626    Comment: Reviewed safety/technique with bed mobility, transfers, ambulation, HEP, KI, WB status, PT POC    Acceptance, E,TB, VU by  at 5/5/2022 1526    Acceptance, E,TB, VU by  at 5/5/2022 0940    Acceptance, E,D, VU by TED at 5/4/2022 1345    Comment: Educated on safe sequencing with bed mobility, ambulatory transfers, and gait. Reviewed HEP and knee precautions.                   Point: Home exercise program (Done)     Learning Progress Summary           Patient Acceptance, E,D, VU,DU by  at 5/8/2022 0930    Acceptance, E, NR by AS at 5/7/2022 1337    Acceptance, E, NR by AS at 5/7/2022 0823    Eager, E, VU,NR by  at 5/6/2022 1636    Comment: Reviewed safety/technique with transfers, ambulation, HEP, PT POC    Eager, E, VU,NR by  at 5/6/2022 1626    Comment: Reviewed safety/technique with bed mobility, transfers, ambulation, HEP, KI, WB status, PT POC    Acceptance, E,TB, VU by  at 5/5/2022 1526    Acceptance, E,TB, VU by  at 5/5/2022 0940    Acceptance, E,D, VU by TED at 5/4/2022 1345    Comment: Educated on safe sequencing with bed mobility, ambulatory transfers, and gait. Reviewed HEP and knee precautions.                   Point: Body  mechanics (Done)     Learning Progress Summary           Patient Acceptance, E,D, VU,DU by  at 5/8/2022 0930    Acceptance, E, NR by AS at 5/7/2022 1337    Acceptance, E, NR by AS at 5/7/2022 0823    Eager, E, VU,NR by  at 5/6/2022 1636    Comment: Reviewed safety/technique with transfers, ambulation, HEP, PT POC    Eager, E, VU,NR by SS at 5/6/2022 1626    Comment: Reviewed safety/technique with bed mobility, transfers, ambulation, HEP, KI, WB status, PT POC    Acceptance, E,TB, VU by  at 5/5/2022 1526    Acceptance, E,TB, VU by  at 5/5/2022 0940    Acceptance, E,D, VU by  at 5/4/2022 1345    Comment: Educated on safe sequencing with bed mobility, ambulatory transfers, and gait. Reviewed HEP and knee precautions.                   Point: Precautions (Done)     Learning Progress Summary           Patient Acceptance, E,D, VU,DU by HP at 5/8/2022 0930    Acceptance, E, NR by AS at 5/7/2022 1337    Acceptance, E, NR by AS at 5/7/2022 0823    Eager, E, VU,NR by  at 5/6/2022 1636    Comment: Reviewed safety/technique with transfers, ambulation, HEP, PT POC    Eager, E, VU,NR by  at 5/6/2022 1626    Comment: Reviewed safety/technique with bed mobility, transfers, ambulation, HEP, KI, WB status, PT POC    Acceptance, E,TB, VU by  at 5/5/2022 1526    Acceptance, E,TB, VU by  at 5/5/2022 0940    Acceptance, E,D, VU by TED at 5/4/2022 1345    Comment: Educated on safe sequencing with bed mobility, ambulatory transfers, and gait. Reviewed HEP and knee precautions.                               User Key     Initials Effective Dates Name Provider Type Discipline    AS 06/16/21 -  Tyra Wynn, PTA Physical Therapist Assistant PT    TED 06/16/21 -  Lev Harper, PT Physical Therapist PT     09/22/20 -  Reinaldo Bridges, PT Physical Therapist PT     06/01/21 -  Mona Sanchez, PT Physical Therapist PT    SS 06/01/21 -  Ludy Perez, PT Physical Therapist PT              PT Recommendation and Plan     Plan  of Care Reviewed With: patient  Progress: improving  Outcome Evaluation: Pt increased distance ambulated this session with good balance and safety awareness noted. KI donned for all mobility. Continue with PT POC as pt tolerates.     Time Calculation:    PT Charges     Row Name 05/08/22 0858             Time Calculation    Start Time 0858  -HP      PT Received On 05/08/22  -HP              Time Calculation- PT    Total Timed Code Minutes- PT 23 minute(s)  -HP              Timed Charges    39398 - Gait Training Minutes  17  -HP      10239 - PT Therapeutic Activity Minutes 6  -HP              Total Minutes    Timed Charges Total Minutes 23  -HP       Total Minutes 23  -HP            User Key  (r) = Recorded By, (t) = Taken By, (c) = Cosigned By    Initials Name Provider Type    HP Mona Sanchez, PT Physical Therapist              Therapy Charges for Today     Code Description Service Date Service Provider Modifiers Qty    24342953321 HC GAIT TRAINING EA 15 MIN 5/8/2022 Mona Sanchez, PT GP 1    [id number]  PT THERAPEUTIC ACT EA 15 MIN 5/8/2022 Mona Sanchez, PT GP 1          PT G-Codes  Outcome Measure Options: AM-PAC 6 Clicks Basic Mobility (PT)  AM-PAC 6 Clicks Score (PT): 23    Mona Sanchez PT  5/8/2022

## 2022-05-08 NOTE — PLAN OF CARE
Goal Outcome Evaluation:  Plan of Care Reviewed With: patient        Progress: no change  Outcome Evaluation: Pt amb 350' with FWW and SBA. Good safety awareness and balance noted. Activity limited by elevated pain. Continue with PT POC as pt tolerates.

## 2022-05-08 NOTE — PLAN OF CARE
Problem: Adult Inpatient Plan of Care  Goal: Plan of Care Review  Outcome: Ongoing, Progressing  Flowsheets  Taken 5/8/2022 0610 by Haley Bolaños RN  Plan of Care Reviewed With: patient  Taken 5/7/2022 1635 by Valentina Mathis RN  Progress: improving  Goal: Absence of Hospital-Acquired Illness or Injury  Intervention: Identify and Manage Fall Risk  Recent Flowsheet Documentation  Taken 5/8/2022 0400 by Haley Bolaños RN  Safety Promotion/Fall Prevention:   activity supervised   assistive device/personal items within reach   clutter free environment maintained   elopement precautions   fall prevention program maintained   gait belt   lighting adjusted   mobility aid in reach   muscle strengthening facilitated   nonskid shoes/slippers when out of bed   room organization consistent   safety round/check completed   toileting scheduled  Taken 5/8/2022 0200 by Haley Bolaños RN  Safety Promotion/Fall Prevention:   activity supervised   assistive device/personal items within reach   clutter free environment maintained   elopement precautions   fall prevention program maintained   gait belt   lighting adjusted   mobility aid in reach   muscle strengthening facilitated   nonskid shoes/slippers when out of bed   room organization consistent   safety round/check completed   toileting scheduled  Taken 5/8/2022 0000 by Haley Bolaños RN  Safety Promotion/Fall Prevention:   activity supervised   assistive device/personal items within reach   clutter free environment maintained   elopement precautions   fall prevention program maintained   gait belt   lighting adjusted   mobility aid in reach   muscle strengthening facilitated   nonskid shoes/slippers when out of bed   room organization consistent   safety round/check completed   toileting scheduled  Taken 5/7/2022 2200 by Haley Bolaños RN  Safety Promotion/Fall Prevention:   activity supervised   assistive device/personal items within reach   clutter free environment  maintained   elopement precautions   fall prevention program maintained   gait belt   lighting adjusted   mobility aid in reach   muscle strengthening facilitated   nonskid shoes/slippers when out of bed   room organization consistent   safety round/check completed   toileting scheduled  Taken 5/7/2022 2058 by Haley Bolaños RN  Safety Promotion/Fall Prevention:   activity supervised   assistive device/personal items within reach   clutter free environment maintained   elopement precautions   fall prevention program maintained   gait belt   lighting adjusted   mobility aid in reach   muscle strengthening facilitated   nonskid shoes/slippers when out of bed   room organization consistent   safety round/check completed   toileting scheduled  Intervention: Prevent Skin Injury  Recent Flowsheet Documentation  Taken 5/7/2022 2058 by Haley Bolaños RN  Skin Protection:   adhesive use limited   incontinence pads utilized   tubing/devices free from skin contact  Intervention: Prevent and Manage VTE (Venous Thromboembolism) Risk  Recent Flowsheet Documentation  Taken 5/8/2022 0400 by Haley Bolaños RN  VTE Prevention/Management:   bilateral   sequential compression devices on  Taken 5/8/2022 0200 by Haley Bolaños RN  VTE Prevention/Management:   bilateral   sequential compression devices on  Taken 5/8/2022 0000 by Haley Bolaños RN  VTE Prevention/Management:   bilateral   sequential compression devices on  Taken 5/7/2022 2200 by Haley Bolaños RN  VTE Prevention/Management:   bilateral   sequential compression devices on  Taken 5/7/2022 2058 by Haley Bolaños RN  Activity Management: ambulated to bathroom  VTE Prevention/Management:   bilateral   sequential compression devices on  Goal: Optimal Comfort and Wellbeing  Intervention: Monitor Pain and Promote Comfort  Recent Flowsheet Documentation  Taken 5/7/2022 2058 by Haley Bolaños RN  Pain Management Interventions:   pillow support provided   position adjusted    see MAR  Intervention: Provide Person-Centered Care  Recent Flowsheet Documentation  Taken 5/7/2022 2058 by Haley Bolaños, RN  Trust Relationship/Rapport:   care explained   choices provided   emotional support provided   empathic listening provided   questions answered   questions encouraged   reassurance provided   thoughts/feelings acknowledged   Goal Outcome Evaluation:  Plan of Care Reviewed With: patient         Pt. Rested well this shift.  Minimal c/o pain.  Ambulating with assist of one, gaitbelt, and walker.  Acewrap to left leg c,d,I. Prevena wouind vac intact.  Pablo picc saline locked.  Flushes well.  vss

## 2022-05-09 LAB
COTININE SERPL-MCNC: <1 NG/ML
INR PPP: 1.82 (ref 0.84–1.13)
NICOTINE SERPL-MCNC: <1 NG/ML
PROTHROMBIN TIME: 21.1 SECONDS (ref 11.4–14.4)

## 2022-05-09 PROCEDURE — 97116 GAIT TRAINING THERAPY: CPT

## 2022-05-09 PROCEDURE — 85610 PROTHROMBIN TIME: CPT

## 2022-05-09 PROCEDURE — 25010000002 DAPTOMYCIN PER 1 MG: Performed by: INTERNAL MEDICINE

## 2022-05-09 RX ORDER — WARFARIN SODIUM 3 MG/1
6 TABLET ORAL
Status: DISCONTINUED | OUTPATIENT
Start: 2022-05-09 | End: 2022-05-10 | Stop reason: HOSPADM

## 2022-05-09 RX ADMIN — Medication 10 ML: at 09:13

## 2022-05-09 RX ADMIN — SERTRALINE 100 MG: 100 TABLET, FILM COATED ORAL at 09:12

## 2022-05-09 RX ADMIN — LEVOTHYROXINE SODIUM 100 MCG: 0.1 TABLET ORAL at 06:25

## 2022-05-09 RX ADMIN — FAMOTIDINE 20 MG: 20 TABLET ORAL at 09:13

## 2022-05-09 RX ADMIN — MELOXICAM 15 MG: 7.5 TABLET ORAL at 09:12

## 2022-05-09 RX ADMIN — Medication 10 ML: at 21:10

## 2022-05-09 RX ADMIN — MIDODRINE HYDROCHLORIDE 5 MG: 5 TABLET ORAL at 12:19

## 2022-05-09 RX ADMIN — GABAPENTIN 300 MG: 300 CAPSULE ORAL at 17:02

## 2022-05-09 RX ADMIN — BACLOFEN 10 MG: 10 TABLET ORAL at 20:07

## 2022-05-09 RX ADMIN — BACLOFEN 10 MG: 10 TABLET ORAL at 09:12

## 2022-05-09 RX ADMIN — BACLOFEN 10 MG: 10 TABLET ORAL at 17:02

## 2022-05-09 RX ADMIN — WARFARIN SODIUM 6 MG: 3 TABLET ORAL at 17:03

## 2022-05-09 RX ADMIN — DAPTOMYCIN 500 MG: 500 INJECTION, POWDER, LYOPHILIZED, FOR SOLUTION INTRAVENOUS at 12:19

## 2022-05-09 RX ADMIN — TRAMADOL HYDROCHLORIDE 50 MG: 50 TABLET, COATED ORAL at 09:13

## 2022-05-09 RX ADMIN — GABAPENTIN 300 MG: 300 CAPSULE ORAL at 20:07

## 2022-05-09 RX ADMIN — MIDODRINE HYDROCHLORIDE 5 MG: 5 TABLET ORAL at 17:02

## 2022-05-09 RX ADMIN — POLYETHYLENE GLYCOL 3350 17 G: 17 POWDER, FOR SOLUTION ORAL at 09:12

## 2022-05-09 RX ADMIN — MIDODRINE HYDROCHLORIDE 5 MG: 5 TABLET ORAL at 09:12

## 2022-05-09 RX ADMIN — AMITRIPTYLINE HYDROCHLORIDE 50 MG: 50 TABLET, FILM COATED ORAL at 20:06

## 2022-05-09 RX ADMIN — GABAPENTIN 300 MG: 300 CAPSULE ORAL at 09:13

## 2022-05-09 NOTE — PROGRESS NOTES
"IM progress note      Corrina Reis  [id number]  1965     LOS: 5 days     Attending: Zoila Benites MD    Primary Care Provider: Abbey Merrill MD      Chief Complaint/Reason for visit:  No chief complaint on file.      Subjective   Doing well.  Good pain control.  No nausea, vomiting, or shortness of breath.  Had 2 bowel movements yesterday.    Objective     Visit Vitals  /76 (BP Location: Left arm, Patient Position: Lying)   Pulse 74   Temp 98.2 °F (36.8 °C) (Oral)   Resp 16   Ht 172.7 cm (67.99\")   Wt 105 kg (231 lb 7.7 oz)   SpO2 95%   BMI 35.21 kg/m²     Temp (24hrs), Av.3 °F (36.8 °C), Min:98.1 °F (36.7 °C), Max:98.7 °F (37.1 °C)      Intake/Output:    Intake/Output Summary (Last 24 hours) at 2022 1448  Last data filed at 2022 1351  Gross per 24 hour   Intake 740 ml   Output --   Net 740 ml        Physical Therapy:   Progress: improving  Outcome Evaluation: Pt. musa sit to stand transfer w/contact guard assist. She ambulated 200' w/front wheeled walker, contact guard assist. Gait limited by pain, fatigue. Increased effort required. She tolerated ther-ex well. Will continue to progress as able.    Physical Exam:     General Appearance:    Alert, cooperative, in no acute distress   Head:    Normocephalic, without obvious abnormality, atraumatic    Lungs:     Normal effort, symmetric chest rise,  clear to      auscultation bilaterally              Heart:    Regular rhythm and normal rate, normal S1 and S2    Abdomen:     Normal bowel sounds, no masses, no organomegaly, soft        non-tender, non-distended, no guarding, no rebound                Tenderness.KI       Pulses:   Pulses palpable and equal bilaterally   Skin:   No bleeding, bruising or rash          Results Review:     I reviewed the patient's new clinical results.   Results from last 7 days   Lab Units 22  0705 22  0823 22  0632   WBC 10*3/mm3 5.72 6.36 6.25   HEMOGLOBIN g/dL 10.2* " 10.5* 9.8*   HEMATOCRIT % 29.3* 29.8* 29.4*   PLATELETS 10*3/mm3 206 182 171     Results from last 7 days   Lab Units 05/08/22  0705 05/07/22  0822 05/06/22  0858   SODIUM mmol/L 139 137 144   POTASSIUM mmol/L 3.6 3.9 4.1   CHLORIDE mmol/L 105 103 109*   CO2 mmol/L 25.0 24.0 27.0   BUN mg/dL 16 13 14   CREATININE mg/dL 0.91 0.94 1.03*   CALCIUM mg/dL 8.9 8.8 8.8   BILIRUBIN mg/dL 0.6 0.6 0.5   ALK PHOS U/L 104 121* 77   ALT (SGPT) U/L 87* 134* 63*   AST (SGOT) U/L 76* 162* 118*   GLUCOSE mg/dL 86 109* 139*      Latest Reference Range & Units 05/09/22 08:06   Protime 11.4 - 14.4 Seconds 21.1 (H)   INR 0.84 - 1.13  1.82 (H)   (H): Data is abnormally high     All medications reviewed.   amitriptyline, 50 mg, Oral, Nightly  baclofen, 10 mg, Oral, TID  DAPTOmycin, 6 mg/kg (Adjusted), Intravenous, Q24H  famotidine, 20 mg, Oral, Daily  gabapentin, 300 mg, Oral, TID  levothyroxine, 100 mcg, Oral, Q AM  meloxicam, 15 mg, Oral, Daily  midodrine, 5 mg, Oral, TID AC  polyethylene glycol, 17 g, Oral, Daily  sertraline, 100 mg, Oral, Daily  sodium chloride, 10 mL, Intravenous, Q12H  warfarin, 6 mg, Oral, Daily      bisacodyl, 10 mg, Daily PRN  cyclobenzaprine, 5 mg, TID PRN  diphenhydrAMINE, 25 mg, Q6H PRN   Or  diphenhydrAMINE, 25 mg, Q6H PRN  HYDROmorphone, 0.5 mg, Q2H PRN   And  naloxone, 0.1 mg, Q5 Min PRN  labetalol, 10 mg, Q4H PRN  magnesium hydroxide, 10 mL, Daily PRN  ondansetron, 4 mg, Q6H PRN   Or  ondansetron, 4 mg, Q6H PRN  ondansetron, 4 mg, Q6H PRN  oxyCODONE, 10 mg, Q4H PRN  oxyCODONE, 5 mg, Q4H PRN  Pharmacy to dose warfarin, , Continuous PRN  sodium chloride, 500 mL, TID PRN  sodium chloride, 10 mL, PRN  traMADol, 50 mg, Q8H PRN        Assessment/Plan       S/P irrigation and debridement, left knee antibiotic spacer placement    Autonomic orthostatic hypotension    Infection of left knee (HCC)    Hyperlipidemia    Hypothyroid    History of DVT (deep vein thrombosis)    Chronic anticoagulation    Acute blood loss  anemia, mild, asymptomatic  Status post PICC placement 5/6/2022  LFT elevation, improved today     Plan   1. PT/OT- Knee immobilizer for first 2 weeks no knee flexion for the first 2 weeks  2. Pain control-prns , status post PNB cath, ropivacaine.    3. IS-encourage  4. DVT proph- Mechs/coumadin  5. Bowel regimen  6. Resume home medications as appropriate  7. Monitor post-op labs  8. DC planning , patient is interested in rehab facility.   following, referrals made.     Dr. Ascencio with [facility] following for antibiotics.  Follow op cultures.      Hypotension, Hyperlipidemia  - Continue home statin, and midodrine   -Hold HCTZ for now  - Monitor BP   - Holding parameters for BP meds  - Labetalol PRN for SBP>170     Hypothyroid  -Continue home Synthroid     Constipation  -Treated, continue bowel regimen.    Zoila Benites MD  05/09/22  14:48 EDT

## 2022-05-09 NOTE — PROGRESS NOTES
"Pharmacy Consult  -  Warfarin    Corrina Reis is a  56 y.o. female   Height - 172.7 cm (67.99\")  Weight - 105 kg (231 lb 7.7 oz)    Consulting Provider: Ortho  Indication: history of DVT/PE  Goal INR: 2.5-3.5  Home Regimen: 3 mg daily     Bridge Therapy: No    Drug-Drug Interactions with current regimen:     Meloxicam - may increase risk of bleeding      Tramadol - may increase risk of bleeding     Amitriptyline - may enhance effects of warfarin      Sertraline - may enhance effects of warfarin      Levothyroxine - may increase or decrease effects of warfarin     Warfarin Dosing During Admission:  Date  5/4 5/5 5/6 5/7 5/8 5/9      INR  1.35 1.43 1.49  1.68  1.77 1.82      Dose  3 mg 5 mg 5 mg  5 mg  7.5 mg 7.5 mg        Education Provided: Warfarin education provided on 5/5 verbally and in writing.  Discussed effects of warfarin, importance of checking INR, drug-drug and drug-food interactions, and signs/symptoms of bleeding and clotting.  Patient verbalized understanding through teach back. Patient notes that she monitors her INR at home every 2 weeks with a goal INR range of 2.5-3.5. All pertinent questions were answered.      Discharge Follow up:   Following Provider - Dr. Abbey Merrill (PCP)   Follow up time range or appointment - 2-3 days after discharge    Labs:  Results from last 7 days   Lab Units 05/09/22  0806 05/08/22  0705 05/07/22  0823 05/07/22  0822 05/06/22  0632 05/05/22  0558 05/04/22  0855   INR  1.82* 1.77*  --  1.68* 1.49* 1.43* 1.35*   HEMOGLOBIN g/dL  --  10.2* 10.5*  --  9.8* 9.9*  --    HEMATOCRIT %  --  29.3* 29.8*  --  29.4* 28.5*  --      Results from last 7 days   Lab Units 05/08/22  0705 05/07/22  0822 05/06/22  0858   SODIUM mmol/L 139 137 144   POTASSIUM mmol/L 3.6 3.9 4.1   CHLORIDE mmol/L 105 103 109*   CO2 mmol/L 25.0 24.0 27.0   BUN mg/dL 16 13 14   CREATININE mg/dL 0.91 0.94 1.03*   CALCIUM mg/dL 8.9 8.8 8.8   BILIRUBIN mg/dL 0.6 0.6 0.5   ALK PHOS U/L 104 121* 77 "   ALT (SGPT) U/L 87* 134* 63*   AST (SGOT) U/L 76* 162* 118*   GLUCOSE mg/dL 86 109* 139*     Current dietary intake: % of meals documented in last 24 hours    Diet Order   Procedures    Diet Regular     Assessment/Plan:   Pharmacy to dose warfarin for history of DVT/PE. Goal INR 2.5-3.5.  Subtherapeutic INR of 1.82 today. Increased from 1.77 yesterday.  Will order dose of warfarin 6mg today.   Note: If patient is discharged, would recommend 5mg daily until recheck in 2-3 days.   Daily PT/INR ordered.  Pharmacy will continue to monitor signs/symptoms of bleeding, dietary intake, and drug-drug interactions and make dose adjustments as necessary.    Taylor Riedel, PharmD  Pharmacy Resident  5/9/2022  14:24 EDT

## 2022-05-09 NOTE — PLAN OF CARE
Goal Outcome Evaluation:  Plan of Care Reviewed With: patient        Progress: improving  Outcome Evaluation: Patient's VSS and UOP adequate. Patient had a bowel movement this evening. Patient did not complain of pain above a 3 and slept well throughout the night. Plan is to discharge tomorrow to a rehabilitation facility.

## 2022-05-09 NOTE — CASE MANAGEMENT/SOCIAL WORK
Discharge Planning Assessment  [facility]     Patient Name: Corrina Reis  MRN: [id number]  Today's Date: 5/9/2022    Admit Date: 5/4/2022     Discharge Needs Assessment    No documentation.                Discharge Plan     Row Name 05/09/22 1607       Plan    Plan Home with IV abx provided by the [facility] office and [facility]    Patient/Family in Agreement with Plan yes    Plan Comments Followed up with Ms. Reis at the bedside for discharge planning.  Ms. Reis requested inpatient rehab for IV administration.  The [facility] facilities have not yet responded to bed request, and she is already ambulating 350 ft with PT.  Ms. Reis's [employer] insurance requires a prior auth and she will likely be denied for rehab.    Ms. Reis is agreeable to returning home with her friend's assistance.  The patient lives in [place] and there are no home health providers in that [facility].  She plans on going to her friend's home in [facility], [place] [address], home ph [phone], cell [phone].  Contacted [facility] and spoke to Maureen.  [facility] accepts [employer].  CM faxed the home health referral to fax [phone] for skilled nursing: PICC care, labs and home PT.  Maureen confirmed that [facility] has accepted Ms. Reis for home services.  [facility] will be providing the IV abx and supplies.  Arranged with the [facility] office with Melvina.  The [facility] RN will deliver the medication, supplies and do the teaching tomorrow before Ms. Reis is discharged. Telehealth visit with Dr. Ascencio is on AVS.    Ms. Reis's  will be transporting her home.    CM will continue to follow.    Final Discharge Disposition Code 06 - home with home health care                                    Continued Care and Services - Admitted Since 5/4/2022               Dialysis/Infusion Coordination complete.    Service Provider Request Status Selected Services Address  Phone Fax Patient Preferred    [facility]   Selected Infusion and IV Therapy [address], [facility] [address] [phone] --          Home Medical Care     Service Provider Request Status Selected Services Address Phone Fax Patient Preferred    [facility]  Pending - No Request Sent N/A [address] [facility] [address] [phone] --              Expected Discharge Date and Time     Expected Discharge Date Expected Discharge Time    May 10, 2022      Elli Aguilera RN

## 2022-05-09 NOTE — THERAPY TREATMENT NOTE
Patient Name: Corrina Reis  : 1965    MRN: [id number]                              Today's Date: 2022       Admit Date: 2022    Visit Dx:     ICD-10-CM ICD-9-CM   1. S/P irrigation and debridement, left knee antibiotic spacer placement  Z98.890 V45.89   2. Infection of left knee (HCC)  M00.9 686.9     Patient Active Problem List   Diagnosis   • Autonomic orthostatic hypotension   • Infection of left knee (HCC)   • S/P irrigation and debridement, left knee antibiotic spacer placement   • Hyperlipidemia   • Hypothyroid   • History of DVT (deep vein thrombosis)   • Chronic anticoagulation     Past Medical History:   Diagnosis Date   • Anxiety    • Borderline diabetes     daily at home checks   • Deep vein thrombosis (HCC)     left leg  and    • GERD (gastroesophageal reflux disease)    • History of migraine     botox shots for migraines   • History of pulmonary embolus (PE)    • History of transfusion     autologus blood with back surgery   • Hyperlipidemia    • Hypertension    • IBS (irritable bowel syndrome)    • MRSA infection 2021    MRSA of left knee; IV antibioitcs x 4 weeks and then 6 weeks of PO antibiotics   • PONV (postoperative nausea and vomiting)    • Pyogenic arthritis of left hip (HCC) 2021   • Thyroid nodule     nodules are monitored     Past Surgical History:   Procedure Laterality Date   • APPENDECTOMY     • BACK SURGERY      Daniel removed   • CHOLECYSTECTOMY     • COLONOSCOPY     • ENDOMETRIAL ABLATION     • HYSTERECTOMY     • SPINAL CORD STIMULATOR IMPLANT     • SPINAL FUSION     • THORACIC OUTLET SURGERY      -partial removal of sternum -rib removal   • TOTAL KNEE  PROSTHESIS REMOVAL W/ SPACER INSERTION Left 2022    Procedure: IRRIGATION AND DEBRIDMENT, LEFT KNEE ANTIBIOTIC SPACER PLACEMENT;  Surgeon: Rei Sutherland MD;  Location: [facility];  Service: Orthopedics;  Laterality: Left;      General Information     Row Name  05/09/22 1522          Physical Therapy Time and Intention    Document Type therapy note (daily note)  -     Mode of Treatment physical therapy  -     Row Name 05/09/22 1522          General Information    Patient Profile Reviewed yes  -     Existing Precautions/Restrictions hip;brace worn when out of bed;other (see comments)   -     Row Name 05/09/22 1522          Cognition    Orientation Status (Cognition) oriented x 4  -     Row Name 05/09/22 1522          Safety Issues, Functional Mobility    Impairments Affecting Function (Mobility) endurance/activity tolerance;strength;pain;range of motion (ROM);balance;postural/trunk control  -           User Key  (r) = Recorded By, (t) = Taken By, (c) = Cosigned By    Initials Name Provider Type     Mona Sanchez PT Physical Therapist               Mobility     Row Name 05/09/22 1522          Bed Mobility    Bed Mobility scooting/bridging;supine-sit  -     Scooting/Bridging Blanco (Bed Mobility) modified independence  -     Supine-Sit Bl[place] (Bed Mobility) modified independence  -     Row Name 05/09/22 1522          Bed-Chair Transfer    Bed-Chair [place] (Transfers) modified independence  -[facility] Name 05/09/22 1522          Sit-Stand Transfer    Sit-Stand Blanco (Transfers) modified independence  -     Assistive Device (Sit-Stand Transfers) walker, front-wheeled  -     Row Name 05/09/22 1522          Gait/Stairs (Locomotion)    Blanco Level (Gait) standby assist  -     Assistive Device (Gait) walker, front-wheeled  -     Distance in Feet (Gait) 350  -     Deviations/Abnormal Patterns (Gait) bilateral deviations;kelsey decreased;gait speed decreased;stride length decreased  -     Bilateral Gait Deviations forward flexed posture  -     Left Sided Gait Deviations weight shift ability decreased;heel strike decreased  -     Blanco Level (Stairs) contact guard  -     Handrail Location (Stairs) both  sides  -HP     Number of Steps (Stairs) 5  -HP     Ascending Technique (Stairs) step-to-step  -HP     Descending Technique (Stairs) step-to-step  -HP     Comment, (Gait/Stairs) Pt amb 350' with FWW and CGA. Pt demonstrated good safety awareness and balance. Pt navigated stairs with CGA for safety only. VC for sequencing .  -     Row Name 05/09/22 1522          Mobility    Extremity Weight-bearing Status left lower extremity  -HP     Left Lower Extremity (Weight-bearing Status) weight-bearing as tolerated (WBAT)  -           User Key  (r) = Recorded By, (t) = Taken By, (c) = Cosigned By    Initials Name Provider Type     Mona Sanchez PT Physical Therapist               Obj/Interventions     Row Name 05/09/22 1523          Motor Skills    Therapeutic Exercise other (see comments)  pt verbalized Ind with ther-ex  -     Row Name 05/09/22 1523          Balance    Balance Assessment sitting dynamic balance;standing dynamic balance  -HP     Dynamic Sitting Balance independent  -HP     Dynamic Standing Balance standby assist  -HP           User Key  (r) = Recorded By, (t) = Taken By, (c) = Cosigned By    Initials Name Provider Type     Mona Sanchez PT Physical Therapist               Goals/Plan    No documentation.                Clinical Impression     Row Name 05/09/22 1524          Pain    Pretreatment Pain Rating 3/10  -HP     Posttreatment Pain Rating 3/10  -HP     Pain Location - Side/Orientation Left  -HP     Pain Location generalized  -     Pain Location - knee  -HP     Pain Intervention(s) Cold applied;Repositioned;Ambulation/increased activity  -     Row Name 05/09/22 1524          Plan of Care Review    Plan of Care Reviewed With patient  -HP     Progress improving  -     Outcome Evaluation Pt performed bed mobility without need for assist. Pt amb 350' with FWW and SBA. Pt navigated 5 steps with CGA and VC for sequencing. No safety concerns or LOB noted. Continue with PT POC as pt tolerates.   -     Row Name 05/09/22 1524          Vital Signs    Pre Systolic BP Rehab --  VSS; RN cleared for therapy  -HP     Pre Patient Position Supine  -HP     Intra Patient Position Standing  -HP     Post Patient Position Sitting  -HP     Row Name 05/09/22 1524          Positioning and Restraints    Pre-Treatment Position in bed  -HP     Post Treatment Position chair  -HP     In Chair notified nsg;reclined;sitting;call light within reach;encouraged to call for assist;exit alarm on;with family/caregiver;legs elevated;waffle cushion  -           User Key  (r) = Recorded By, (t) = Taken By, (c) = Cosigned By    Initials Name Provider Type     Mona Sanchez PT Physical Therapist               Outcome Measures     Row Name 05/09/22 1525          How much help from another person do you currently need...    Turning from your back to your side while in flat bed without using bedrails? 4  -HP     Moving from lying on back to sitting on the side of a flat bed without bedrails? 4  -HP     Moving to and from a bed to a chair (including a wheelchair)? 4  -HP     Standing up from a chair using your arms (e.g., wheelchair, bedside chair)? 4  -HP     Climbing 3-5 steps with a railing? 4  -HP     To walk in hospital room? 4  -HP     AM-PAC 6 Clicks Score (PT) 24  -HP     Highest level of mobility 8 --> Walked 250 feet or more  -     Row Name 05/09/22 1525          Functional Assessment    Outcome Measure Options AM-PAC 6 Clicks Basic Mobility (PT)  -           User Key  (r) = Recorded By, (t) = Taken By, (c) = Cosigned By    Initials Name Provider Type     Mona Sanchez PT Physical Therapist                             Physical Therapy Education                 Title: PT OT SLP Therapies (Done)     Topic: Physical Therapy (Done)     Point: Mobility training (Done)     Learning Progress Summary           Patient Acceptance, E,KATHLEEN, DEMETRIS TALAVERA by  at 5/9/2022 1526    Acceptance, E,D, DEMETRIS TALAVERA by  at 5/8/2022 0930    Acceptance,  E, NR by AS at 5/7/2022 1337    Acceptance, E, NR by AS at 5/7/2022 0823    Eager, E, VU,NR by SS at 5/6/2022 1636    Comment: Reviewed safety/technique with transfers, ambulation, HEP, PT POC    Eager, E, VU,NR by SS at 5/6/2022 1626    Comment: Reviewed safety/technique with bed mobility, transfers, ambulation, HEP, KI, WB status, PT POC    Acceptance, E,TB, VU by  at 5/5/2022 1526    Acceptance, E,TB, VU by  at 5/5/2022 0940    Acceptance, E,D, VU by TED at 5/4/2022 1345    Comment: Educated on safe sequencing with bed mobility, ambulatory transfers, and gait. Reviewed HEP and knee precautions.                   Point: Home exercise program (Done)     Learning Progress Summary           Patient Acceptance, E,D, VU,DU by  at 5/9/2022 1526    Acceptance, E,D, VU,DU by  at 5/8/2022 0930    Acceptance, E, NR by AS at 5/7/2022 1337    Acceptance, E, NR by AS at 5/7/2022 0823    Eager, E, VU,NR by SS at 5/6/2022 1636    Comment: Reviewed safety/technique with transfers, ambulation, HEP, PT POC    Eager, E, VU,NR by SS at 5/6/2022 1626    Comment: Reviewed safety/technique with bed mobility, transfers, ambulation, HEP, KI, WB status, PT POC    Acceptance, E,TB, VU by  at 5/5/2022 1526    Acceptance, E,TB, VU by  at 5/5/2022 0940    Acceptance, E,D, VU by TED at 5/4/2022 1345    Comment: Educated on safe sequencing with bed mobility, ambulatory transfers, and gait. Reviewed HEP and knee precautions.                   Point: Body mechanics (Done)     Learning Progress Summary           Patient Acceptance, E,D, VU,DU by  at 5/9/2022 1526    Acceptance, E,D, VU,DU by  at 5/8/2022 0930    Acceptance, E, NR by AS at 5/7/2022 1337    Acceptance, E, NR by AS at 5/7/2022 0823    Eager, E, VU,NR by SS at 5/6/2022 1636    Comment: Reviewed safety/technique with transfers, ambulation, HEP, PT POC    HERMELINDA Roach VU,NR by SS at 5/6/2022 1626    Comment: Reviewed safety/technique with bed mobility, transfers, ambulation,  HEP, KI, WB status, PT POC    Acceptance, E,TB, VU by  at 5/5/2022 1526    Acceptance, E,TB, VU by  at 5/5/2022 0940    Acceptance, E,D, VU by  at 5/4/2022 1345    Comment: Educated on safe sequencing with bed mobility, ambulatory transfers, and gait. Reviewed HEP and knee precautions.                   Point: Precautions (Done)     Learning Progress Summary           Patient Acceptance, E,D, VU,DU by  at 5/9/2022 1526    Acceptance, E,D, VU,DU by  at 5/8/2022 0930    Acceptance, E, NR by AS at 5/7/2022 1337    Acceptance, E, NR by AS at 5/7/2022 0823    Eager, E, VU,NR by  at 5/6/2022 1636    Comment: Reviewed safety/technique with transfers, ambulation, HEP, PT POC    Eager, E, VU,NR by  at 5/6/2022 1626    Comment: Reviewed safety/technique with bed mobility, transfers, ambulation, HEP, KI, WB status, PT POC    Acceptance, E,TB, VU by  at 5/5/2022 1526    Acceptance, E,TB, VU by  at 5/5/2022 0940    Acceptance, E,D, VU by  at 5/4/2022 1345    Comment: Educated on safe sequencing with bed mobility, ambulatory transfers, and gait. Reviewed HEP and knee precautions.                               User Key     Initials Effective Dates Name Provider Type Discipline    AS 06/16/21 -  Tyra Wynn, PTA Physical Therapist Assistant PT     06/16/21 -  Lev Harper, PT Physical Therapist PT     09/22/20 -  Reinaldo Bridges, PT Physical Therapist PT     06/01/21 -  Mona Sanchez, PT Physical Therapist PT     06/01/21 -  Ludy Perez, PT Physical Therapist PT              PT Recommendation and Plan     Plan of Care Reviewed With: patient  Progress: improving  Outcome Evaluation: Pt performed bed mobility without need for assist. Pt amb 350' with FWW and SBA. Pt navigated 5 steps with CGA and VC for sequencing. No safety concerns or LOB noted. Continue with PT POC as pt tolerates.     Time Calculation:    PT Charges     Row Name 05/09/22 1355             Time Calculation    Start Time 1358   -HP      PT Received On 05/09/22  -HP              Timed Charges    71371 - Gait Training Minutes  15  -HP              Total Minutes    Timed Charges Total Minutes 15  -HP       Total Minutes 15  -HP            User Key  (r) = Recorded By, (t) = Taken By, (c) = Cosigned By    Initials Name Provider Type    HP Mona Sanchez, PT Physical Therapist              Therapy Charges for Today     Code Description Service Date Service Provider Modifiers Qty    98419547721 HC GAIT TRAINING EA 15 MIN 5/8/2022 Mona Sanchez, PT GP 1    95898995791 HC PT THERAPEUTIC ACT EA 15 MIN 5/8/2022 Mona Sanchez, PT GP 1    85930677143 HC GAIT TRAINING EA 15 MIN 5/8/2022 Mona Sanchez, PT GP 1    54895846824 HC GAIT TRAINING EA 15 MIN 5/9/2022 Mona Sanchez, PT GP 1          PT G-Codes  Outcome Measure Options: AM-PAC 6 Clicks Basic Mobility (PT)  AM-PAC 6 Clicks Score (PT): 24    Mona Sanchez PT  5/9/2022

## 2022-05-09 NOTE — PROGRESS NOTES
INFECTIOUS DISEASE Progress Note    Corrina Reis  1965  [id number]    Consult: 5/5/22  Admit date: 5/4/2022    Requesting Provider: Rei Sutherland MD  Evaluating physician: Reji Ascencio MD  Reason for Consultation: MRSA septic left knee  Chief Complaint: Above      Subjective   History of present illness:  Patient is a very pleasant  56 y.o.  Yr old female with a history of borderline diabetes mellitus, anxiety, DVT in the past, GERD, spinal cord stimulator for chronic pain (2012), who fell in April 2021 with left knee injury and subsequent MRSA infection treated with arthroscopic washout in April 2021.  Cultures reported positive for MRSA in the knee and in blood and she was treated with about 6 weeks of IV vancomycin (until around 6/10/2021 per Dr. Evaristo Figueroa, infectious disease at [facility]), followed by 6 weeks of doxycycline.  The patient had progressive left knee pain with inability to ambulate and was evaluated by Dr. Rei Bee who aspirated left knee with reported fluid showing 147 WBCs, 41% PMNs.  CT scan without evidence of abscess or sequestrum.  The patient was taken to the operating room after admission to [facility] on 5/4/2022, where she was found to have extensive injuries including minimal cartilage, extensive osteophytes.  Antibiotic spacer was placed.  I was consulted on 5/5/2022 for further evaluation and treatment.  No other localizing signs or symptoms of infection.    5/6/2022 history reviewed.  No high fevers or chills.  Still has a little bit of left knee pain.  Tolerating daptomycin until 6/16/2022.  Left knee culture negative to date.  May be looking at transferring to skilled facility.    5/7/2022 history reviewed.  Knee pain controlled.  On daptomycin until 6/16/2022 for left knee MRSA infection.  Status postdebridement and antibiotic spacer placed 5/4/2022.  Awaits knee replacement in 8 to 10 weeks.    5/8/2022 history  reviewed.  On daptomycin until 6/16 for left knee MRSA infection.  No muscle pain or fever.    5/9/2022 history reviewed.  Continues on daptomycin until 6/16 for left knee MRSA infection.  Cultures from most recent surgery and antibiotic spacer negative to date.  No muscle pain.  Awaiting placement.    Past Medical History:   Diagnosis Date   • Anxiety    • Borderline diabetes     daily at home checks   • Deep vein thrombosis (HCC)     left leg 2001 and 2014   • GERD (gastroesophageal reflux disease)    • History of migraine     botox shots for migraines   • History of pulmonary embolus (PE) 2014   • History of transfusion     autologus blood with back surgery   • Hyperlipidemia    • Hypertension    • IBS (irritable bowel syndrome)    • MRSA infection 04/2021    MRSA of left knee; IV antibioitcs x 4 weeks and then 6 weeks of PO antibiotics   • PONV (postoperative nausea and vomiting)    • Pyogenic arthritis of left hip (HCC) 04/2021   • Thyroid nodule     nodules are monitored       Past Surgical History:   Procedure Laterality Date   • APPENDECTOMY  1978   • BACK SURGERY      Daniel removed   • CHOLECYSTECTOMY  1997   • COLONOSCOPY     • ENDOMETRIAL ABLATION     • HYSTERECTOMY  1998   • SPINAL CORD STIMULATOR IMPLANT  2013   • SPINAL FUSION  1978   • THORACIC OUTLET SURGERY      1994-partial removal of sternum 1995-rib removal   • TOTAL KNEE  PROSTHESIS REMOVAL W/ SPACER INSERTION Left 5/4/2022    Procedure: IRRIGATION AND DEBRIDMENT, LEFT KNEE ANTIBIOTIC SPACER PLACEMENT;  Surgeon: Rei Sutherland MD;  Location: [facility];  Service: Orthopedics;  Laterality: Left;       Pediatric History   Patient Parents   • Not on file     Other Topics Concern   • Not on file   Social History Narrative   • Not on file       family history includes Atrial fibrillation in her mother; Autoimmune disease in her sister; Diabetes in her brother, mother, and sister; Graves' disease in her brother and sister; Heart attack in her brother;  Heart disease in her mother; Hypertension in her brother, brother, and sister; Lung cancer in her brother; No Known Problems in her father and sister; Rectal cancer in her brother.    Allergies   Allergen Reactions   • Msg [Monosodium Glutamate] Nausea And Vomiting and Headache     Numbness around mouth and eyes   • Orange Nausea And Vomiting and Headache     Numbness around mouth and eyes   • Penicillins Hives   • Sulfa Antibiotics Hives       Immunization History   Administered Date(s) Administered   • COVID-19 (MODERNA) 1st, 2nd, 3rd Dose Only 03/19/2021, 05/12/2021       Medication:    Current Facility-Administered Medications:   •  amitriptyline (ELAVIL) tablet 50 mg, 50 mg, Oral, Nightly, Gillian Hurtado APRN, 50 mg at 05/08/22 2008  •  baclofen (LIORESAL) tablet 10 mg, 10 mg, Oral, TID, Gillian Hurtado APRN, 10 mg at 05/08/22 2008  •  bisacodyl (DULCOLAX) suppository 10 mg, 10 mg, Rectal, Daily PRN, Zoila Benites MD  •  cyclobenzaprine (FLEXERIL) tablet 5 mg, 5 mg, Oral, TID PRN, Gillian Hurtado APRN  •  DAPTOmycin (CUBICIN) 500 mg in sodium chloride 0.9 % 50 mL IVPB, 6 mg/kg (Adjusted), Intravenous, Q24H, Reji Ascencio MD, Last Rate: 100 mL/hr at 05/08/22 1137, 500 mg at 05/08/22 1137  •  diphenhydrAMINE (BENADRYL) capsule 25 mg, 25 mg, Oral, Q6H PRN **OR** diphenhydrAMINE (BENADRYL) injection 25 mg, 25 mg, Intravenous, Q6H PRN, Rei Sutherland MD  •  famotidine (PEPCID) tablet 20 mg, 20 mg, Oral, Daily, Gillian Hurtado APRN, 20 mg at 05/08/22 0813  •  gabapentin (NEURONTIN) capsule 300 mg, 300 mg, Oral, TID, Zoila Benites MD, 300 mg at 05/08/22 2008  •  HYDROmorphone (DILAUDID) injection 0.5 mg, 0.5 mg, Intravenous, Q2H PRN **AND** naloxone (NARCAN) injection 0.1 mg, 0.1 mg, Intravenous, Q5 Min PRN, Rei Sutherland MD  •  labetalol (NORMODYNE,TRANDATE) injection 10 mg, 10 mg, Intravenous, Q4H PRN, Gillian Hurtado APRN  •  lactated ringers infusion, 9 mL/hr, Intravenous,  Continuous, Rei Sutherland MD, Stopped at 05/04/22 1306  •  lactated ringers infusion, 100 mL/hr, Intravenous, Continuous, Rei Sutherland MD, Last Rate: 100 mL/hr at 05/05/22 0202, 100 mL/hr at 05/05/22 0202  •  levothyroxine (SYNTHROID, LEVOTHROID) tablet 100 mcg, 100 mcg, Oral, Q AM, Gillian Hurtado, APRN, 100 mcg at 05/09/22 0625  •  magnesium hydroxide (MILK OF MAGNESIA) suspension 10 mL, 10 mL, Oral, Daily PRN, Zoila Benites MD, 10 mL at 05/08/22 1643  •  meloxicam (MOBIC) tablet 15 mg, 15 mg, Oral, Daily, Rei Sutherland MD, 15 mg at 05/08/22 0814  •  midodrine (PROAMATINE) tablet 5 mg, 5 mg, Oral, TID AC, Gillian Hurtado, APRN, 5 mg at 05/08/22 1643  •  ondansetron (ZOFRAN) tablet 4 mg, 4 mg, Oral, Q6H PRN **OR** ondansetron (ZOFRAN) injection 4 mg, 4 mg, Intravenous, Q6H PRN, Rei Sutherland MD  •  ondansetron (ZOFRAN) injection 4 mg, 4 mg, Intravenous, Q6H PRN, Gillian Hurtado, APRN  •  oxyCODONE (ROXICODONE) immediate release tablet 10 mg, 10 mg, Oral, Q4H PRN, Rei Sutherland MD  •  oxyCODONE (ROXICODONE) immediate release tablet 5 mg, 5 mg, Oral, Q4H PRN, Rei Sutherland MD, 5 mg at 05/08/22 1303  •  Pharmacy to dose warfarin, , Does not apply, Continuous PRN, Rei Sutherland MD  •  polyethylene glycol (MIRALAX) packet 17 g, 17 g, Oral, Daily, Zoila Benites MD, 17 g at 05/08/22 0813  •  ropivacaine (Naropin) 0.2% in NS infusion (ARROW Pump), 10 mL/hr, Peripheral Nerve, Continuous, Rei Sutherland MD, Last Rate: 10 mL/hr at 05/06/22 1600, 10 mL/hr at 05/06/22 1600  •  sertraline (ZOLOFT) tablet 100 mg, 100 mg, Oral, Daily, Gillian Hurtado APRN, 100 mg at 05/08/22 0813  •  sodium chloride 0.9 % bolus 500 mL, 500 mL, Intravenous, TID PRN, Gillian Hurtado APRN  •  sodium chloride 0.9 % flush 10 mL, 10 mL, Intravenous, Q12H, Reji Ascencio MD, 10 mL at 05/08/22 2009  •  sodium chloride 0.9 % flush 10 mL, 10 mL, Intravenous, PRN, Reji Ascencio MD  •  traMADol (ULTRAM)  "tablet 50 mg, 50 mg, Oral, Q8H PRN, Rei Sutherland MD, 50 mg at 05/06/22 1502  •  warfarin (COUMADIN) tablet 7.5 mg, 7.5 mg, Oral, Daily, Raman Isaacs, PharmD, 7.5 mg at 05/08/22 1714    Please refer to the medical record for a full medication list    Review of Systems:    Constitutional-- No Fever, chills or sweats.  Appetite good, and no malaise. No fatigue.  HEENT-- No new vision, hearing or throat complaints.  No epistaxis or oral sores.  Denies odynophagia or dysphagia.  No odynophagia or dysphagia. No headache, photophobia or neck stiffness.  CV-- No chest pain, palpitation or syncope  Resp-- No SOB/cough/Hemoptysis  GI- No nausea, vomiting, or diarrhea.  No hematochezia, melena, or hematemesis. Denies jaundice or chronic liver disease.  -- No dysuria, hematuria, or flank pain.  Denies hesitancy, urgency or flank pain.  Lymph- no swollen lymph nodes in neck/axilla or groin.   Heme- No active bruising or bleeding; no Hx of DVT or PE.  MS-- no swelling or pain in the bones or joints of arms/legs.  No new back pain.  Except left knee pain.  Neuro-- No acute focal weakness or numbness in the arms or legs.  No seizures.  Skin--No rashes or lesions    Physical Exam:   Vital Signs   Temp:  [98.1 °F (36.7 °C)-98.7 °F (37.1 °C)] 98.2 °F (36.8 °C)  Heart Rate:  [74-85] 74  Resp:  [14-16] 16  BP: (122-139)/(74-78) 125/76    Temp  Min: 98.1 °F (36.7 °C)  Max: 98.7 °F (37.1 °C)  BP  Min: 122/74  Max: 139/78  Pulse  Min: 74  Max: 85  Resp  Min: 14  Max: 16  SpO2  Min: 92 %  Max: 96 %    Blood pressure 125/76, pulse 74, temperature 98.2 °F (36.8 °C), temperature source Oral, resp. rate 16, height 172.7 cm (67.99\"), weight 105 kg (231 lb 7.7 oz), SpO2 95 %.  GENERAL: Awake and alert, in minimal distress. Appears older than stated age.  Resting in bed.  Watching TV.  HEENT:  Normocephalic, atraumatic.  Oropharynx without thrush. Dentition in good repair. No cervical adenopathy. No neck masses.  Ears externally normal, " Nose externally normal.  EYES:  No conjunctival injection. No icterus. EOM full.  LYMPHATICS: No lymphadenopathy of the neck or axillary or inguinal regions.   HEART: No murmur, gallop, or pericardial friction rub. Reg rate rhythm.  LUNGS: Clear to auscultation and percussion. No respiratory distress, no use of accessory muscles.  No wheezing.  ABDOMEN: Soft, nontender, nondistended. No appreciable HSM.  Bowel sounds normal.  SKIN: Warm and dry without cutaneous eruptions.  No nodules.  Left knee surgical dressing in place, no drainage   PSYCHIATRIC: Mental status lucid. No confusion.  EXT:  No cellulitic change.  Decreased range of motion left knee.  NEURO: Oriented to name, nonfocal.      Results Review:   I reviewed the patient's new clinical results.  I reviewed the patient's new imaging results and agree with the interpretation.  I reviewed the patient's other test results and agree with the interpretation    Results from last 7 days   Lab Units 05/08/22  0705 05/07/22  0823 05/06/22  0632   WBC 10*3/mm3 5.72 6.36 6.25   HEMOGLOBIN g/dL 10.2* 10.5* 9.8*   HEMATOCRIT % 29.3* 29.8* 29.4*   PLATELETS 10*3/mm3 206 182 171     Results from last 7 days   Lab Units 05/08/22  0705   SODIUM mmol/L 139   POTASSIUM mmol/L 3.6   CHLORIDE mmol/L 105   CO2 mmol/L 25.0   BUN mg/dL 16   CREATININE mg/dL 0.91   GLUCOSE mg/dL 86   CALCIUM mg/dL 8.9     Results from last 7 days   Lab Units 05/08/22  0705   ALK PHOS U/L 104   BILIRUBIN mg/dL 0.6   ALT (SGPT) U/L 87*   AST (SGOT) U/L 76*     Results from last 7 days   Lab Units 05/06/22  0632   SED RATE mm/hr 10     Results from last 7 days   Lab Units 05/06/22  0858   CRP mg/dL 5.02*    on 5/6/2022, 306 on 5/5 prior to daptomycin.  451 on 5/7.          Estimated Creatinine Clearance: 85.4 mL/min (by C-G formula based on SCr of 0.91 mg/dL).    Microbiology:  Microbiology Results Abnormal     Procedure Component Value - Date/Time    Anaerobic Culture 10 Day Incubation -  Tissue, Knee, Left [[id number]] Collected: 05/04/22 1113    Lab Status: Preliminary result Specimen: Tissue from Knee, Left Updated: 05/09/22 0628     Anaerobic Culture No anaerobes isolated at 5 days    Anaerobic Culture 10 Day Incubation - Tissue, Knee, Left [[id number]] Collected: 05/04/22 1114    Lab Status: Preliminary result Specimen: Tissue from Knee, Left Updated: 05/09/22 0628     Anaerobic Culture No anaerobes isolated at 5 days    Anaerobic Culture 10 Day Incubation - Tissue, Knee, Left [[id number]] Collected: 05/04/22 1114    Lab Status: Preliminary result Specimen: Tissue from Knee, Left Updated: 05/09/22 0628     Anaerobic Culture No anaerobes isolated at 5 days    Tissue / Bone Culture - Tissue, Knee, Left [[id number]] Collected: 05/04/22 1114    Lab Status: Final result Specimen: Tissue from Knee, Left Updated: 05/07/22 0644     Tissue Culture No growth at 3 days     Gram Stain No WBCs or organisms seen    Tissue / Bone Culture - Tissue, Knee, Left [[id number]] Collected: 05/04/22 1114    Lab Status: Final result Specimen: Tissue from Knee, Left Updated: 05/07/22 0644     Tissue Culture No growth at 3 days     Gram Stain Rare (1+) WBCs seen      No organisms seen    Tissue / Bone Culture - Tissue, Knee, Left [[id number]] Collected: 05/04/22 1113    Lab Status: Final result Specimen: Tissue from Knee, Left Updated: 05/07/22 0644     Tissue Culture No growth at 3 days     Gram Stain No WBCs or organisms seen    AFB Culture - Tissue, Knee, Left [[id number]] Collected: 05/04/22 1113    Lab Status: Preliminary result Specimen: Tissue from Knee, Left Updated: 05/05/22 1144     AFB Stain No acid fast bacilli seen on concentrated smear          Radiology:  Imaging Results (Last 72 Hours)     ** No results found for the last 72 hours. **          IMPRESSION:     1.  Left knee MRSA infection, previous I&D April 2021 and treated, along with treated MRSA sepsis.  Data deficit.  Extensive injury to left knee resulting  in the need for eventual prosthetic knee replacement.  Debrided on 5/4/2022 with antibiotic spacer placed.  Cultures negative to date.  MRSA swab negative.  2.  Anemia, related to acute postoperative blood loss and chronic disease.  Ongoing.  3.  Hypocalcemia.  Resolved.  4.  Prediabetes.  5.  History of GERD, DVT, chronic pain with spinal cord stimulator.  6.  Previous history of MRSA sepsis and left knee septic arthritis April 2021.  7.  Mild CPK elevation prior to daptomycin, 451 on 5/7, worse.  Likely from surgery, but may also be Dapto.  303 on 5/8, continues.  8.  ALT 87, AST 76, likely from medications versus other.    Moving forward on current regimen.    Plan:    1.  Diagnostically, continue to follow patient's physical exam, CBC, CMP, CRP, CPK, cultures obtained from surgery on 5/4/2022.  2.  Therapeutically, continue daptomycin 6 mg/kg IV daily for 6 weeks until approximately 6/16/2022 and reassess.  Hold atorvastatin while on daptomycin.  We will arrange for this as outpatient therapy, or in rehab facility.  3.  Supportive care.    I discussed the patient's findings and my recommendations with patient and nursing staff    Thank you for asking me to see Corrina Reis.  Our group would be pleased to follow this patient over the course of their hospitalization and assist with outpatient antimicrobial therapy, as indicated.  Further recommendations depend on the results of the cultures and clinical course.  Side effects of antibiotics discussed.  See next on Wednesday, call sooner if needed.    Case management orders: Please arrange for home antibiotics with [facility] with daptomycin 500 mg IV daily until 6/16/2022.  Check CBC, CMP, CRP, CPK weekly while on IV antibiotics.  Fax orders to [phone], call [phone] with final arrangements. Arrange for follow-up with me by telehealth in 1 week, and in person in 2 weeks.    Reji Ascencio MD  5/9/2022

## 2022-05-09 NOTE — PLAN OF CARE
Goal Outcome Evaluation:  Plan of Care Reviewed With: patient        Progress: improving  Outcome Evaluation: c/o pain prn meds given, VSS, left knee with wound vac CDI, AAOxr4, up with aqssitance x1, ambulating with assist x1 and walker, BRP, all pulses palpable.

## 2022-05-09 NOTE — PLAN OF CARE
Patient is AxO x4. PRN pain meds given. Bowel meds given and large BM was noted. Ambulated with PT and nursing staff. VSS. RA. Immobilizer on. Plan is to potentially be discharged to a rehab facility in [place]. Will continue to monitor.

## 2022-05-10 VITALS
BODY MASS INDEX: 35.08 KG/M2 | HEART RATE: 82 BPM | RESPIRATION RATE: 18 BRPM | TEMPERATURE: 98.1 F | DIASTOLIC BLOOD PRESSURE: 80 MMHG | OXYGEN SATURATION: 95 % | WEIGHT: 231.48 LBS | HEIGHT: 68 IN | SYSTOLIC BLOOD PRESSURE: 147 MMHG

## 2022-05-10 LAB
INR PPP: 1.99 (ref 0.84–1.13)
PROTHROMBIN TIME: 22.6 SECONDS (ref 11.4–14.4)

## 2022-05-10 PROCEDURE — 97116 GAIT TRAINING THERAPY: CPT

## 2022-05-10 PROCEDURE — 85610 PROTHROMBIN TIME: CPT

## 2022-05-10 PROCEDURE — 25010000002 DAPTOMYCIN PER 1 MG: Performed by: INTERNAL MEDICINE

## 2022-05-10 RX ADMIN — MELOXICAM 15 MG: 7.5 TABLET ORAL at 08:13

## 2022-05-10 RX ADMIN — SERTRALINE 100 MG: 100 TABLET, FILM COATED ORAL at 08:12

## 2022-05-10 RX ADMIN — BACLOFEN 10 MG: 10 TABLET ORAL at 08:13

## 2022-05-10 RX ADMIN — LEVOTHYROXINE SODIUM 100 MCG: 0.1 TABLET ORAL at 05:28

## 2022-05-10 RX ADMIN — TRAMADOL HYDROCHLORIDE 50 MG: 50 TABLET, COATED ORAL at 05:28

## 2022-05-10 RX ADMIN — GABAPENTIN 300 MG: 300 CAPSULE ORAL at 08:12

## 2022-05-10 RX ADMIN — MIDODRINE HYDROCHLORIDE 5 MG: 5 TABLET ORAL at 08:12

## 2022-05-10 RX ADMIN — MIDODRINE HYDROCHLORIDE 5 MG: 5 TABLET ORAL at 11:55

## 2022-05-10 RX ADMIN — FAMOTIDINE 20 MG: 20 TABLET ORAL at 08:13

## 2022-05-10 RX ADMIN — Medication 10 ML: at 08:14

## 2022-05-10 RX ADMIN — DAPTOMYCIN 500 MG: 500 INJECTION, POWDER, LYOPHILIZED, FOR SOLUTION INTRAVENOUS at 11:56

## 2022-05-10 NOTE — PLAN OF CARE
Goal Outcome Evaluation:     Patient calm, cooperative and pleasant; A&Ox4, able to make her needs known clearly. Ambulated to/ from bathroom this shift, but has declined walking hallways so far this shift. Requires minimal use of PRN pain meds this shift, declining such meds when first offered. Declined bed alarm this shift, but expressed verbal commitment to use call light to allow staff to monitor her transfers and ambulation. Rested comfortably most of shift so far; mildly anxious about discharge to home due to lack of support there. No other issues at this time.     Progress: Ongoing.

## 2022-05-10 NOTE — PLAN OF CARE
Problem: Adult Inpatient Plan of Care  Goal: Plan of Care Review  Recent Flowsheet Documentation  Taken 5/10/2022 0909 by Aida Herndon, PT  Progress: improving  Plan of Care Reviewed With: patient  Outcome Evaluation: Patient increased gait distance to 400 feet with RW, step through gait pattern, SBA, limited by fatigue. Patient climbed 1 step backwards with RW with no difficulty. PT cleared patient to ambulate about the room independently, notified RN. Possible d/c home with family and outpatient PT today.   Goal Outcome Evaluation:  Plan of Care Reviewed With: patient        Progress: improving  Outcome Evaluation: Patient increased gait distance to 400 feet with RW, step through gait pattern, SBA, limited by fatigue. Patient climbed 1 step backwards with RW with no difficulty. PT cleared patient to ambulate about the room independently, notified RN. Possible d/c home with family and outpatient PT today.

## 2022-05-10 NOTE — NURSING NOTE
[place] [facility] Education Progress Note    Corrina Reis  1965 - female    Education Date:  05/10/22  Provider: Esthela Sosa RN  Location:  [facility]      Summary of Education Session:  Met with Corrina Reis to review assembly and administration of IV antibiotic.  Demonstrated with proficiency.  Reviewed side effects of medication, care and complications of central line, use of probiotics, and 24 hour availability of RN for support.  Reviewed appointment date and time to include lab arrangements and providing supplies at each appointment.    Anticipated Discharge Date:5/10/22   Follow Up Date: 5/16 virtual visit and 5/23 in office  Esthela Sosa RN, 05/10/22 - 13:03 EDT

## 2022-05-10 NOTE — THERAPY TREATMENT NOTE
Patient Name: Corrina Reis  : 1965    MRN: [id number]                              Today's Date: 5/10/2022       Admit Date: 2022    Visit Dx:     ICD-10-CM ICD-9-CM   1. S/P irrigation and debridement, left knee antibiotic spacer placement  Z98.890 V45.89   2. Infection of left knee (HCC)  M00.9 686.9     Patient Active Problem List   Diagnosis   • Autonomic orthostatic hypotension   • Infection of left knee (HCC)   • S/P irrigation and debridement, left knee antibiotic spacer placement   • Hyperlipidemia   • Hypothyroid   • History of DVT (deep vein thrombosis)   • Chronic anticoagulation     Past Medical History:   Diagnosis Date   • Anxiety    • Borderline diabetes     daily at home checks   • Deep vein thrombosis (HCC)     left leg  and    • GERD (gastroesophageal reflux disease)    • History of migraine     botox shots for migraines   • History of pulmonary embolus (PE)    • History of transfusion     autologus blood with back surgery   • Hyperlipidemia    • Hypertension    • IBS (irritable bowel syndrome)    • MRSA infection 2021    MRSA of left knee; IV antibioitcs x 4 weeks and then 6 weeks of PO antibiotics   • PONV (postoperative nausea and vomiting)    • Pyogenic arthritis of left hip (HCC) 2021   • Thyroid nodule     nodules are monitored     Past Surgical History:   Procedure Laterality Date   • APPENDECTOMY     • BACK SURGERY      Daniel removed   • CHOLECYSTECTOMY     • COLONOSCOPY     • ENDOMETRIAL ABLATION     • HYSTERECTOMY     • SPINAL CORD STIMULATOR IMPLANT     • SPINAL FUSION     • THORACIC OUTLET SURGERY      -partial removal of sternum -rib removal   • TOTAL KNEE  PROSTHESIS REMOVAL W/ SPACER INSERTION Left 2022    Procedure: IRRIGATION AND DEBRIDMENT, LEFT KNEE ANTIBIOTIC SPACER PLACEMENT;  Surgeon: Rei Sutherland MD;  Location: [facility];  Service: Orthopedics;  Laterality: Left;      General Information     Row Name  05/10/22 0909          Physical Therapy Time and Intention    Document Type therapy note (daily note)  -LR     Mode of Treatment physical therapy;individual therapy  -LR     Row Name 05/10/22 0909          General Information    Patient Profile Reviewed yes  -LR     Existing Precautions/Restrictions brace worn when out of bed;other (see comments)   knee immobilizer to L knee at all times for first 2 weeks, no L knee flexion  -LR     Barriers to Rehab medically complex  -LR     Row Name 05/10/22 0909          Cognition    Orientation Status (Cognition) oriented x 4  -LR     Row Name 05/10/22 0909          Safety Issues, Functional Mobility    Safety Issues Affecting Function (Mobility) other (see comments)  no  -LR     Impairments Affecting Function (Mobility) strength;pain;range of motion (ROM)  -LR           User Key  (r) = Recorded By, (t) = Taken By, (c) = Cosigned By    Initials Name Provider Type    LR Aida Herndon, PT Physical Therapist               Mobility     Row Name 05/10/22 0909          Bed Mobility    Supine-Sit [place] (Bed Mobility) modified independence  -LR     Sit-Supine [place] (Bed Mobility) not tested  -LR     Assistive Device (Bed Mobility) head of bed elevated  -LR     Comment, (Bed Mobility) No cues or assist required. Denied dizziness upon sitting up.  -LR     Row Name 05/10/22 0909          Transfers    Comment, (Transfers) Demonstrated correct hand placement with t/f, no LOB/unsteadiness. Adjusted knee immobilizer to L LE and correctly fit to patient prior to OOB mobility.  -LR     Row Name 05/10/22 0909          Bed-Chair Transfer    Bed-Chair [place] (Transfers) not tested  -LR     Row Name 05/10/22 0909          Sit-Stand Transfer    Sit-Stand [place] (Transfers) modified independence  -LR     Assistive Device (Sit-Stand Transfers) walker, front-wheeled  -LR     Row Name 05/10/22 0909          Gait/Stairs (Locomotion)    [place] Level (Gait) verbal  cues;standby assist  -LR     Assistive Device (Gait) walker, front-wheeled  -LR     Distance in Feet (Gait) 400  -LR     Deviations/Abnormal Patterns (Gait) bilateral deviations;kelsey decreased;gait speed decreased;stride length decreased  -LR     Bilateral Gait Deviations forward flexed posture  -LR     Left Sided Gait Deviations weight shift ability decreased  -LR     [place] Level (Stairs) verbal cues;contact guard  -LR     Assistive Device (Stairs) walker, front-wheeled  -LR     Handrail Location (Stairs) none  -LR     Number of Steps (Stairs) 1  -LR     Ascending Technique (Stairs) step-to-step  -LR     Descending Technique (Stairs) step-to-step  -LR     Comment, (Gait/Stairs) Patient ambulated with step through gait pattern at slow pace with forward flexed posture. Improved with cues to shift hips forward and shoulders back. No LOB or unsteadiness with gait. Gait limited by fatigue. Verbal cues to back up to step with RW and to use RW for UE support. Cues to step up backwards with strong LE first and down with weak LE first. No LOB or unsteadiness with stairs. PT cleared patient to ambulate independently about the room, notified RN.  -LR     Row Name 05/10/22 0909          Mobility    Extremity Weight-bearing Status left lower extremity  -LR     Left Lower Extremity (Weight-bearing Status) weight-bearing as tolerated (WBAT)  -LR           User Key  (r) = Recorded By, (t) = Taken By, (c) = Cosigned By    Initials Name Provider Type    LR Aida Herndon, [place] Physical Therapist               Obj/Interventions     Row Name 05/10/22 0909          Motor Skills    Therapeutic Exercise other (see comments)  patient declined ther ex; PT cued patient to demonstrate ankle pumps, quad sets, glute sets, hip abd, and SLR to ensure correct technique  -LR     Row Name 05/10/22 0909          Balance    Balance Assessment sitting static balance;sitting dynamic balance;standing static balance;standing dynamic  balance  -LR     Static Sitting Balance independent  -LR     Dynamic Sitting Balance independent  -LR     Position, Sitting Balance unsupported;sitting edge of bed  -LR     Static Standing Balance independent  -LR     Dynamic Standing Balance independent  -LR     Position/Device Used, Standing Balance supported;walker, rolling  -LR           User Key  (r) = Recorded By, (t) = Taken By, (c) = Cosigned By    Initials Name Provider Type    Aida Gregg, PT Physical Therapist               Goals/Plan     Row Name 05/10/22 0909          Bed Mobility Goal 1 (PT)    Activity/Assistive Device (Bed Mobility Goal 1, PT) sit to supine/supine to sit  -LR     [place] Level/Cues Needed (Bed Mobility Goal 1, PT) independent  -LR     Time Frame (Bed Mobility Goal 1, PT) long term goal (LTG);10 days  -LR     Progress/Outcomes (Bed Mobility Goal 1, PT) good progress toward goal;goal not met;discharged from [facility] Name 05/10/22 0909          Transfer Goal 1 (PT)    Activity/Assistive Device (Transfer Goal 1, PT) sit-to-stand/stand-to-sit;walker, rolling  -LR     [place] Level/Cues Needed (Transfer Goal 1, PT) independent  -LR     Time Frame (Transfer Goal 1, PT) long term goal (LTG);10 days  -LR     Progress/Outcome (Transfer Goal 1, PT) good progress toward goal;goal not met;discharged from [facility]     Row Name 05/10/22 0909          Gait Training Goal 1 (PT)    Activity/Assistive Device (Gait Training Goal 1, PT) gait (walking locomotion);walker, rolling  -LR     [place] Level (Gait Training Goal 1, PT) modified independence  -LR     Distance (Gait Training Goal 1, PT) 300 feet  -LR     Progress/Outcome (Gait Training Goal 1, PT) good progress toward goal;goal partially met;discharged from [facility]     Row Name 05/10/22 0909          Stairs Goal 1 (PT)    Activity/Assistive Device (Stairs Goal 1, PT) ascending stairs;descending stairs;step-to-step;walker, rolling  -LR      [place] Level/Cues Needed (Stairs Goal 1, PT) contact guard required  -LR     Number of Stairs (Stairs Goal 1, PT) 1  -LR     Time Frame (Stairs Goal 1, PT) long term goal (LTG);5 days  -LR     Progress/Outcome (Stairs Goal 1, PT) goal met  -LR     Row Name 05/10/22 0909          Therapy Assessment/Plan (PT)    Planned Therapy Interventions (PT) stair training  -LR           User Key  (r) = Recorded By, (t) = Taken By, (c) = Cosigned By    Initials Name Provider Type    LR Aida Herndon, PT Physical Therapist               Clinical Impression     Row Name 05/10/22 0909          Pain    Pretreatment Pain Rating 3/10  -LR     Posttreatment Pain Rating 4/10  -LR     Pain Location - Side/Orientation Left  -LR     Pain Location anterior  -LR     Pain Location - knee  -LR     Pain Intervention(s) Ambulation/increased activity;Repositioned  -LR     Additional Documentation Pain Scale: Numbers Pre/Post-Treatment (Group)  -LR     Row Name 05/10/22 0909          Plan of Care Review    Plan of Care Reviewed With patient  -LR     Progress improving  -LR     Outcome Evaluation Patient increased gait distance to 400 feet with RW, step through gait pattern, SBA, limited by fatigue. Patient climbed 1 step backwards with RW with no difficulty. PT cleared patient to ambulate about the room independently, notified RN. Possible d/c home with family and outpatient PT today.  -LR     Row Name 05/10/22 0909          Therapy Assessment/Plan (PT)    Rehab Potential (PT) good, to achieve stated therapy goals  -LR     Criteria for Skilled Interventions Met (PT) yes;meets criteria;skilled treatment is necessary  -LR     Therapy Frequency (PT) 2 times/day  -LR     Row Name 05/10/22 0909          Positioning and Restraints    Pre-Treatment Position in bed  -LR     Post Treatment Position chair  -LR     In Chair notified nsg;reclined;sitting;call light within reach;encouraged to call for assist;legs elevated  -LR           User Key   (r) = Recorded By, (t) = Taken By, (c) = Cosigned By    Initials Name Provider Type    Aida Gregg, PT Physical Therapist               Outcome Measures     Row Name 05/10/22 0909          How much help from another person do you currently need...    Turning from your back to your side while in flat bed without using bedrails? 4  -LR     Moving from lying on back to sitting on the side of a flat bed without bedrails? 4  -LR     Moving to and from a bed to a chair (including a wheelchair)? 3  -LR     Standing up from a chair using your arms (e.g., wheelchair, bedside chair)? 3  -LR     Climbing 3-5 steps with a railing? 3  -LR     To walk in hospital room? 3  -LR     AM-PAC 6 Clicks Score (PT) 20  -LR     Highest level of mobility 6 --> Walked 10 steps or more  -LR     Row Name 05/10/22 0909          Functional Assessment    Outcome Measure Options AM-PAC 6 Clicks Basic Mobility (PT)  -LR           User Key  (r) = Recorded By, (t) = Taken By, (c) = Cosigned By    Initials Name Provider Type    Aida Gregg, PT Physical Therapist                             Physical Therapy Education                 Title: PT OT SLP Therapies (Done)     Topic: Physical Therapy (Done)     Point: Mobility training (Done)     Learning Progress Summary           Patient Acceptance, E,D, VU,DU by  at 5/10/2022 0909    Comment: Educated on precautions, weight bearing status, correct fit and use of knee immobilizer, LE HEP, correct supine to sit t/f technique, correct sit<->stand t/f technique, correct gait mechanics, correct stair training technique, correct car t/f technique.    Acceptance, E,D, VU,DU by  at 5/9/2022 1526    Acceptance, E,D, VU,DU by HP at 5/8/2022 0930    Acceptance, E, NR by AS at 5/7/2022 1337    Acceptance, E, NR by AS at 5/7/2022 0823    Eager, E, VU,NR by  at 5/6/2022 1636    Comment: Reviewed safety/technique with transfers, ambulation, HEP, PT POC    Eager, E, VU,NR by SS at  5/6/2022 1626    Comment: Reviewed safety/technique with bed mobility, transfers, ambulation, HEP, KI, WB status, PT POC    Acceptance, E,TB, VU by  at 5/5/2022 1526    Acceptance, E,TB, VU by  at 5/5/2022 0940    Acceptance, E,D, VU by TED at 5/4/2022 1345    Comment: Educated on safe sequencing with bed mobility, ambulatory transfers, and gait. Reviewed HEP and knee precautions.                   Point: Home exercise program (Done)     Learning Progress Summary           Patient Acceptance, E,D, VU,DU by LR at 5/10/2022 0909    Comment: Educated on precautions, weight bearing status, correct fit and use of knee immobilizer, LE HEP, correct supine to sit t/f technique, correct sit<->stand t/f technique, correct gait mechanics, correct stair training technique, correct car t/f technique.    Acceptance, E,D, VU,DU by  at 5/9/2022 1526    Acceptance, E,D, VU,DU by  at 5/8/2022 0930    Acceptance, E, NR by AS at 5/7/2022 1337    Acceptance, E, NR by AS at 5/7/2022 0823    Eager, E, VU,NR by  at 5/6/2022 1636    Comment: Reviewed safety/technique with transfers, ambulation, HEP, PT POC    Eager, E, VU,NR by  at 5/6/2022 1626    Comment: Reviewed safety/technique with bed mobility, transfers, ambulation, HEP, KI, WB status, PT POC    Acceptance, E,TB, VU by  at 5/5/2022 1526    Acceptance, E,TB, VU by  at 5/5/2022 0940    Acceptance, E,D, VU by TED at 5/4/2022 1345    Comment: Educated on safe sequencing with bed mobility, ambulatory transfers, and gait. Reviewed HEP and knee precautions.                   Point: Body mechanics (Done)     Learning Progress Summary           Patient Acceptance, E,D, VU,DU by LR at 5/10/2022 0909    Comment: Educated on precautions, weight bearing status, correct fit and use of knee immobilizer, LE HEP, correct supine to sit t/f technique, correct sit<->stand t/f technique, correct gait mechanics, correct stair training technique, correct car t/f technique.    Acceptance,  E,D, VU,DU by  at 5/9/2022 1526    Acceptance, E,D, VU,DU by  at 5/8/2022 0930    Acceptance, E, NR by AS at 5/7/2022 1337    Acceptance, E, NR by AS at 5/7/2022 0823    Eager, E, VU,NR by SS at 5/6/2022 1636    Comment: Reviewed safety/technique with transfers, ambulation, HEP, PT POC    Eager, E, VU,NR by SS at 5/6/2022 1626    Comment: Reviewed safety/technique with bed mobility, transfers, ambulation, HEP, KI, WB status, PT POC    Acceptance, E,TB, VU by  at 5/5/2022 1526    Acceptance, E,TB, VU by  at 5/5/2022 0940    Acceptance, E,D, VU by TED at 5/4/2022 1345    Comment: Educated on safe sequencing with bed mobility, ambulatory transfers, and gait. Reviewed HEP and knee precautions.                   Point: Precautions (Done)     Learning Progress Summary           Patient Acceptance, E,D, VU,DU by  at 5/10/2022 0909    Comment: Educated on precautions, weight bearing status, correct fit and use of knee immobilizer, LE HEP, correct supine to sit t/f technique, correct sit<->stand t/f technique, correct gait mechanics, correct stair training technique, correct car t/f technique.    Acceptance, E,D, VU,DU by  at 5/9/2022 1526    Acceptance, E,D, VU,DU by  at 5/8/2022 0930    Acceptance, E, NR by AS at 5/7/2022 1337    Acceptance, E, NR by AS at 5/7/2022 0823    Eager, E, VU,NR by SS at 5/6/2022 1636    Comment: Reviewed safety/technique with transfers, ambulation, HEP, PT POC    Eager, E, VU,NR by SS at 5/6/2022 1626    Comment: Reviewed safety/technique with bed mobility, transfers, ambulation, HEP, KI, WB status, PT POC    Acceptance, E,TB, VU by  at 5/5/2022 1526    Acceptance, E,TB, VU by  at 5/5/2022 0940    Acceptance, E,D, VU by TED at 5/4/2022 2874    Comment: Educated on safe sequencing with bed mobility, ambulatory transfers, and gait. Reviewed HEP and knee precautions.                               User Key     Initials Effective Dates Name Provider Type Discipline    LR 06/16/21 -   Aida Herndon, PT Physical Therapist PT    AS 06/16/21 -  Tyra Wynn, PTA Physical Therapist Assistant PT    TED 06/16/21 -  Lev Harper, PT Physical Therapist PT    JH 09/22/20 -  Reinaldo Bridges, PT Physical Therapist PT    HP 06/01/21 -  Mona Sanchez, PT Physical Therapist PT    SS 06/01/21 -  Ludy Perez, PT Physical Therapist PT              PT Recommendation and Plan  Planned Therapy Interventions (PT): stair training  Plan of Care Reviewed With: patient  Progress: improving  Outcome Evaluation: Patient increased gait distance to 400 feet with RW, step through gait pattern, SBA, limited by fatigue. Patient climbed 1 step backwards with RW with no difficulty. PT cleared patient to ambulate about the room independently, notified RN. Possible d/c home with family and outpatient PT today.     Time Calculation:    PT Charges     Row Name 05/10/22 0909             Time Calculation    Start Time 0909  -LR      PT Received On 05/10/22  -LR      PT Goal Re-Cert Due Date 05/20/22  -LR              Timed Charges    81806 - Gait Training Minutes  13  -LR              Total Minutes    Timed Charges Total Minutes 13  -LR       Total Minutes 13  -LR            User Key  (r) = Recorded By, (t) = Taken By, (c) = Cosigned By    Initials Name Provider Type    LR Aida Herndon, PT Physical Therapist              Therapy Charges for Today     Code Description Service Date Service Provider Modifiers Qty    [id number] HC GAIT TRAINING EA 15 MIN 5/10/2022 Aida Herndon, PT GP 1          PT G-Codes  Outcome Measure Options: AM-PAC 6 Clicks Basic Mobility (PT)  AM-PAC 6 Clicks Score (PT): 20    Aida Herndon, PT  5/10/2022

## 2022-05-10 NOTE — DISCHARGE SUMMARY
"Patient Name: Corrina Reis  MRN: [id number]  : 1965  DOS: 5/10/2022    Attending: Zoila Benites MD    Primary Care Provider: Abbey Merrill MD    Date of Admission:.2022  7:03 AM    Date of Discharge:  5/10/2022    Discharge Diagnosis:   S/P irrigation and debridement, left knee antibiotic spacer placement    Autonomic orthostatic hypotension    Infection of left knee (HCC)    Hyperlipidemia    Hypothyroid    History of DVT (deep vein thrombosis)    Chronic anticoagulation  Status post PICC placement, 2022    Hospital Course    At admit:  Patient is a pleasant 56 y.o. female presented for scheduled surgery by Dr. Sutherland.  She underwent irrigation and debridement, left knee antibiotic spacer placement under spinal anesthesia.  She tolerated surgery well and was admitted for further medical management.  She reports having had a fall in 2021.  At that time she started having pain, swelling and warmth to her left knee.  At that time she underwent \"cleanout\" and was placed on a month of IV vancomycin for MRSA followed by 6 weeks of doxycycline.  She continued to have pain and intermittent swelling.  She has used a walker since her fall.     When seen postop she is doing well.  Her pain is well controlled.  She denies nausea, shortness of breath or chest pain.  She does have history of DVT and PE.        After admit:    Patient was provided pain medications as needed for pain control, along with adductor canal nerve block infusion of Ropivacaine.  Peripheral nerve block catheter has since been removed.     Adjustments were made to pain medications to optimize postop pain management. Risks and benefits of opiate medications discussed with patient. MONTEZ report was reviewed.    She was seen by PT and OT and has progressed well over her stay.    Patient used an IS for atelectasis prophylaxis and warfarin has been resumed along with mechanicals for DVT prophylaxis.    Home " medications were resumed as appropriate, and labs were monitored and remained fairly stable.     With the progress she has made, Ms. Reis is ready for DC home today.  She initially was interested in skilled nursing facility short-term, while awaiting through the weekend and for approval she has made enough improvement to where she was recommended for home discharge with home health.     Antibiotic management throughout her stay was under the care of infectious disease consultants.  Regimen for post discharge also was arranged per their orders and with the help of our .    Discussed with patient regarding plan and she shows understanding and agreement.    Patient will have HHPT following discharge.        Procedures Performed  Procedure(s):  IRRIGATION AND DEBRIDMENT, LEFT KNEE ANTIBIOTIC SPACER PLACEMENT       Pertinent Test Results:    I reviewed the patient's new clinical results.   Results from last 7 days   Lab Units 05/08/22  0705 05/07/22  0823 05/06/22  0632   WBC 10*3/mm3 5.72 6.36 6.25   HEMOGLOBIN g/dL 10.2* 10.5* 9.8*   HEMATOCRIT % 29.3* 29.8* 29.4*   PLATELETS 10*3/mm3 206 182 171     Results from last 7 days   Lab Units 05/08/22  0705 05/07/22  0822 05/06/22  0858   SODIUM mmol/L 139 137 144   POTASSIUM mmol/L 3.6 3.9 4.1   CHLORIDE mmol/L 105 103 109*   CO2 mmol/L 25.0 24.0 27.0   BUN mg/dL 16 13 14   CREATININE mg/dL 0.91 0.94 1.03*   CALCIUM mg/dL 8.9 8.8 8.8   BILIRUBIN mg/dL 0.6 0.6 0.5   ALK PHOS U/L 104 121* 77   ALT (SGPT) U/L 87* 134* 63*   AST (SGOT) U/L 76* 162* 118*   GLUCOSE mg/dL 86 109* 139*     I reviewed the patient's new imaging including images and reports.   Latest Reference Range & Units 05/10/22 10:58   Protime 11.4 - 14.4 Seconds 22.6 (H)   INR 0.84 - 1.13  1.99 (H)   (H): Data is abnormally high    Physical therapy  Progress: improving  Outcome Evaluation: Patient increased gait distance to 400 feet with RW, step through gait pattern, SBA, limited by fatigue.  "Patient climbed 1 step backwards with RW with no difficulty. PT cleared patient to ambulate about the room independently, notified RN. Possible d/c home with family and outpatient PT today  Discharge Assessment:       Visit Vitals  /82   Pulse 95   Temp 98.1 °F (36.7 °C) (Oral)   Resp 18   Ht 172.7 cm (67.99\")   Wt 105 kg (231 lb 7.7 oz)   SpO2 95%   BMI 35.21 kg/m²     Temp (24hrs), Av.1 °F (36.7 °C), Min:98 °F (36.7 °C), Max:98.2 °F (36.8 °C)      General Appearance:    Alert, cooperative, in no acute distress   Lungs:     Clear to auscultation,respirations regular, even and                   unlabored    Heart:    Regular rhythm and normal rate, normal S1 and S2   Abdomen:     Normal bowel sounds, no masses, no organomegaly, soft        non-tender, non-distended, no guarding, no rebound                 tenderness   Extremities:   CDI dressing surgical knee, Prevena suction dressing to be switched to Optifoam dressing when the battery runs out.     Pulses:   Pulses palpable and equal bilaterally   Skin:   No bleeding, bruising or rash   Neurologic:   Cranial nerves 2 - 12 grossly intact, sensation intact, Flexion and dorsiflexion intact bilateral feet.         Discharge Disposition: Home    Discharge Medications     Discharge Medications      New Medications      Instructions Start Date   acetaminophen 500 MG tablet  Commonly known as: TYLENOL   1,000 mg, Oral, Every 8 Hours, Take every 8 hours  as needed after 1 week      DAPTOmycin 500 mg in sodium chloride 0.9 % 50 mL   6 mg/kg (500 mg), Intravenous, Every 24 Hours      docusate sodium 100 MG capsule  Commonly known as: Colace   100 mg, Oral, 2 Times Daily      meloxicam 15 MG tablet  Commonly known as: MOBIC   15 mg, Oral, Daily      ondansetron 4 MG tablet  Commonly known as: Zofran   4 mg, Oral, Every 8 Hours PRN      oxyCODONE 5 MG immediate release tablet  Commonly known as: Roxicodone   5 mg, Oral, Every 4 Hours PRN      traMADol 50 MG " tablet  Commonly known as: ULTRAM   50 mg, Oral, Every 6 Hours PRN         Changes to Medications      Instructions Start Date   atorvastatin 40 MG tablet  Commonly known as: LIPITOR  What changed:   · additional instructions  · These instructions start on June 17, 2022. If you are unsure what to do until then, ask your doctor or other care provider.   40 mg, Oral, Nightly, Resume after finishing Daptomycin   Start Date: June 17, 2022     sulindac 150 MG tablet  Commonly known as: CLINORIL  What changed:   · additional instructions  · These instructions start on May 20, 2022. If you are unsure what to do until then, ask your doctor or other care provider.   150 mg, Oral, Daily, May resume after Meloxicam is finished.   Start Date: May 20, 2022        Continue These Medications      Instructions Start Date   amitriptyline 50 MG tablet  Commonly known as: ELAVIL   1 tablet, Oral, Every Night at Bedtime      baclofen 10 MG tablet  Commonly known as: LIORESAL   10 mg, Oral, 3 Times Daily      Botox 200 units reconstituted solution  Generic drug: OnabotulinumtoxinA   Botox 200 unit injection   INJECT 200 UNITS INTRAMUSCULARLY EVERY 3 MONTHS      cyclobenzaprine 5 MG tablet  Commonly known as: FLEXERIL   5 mg, Oral, 3 Times Daily PRN      diphenhydrAMINE 25 mg capsule  Commonly known as: BENADRYL   25 mg, Oral, As Needed      famotidine 20 MG tablet  Commonly known as: PEPCID   20 mg, Oral, 2 times daily      gabapentin 300 MG capsule  Commonly known as: NEURONTIN   300 mg, Oral, 3 Times Daily      hydroCHLOROthiazide 12.5 MG capsule  Commonly known as: MICROZIDE   1 capsule, Oral, Daily, Stopped for surgery per pt      levothyroxine 100 MCG tablet  Commonly known as: SYNTHROID, LEVOTHROID   100 mcg, Oral, Every Early Morning      lidocaine 5 %  Commonly known as: LIDODERM   1 patch, Transdermal, As Needed      midodrine 5 MG tablet  Commonly known as: PROAMATINE   5 mg, Oral, 3 Times Daily      sertraline 100 MG  tablet  Commonly known as: ZOLOFT   100 mg, Oral, Daily      warfarin 3 MG tablet  Commonly known as: COUMADIN   3 mg, Oral, Nightly, Stopped for surgery         Stop These Medications    Diclofenac Sodium 1 % gel gel  Commonly known as: VOLTAREN            Discharge Diet: Resume prehospital diet    Activity at Discharge:   Knee immobilizer for first 2 weeks no knee flexion for the first 2 weeks  Follow-up Appointments  Rei Bee MD per his orders  [facility] per their orders      Discharge took over 30 min      Dragon disclaimer:  Part of this encounter note is an electronic transcription/translation of spoken language to printed text. The electronic translation of spoken language may permit erroneous, or at times, nonsensical words or phrases to be inadvertently transcribed; Although I have reviewed the note for such errors, some may still exist.       Zoila Benites MD  05/10/22  09:54 EDT

## 2022-05-14 LAB
BACTERIA SPEC ANAEROBE CULT: NORMAL

## 2022-05-23 ENCOUNTER — LAB (OUTPATIENT)
Dept: LAB | Facility: HOSPITAL | Age: 57
End: 2022-05-23

## 2022-05-23 ENCOUNTER — TRANSCRIBE ORDERS (OUTPATIENT)
Dept: PREADMISSION TESTING | Facility: HOSPITAL | Age: 57
End: 2022-05-23

## 2022-05-23 ENCOUNTER — TRANSCRIBE ORDERS (OUTPATIENT)
Dept: LAB | Facility: HOSPITAL | Age: 57
End: 2022-05-23

## 2022-05-23 ENCOUNTER — HOSPITAL ENCOUNTER (OUTPATIENT)
Dept: CARDIOLOGY | Facility: HOSPITAL | Age: 57
Discharge: HOME | End: 2022-05-23

## 2022-05-23 DIAGNOSIS — R22.43 LOCALIZED SWELLING, MASS AND LUMP, LOWER LIMB, BILATERAL: ICD-10-CM

## 2022-05-23 DIAGNOSIS — L03.116 CELLULITIS OF LEFT FOOT: ICD-10-CM

## 2022-05-23 DIAGNOSIS — M00.062 STAPHYLOCOCCAL ARTHRITIS OF LEFT KNEE: ICD-10-CM

## 2022-05-23 DIAGNOSIS — R22.43 LOCALIZED SWELLING, MASS AND LUMP, LOWER LIMB, BILATERAL: Primary | ICD-10-CM

## 2022-05-23 DIAGNOSIS — T84.54XD INFECTION OF TOTAL LEFT KNEE REPLACEMENT, SUBSEQUENT ENCOUNTER: ICD-10-CM

## 2022-05-23 DIAGNOSIS — T84.54XD INFECTION OF TOTAL LEFT KNEE REPLACEMENT, SUBSEQUENT ENCOUNTER: Primary | ICD-10-CM

## 2022-05-23 LAB
ALBUMIN SERPL-MCNC: 4.4 G/DL (ref 3.5–5.2)
ALBUMIN/GLOB SERPL: 1.5 G/DL
ALP SERPL-CCNC: 119 U/L (ref 39–117)
ALT SERPL W P-5'-P-CCNC: 16 U/L (ref 1–33)
ANION GAP SERPL CALCULATED.3IONS-SCNC: 13 MMOL/L (ref 5–15)
AST SERPL-CCNC: 22 U/L (ref 1–32)
BASOPHILS # BLD AUTO: 0.11 10*3/MM3 (ref 0–0.2)
BASOPHILS NFR BLD AUTO: 1.2 % (ref 0–1.5)
BILIRUB SERPL-MCNC: 0.5 MG/DL (ref 0–1.2)
BUN SERPL-MCNC: 19 MG/DL (ref 6–20)
BUN/CREAT SERPL: 11.4 (ref 7–25)
CALCIUM SPEC-SCNC: 9.7 MG/DL (ref 8.6–10.5)
CHLORIDE SERPL-SCNC: 104 MMOL/L (ref 98–107)
CK SERPL-CCNC: 152 U/L (ref 20–180)
CO2 SERPL-SCNC: 24 MMOL/L (ref 22–29)
CREAT SERPL-MCNC: 1.66 MG/DL (ref 0.57–1)
CRP SERPL-MCNC: 5.89 MG/DL (ref 0–0.5)
DEPRECATED RDW RBC AUTO: 46.1 FL (ref 37–54)
EGFRCR SERPLBLD CKD-EPI 2021: 35.4 ML/MIN/1.73
EOSINOPHIL # BLD AUTO: 0.54 10*3/MM3 (ref 0–0.4)
EOSINOPHIL NFR BLD AUTO: 5.9 % (ref 0.3–6.2)
ERYTHROCYTE [DISTWIDTH] IN BLOOD BY AUTOMATED COUNT: 12.8 % (ref 12.3–15.4)
ERYTHROCYTE [SEDIMENTATION RATE] IN BLOOD: 46 MM/HR (ref 0–30)
GLOBULIN UR ELPH-MCNC: 2.9 GM/DL
GLUCOSE SERPL-MCNC: 110 MG/DL (ref 65–99)
HCT VFR BLD AUTO: 32.2 % (ref 34–46.6)
HGB BLD-MCNC: 10.5 G/DL (ref 12–15.9)
IMM GRANULOCYTES # BLD AUTO: 0.03 10*3/MM3 (ref 0–0.05)
IMM GRANULOCYTES NFR BLD AUTO: 0.3 % (ref 0–0.5)
LYMPHOCYTES # BLD AUTO: 1.91 10*3/MM3 (ref 0.7–3.1)
LYMPHOCYTES NFR BLD AUTO: 20.8 % (ref 19.6–45.3)
MCH RBC QN AUTO: 31.8 PG (ref 26.6–33)
MCHC RBC AUTO-ENTMCNC: 32.6 G/DL (ref 31.5–35.7)
MCV RBC AUTO: 97.6 FL (ref 79–97)
MONOCYTES # BLD AUTO: 0.56 10*3/MM3 (ref 0.1–0.9)
MONOCYTES NFR BLD AUTO: 6.1 % (ref 5–12)
NEUTROPHILS NFR BLD AUTO: 6.04 10*3/MM3 (ref 1.7–7)
NEUTROPHILS NFR BLD AUTO: 65.7 % (ref 42.7–76)
NRBC BLD AUTO-RTO: 0 /100 WBC (ref 0–0.2)
PLATELET # BLD AUTO: 351 10*3/MM3 (ref 140–450)
PMV BLD AUTO: 9.5 FL (ref 6–12)
POTASSIUM SERPL-SCNC: 4.6 MMOL/L (ref 3.5–5.2)
PROT SERPL-MCNC: 7.3 G/DL (ref 6–8.5)
RBC # BLD AUTO: 3.3 10*6/MM3 (ref 3.77–5.28)
SODIUM SERPL-SCNC: 141 MMOL/L (ref 136–145)
WBC NRBC COR # BLD: 9.19 10*3/MM3 (ref 3.4–10.8)

## 2022-05-23 PROCEDURE — 93970 EXTREMITY STUDY: CPT | Performed by: INTERNAL MEDICINE

## 2022-05-23 PROCEDURE — 86140 C-REACTIVE PROTEIN: CPT

## 2022-05-23 PROCEDURE — 82550 ASSAY OF CK (CPK): CPT

## 2022-05-23 PROCEDURE — 36415 COLL VENOUS BLD VENIPUNCTURE: CPT

## 2022-05-23 PROCEDURE — 93970 EXTREMITY STUDY: CPT

## 2022-05-23 PROCEDURE — 85652 RBC SED RATE AUTOMATED: CPT

## 2022-05-23 PROCEDURE — 80053 COMPREHEN METABOLIC PANEL: CPT

## 2022-05-23 PROCEDURE — 85025 COMPLETE CBC W/AUTO DIFF WBC: CPT

## 2022-05-24 LAB
BH CV LOWER VASCULAR LEFT COMMON FEMORAL AUGMENT: NORMAL
BH CV LOWER VASCULAR LEFT COMMON FEMORAL COMPRESS: NORMAL
BH CV LOWER VASCULAR LEFT COMMON FEMORAL PHASIC: NORMAL
BH CV LOWER VASCULAR LEFT COMMON FEMORAL SPONT: NORMAL
BH CV LOWER VASCULAR LEFT DISTAL FEMORAL COMPRESS: NORMAL
BH CV LOWER VASCULAR LEFT GASTRONEMIUS COMPRESS: NORMAL
BH CV LOWER VASCULAR LEFT GREATER SAPH AK COMPRESS: NORMAL
BH CV LOWER VASCULAR LEFT GREATER SAPH BK COMPRESS: NORMAL
BH CV LOWER VASCULAR LEFT LESSER SAPH COMPRESS: NORMAL
BH CV LOWER VASCULAR LEFT MID FEMORAL AUGMENT: NORMAL
BH CV LOWER VASCULAR LEFT MID FEMORAL COMPRESS: NORMAL
BH CV LOWER VASCULAR LEFT MID FEMORAL PHASIC: NORMAL
BH CV LOWER VASCULAR LEFT MID FEMORAL SPONT: NORMAL
BH CV LOWER VASCULAR LEFT PERONEAL COMPRESS: NORMAL
BH CV LOWER VASCULAR LEFT POPLITEAL AUGMENT: NORMAL
BH CV LOWER VASCULAR LEFT POPLITEAL COMPRESS: NORMAL
BH CV LOWER VASCULAR LEFT POPLITEAL PHASIC: NORMAL
BH CV LOWER VASCULAR LEFT POPLITEAL SPONT: NORMAL
BH CV LOWER VASCULAR LEFT POSTERIOR TIBIAL COMPRESS: NORMAL
BH CV LOWER VASCULAR LEFT PROFUNDA FEMORAL COMPRESS: NORMAL
BH CV LOWER VASCULAR LEFT PROXIMAL FEMORAL COMPRESS: NORMAL
BH CV LOWER VASCULAR LEFT SAPHENOFEMORAL JUNCTION COMPRESS: NORMAL
BH CV LOWER VASCULAR RIGHT COMMON FEMORAL AUGMENT: NORMAL
BH CV LOWER VASCULAR RIGHT COMMON FEMORAL COMPRESS: NORMAL
BH CV LOWER VASCULAR RIGHT COMMON FEMORAL PHASIC: NORMAL
BH CV LOWER VASCULAR RIGHT COMMON FEMORAL SPONT: NORMAL
BH CV LOWER VASCULAR RIGHT DISTAL FEMORAL COMPRESS: NORMAL
BH CV LOWER VASCULAR RIGHT GASTRONEMIUS COMPRESS: NORMAL
BH CV LOWER VASCULAR RIGHT GREATER SAPH AK COMPRESS: NORMAL
BH CV LOWER VASCULAR RIGHT GREATER SAPH BK COMPRESS: NORMAL
BH CV LOWER VASCULAR RIGHT LESSER SAPH COMPRESS: NORMAL
BH CV LOWER VASCULAR RIGHT MID FEMORAL AUGMENT: NORMAL
BH CV LOWER VASCULAR RIGHT MID FEMORAL COMPRESS: NORMAL
BH CV LOWER VASCULAR RIGHT MID FEMORAL PHASIC: NORMAL
BH CV LOWER VASCULAR RIGHT MID FEMORAL SPONT: NORMAL
BH CV LOWER VASCULAR RIGHT PERONEAL COMPRESS: NORMAL
BH CV LOWER VASCULAR RIGHT POPLITEAL AUGMENT: NORMAL
BH CV LOWER VASCULAR RIGHT POPLITEAL COMPRESS: NORMAL
BH CV LOWER VASCULAR RIGHT POPLITEAL PHASIC: NORMAL
BH CV LOWER VASCULAR RIGHT POPLITEAL SPONT: NORMAL
BH CV LOWER VASCULAR RIGHT POSTERIOR TIBIAL COMPRESS: NORMAL
BH CV LOWER VASCULAR RIGHT PROFUNDA FEMORAL COMPRESS: NORMAL
BH CV LOWER VASCULAR RIGHT PROXIMAL FEMORAL COMPRESS: NORMAL
BH CV LOWER VASCULAR RIGHT SAPHENOFEMORAL JUNCTION COMPRESS: NORMAL
MAXIMAL PREDICTED HEART RATE: 161 BPM
STRESS TARGET HR: 137 BPM

## 2022-05-31 ENCOUNTER — TRANSCRIBE ORDERS (OUTPATIENT)
Dept: LAB | Facility: HOSPITAL | Age: 57
End: 2022-05-31

## 2022-05-31 ENCOUNTER — LAB (OUTPATIENT)
Dept: LAB | Facility: HOSPITAL | Age: 57
End: 2022-05-31

## 2022-05-31 DIAGNOSIS — T84.54XD INFECTION OF TOTAL LEFT KNEE REPLACEMENT, SUBSEQUENT ENCOUNTER: Primary | ICD-10-CM

## 2022-05-31 DIAGNOSIS — L03.116 CELLULITIS OF LEFT FOOT: ICD-10-CM

## 2022-05-31 DIAGNOSIS — M00.062 STAPHYLOCOCCAL ARTHRITIS OF LEFT KNEE: ICD-10-CM

## 2022-05-31 LAB
ALBUMIN SERPL-MCNC: 4.5 G/DL (ref 3.5–5.2)
ALBUMIN/GLOB SERPL: 1.4 G/DL
ALP SERPL-CCNC: 123 U/L (ref 39–117)
ALT SERPL W P-5'-P-CCNC: 16 U/L (ref 1–33)
ANION GAP SERPL CALCULATED.3IONS-SCNC: 13 MMOL/L (ref 5–15)
AST SERPL-CCNC: 24 U/L (ref 1–32)
BASOPHILS # BLD AUTO: 0.14 10*3/MM3 (ref 0–0.2)
BASOPHILS NFR BLD AUTO: 1.4 % (ref 0–1.5)
BILIRUB SERPL-MCNC: 0.4 MG/DL (ref 0–1.2)
BUN SERPL-MCNC: 25 MG/DL (ref 6–20)
BUN/CREAT SERPL: 11.5 (ref 7–25)
CALCIUM SPEC-SCNC: 10 MG/DL (ref 8.6–10.5)
CHLORIDE SERPL-SCNC: 102 MMOL/L (ref 98–107)
CK SERPL-CCNC: 383 U/L (ref 20–180)
CO2 SERPL-SCNC: 24 MMOL/L (ref 22–29)
CREAT SERPL-MCNC: 2.18 MG/DL (ref 0.57–1)
CRP SERPL-MCNC: 4.22 MG/DL (ref 0–0.5)
DEPRECATED RDW RBC AUTO: 44.7 FL (ref 37–54)
EGFRCR SERPLBLD CKD-EPI 2021: 25.5 ML/MIN/1.73
EOSINOPHIL # BLD AUTO: 0.74 10*3/MM3 (ref 0–0.4)
EOSINOPHIL NFR BLD AUTO: 7.5 % (ref 0.3–6.2)
ERYTHROCYTE [DISTWIDTH] IN BLOOD BY AUTOMATED COUNT: 13 % (ref 12.3–15.4)
ERYTHROCYTE [SEDIMENTATION RATE] IN BLOOD: 33 MM/HR (ref 0–30)
GLOBULIN UR ELPH-MCNC: 3.2 GM/DL
GLUCOSE SERPL-MCNC: 116 MG/DL (ref 65–99)
HCT VFR BLD AUTO: 32.5 % (ref 34–46.6)
HGB BLD-MCNC: 10.8 G/DL (ref 12–15.9)
IMM GRANULOCYTES # BLD AUTO: 0.03 10*3/MM3 (ref 0–0.05)
IMM GRANULOCYTES NFR BLD AUTO: 0.3 % (ref 0–0.5)
LYMPHOCYTES # BLD AUTO: 2.54 10*3/MM3 (ref 0.7–3.1)
LYMPHOCYTES NFR BLD AUTO: 25.9 % (ref 19.6–45.3)
MCH RBC QN AUTO: 31.7 PG (ref 26.6–33)
MCHC RBC AUTO-ENTMCNC: 33.2 G/DL (ref 31.5–35.7)
MCV RBC AUTO: 95.3 FL (ref 79–97)
MONOCYTES # BLD AUTO: 0.69 10*3/MM3 (ref 0.1–0.9)
MONOCYTES NFR BLD AUTO: 7 % (ref 5–12)
NEUTROPHILS NFR BLD AUTO: 5.68 10*3/MM3 (ref 1.7–7)
NEUTROPHILS NFR BLD AUTO: 57.9 % (ref 42.7–76)
NRBC BLD AUTO-RTO: 0 /100 WBC (ref 0–0.2)
PLATELET # BLD AUTO: 294 10*3/MM3 (ref 140–450)
PMV BLD AUTO: 9.3 FL (ref 6–12)
POTASSIUM SERPL-SCNC: 4.5 MMOL/L (ref 3.5–5.2)
PROT SERPL-MCNC: 7.7 G/DL (ref 6–8.5)
RBC # BLD AUTO: 3.41 10*6/MM3 (ref 3.77–5.28)
SODIUM SERPL-SCNC: 139 MMOL/L (ref 136–145)
WBC NRBC COR # BLD: 9.82 10*3/MM3 (ref 3.4–10.8)

## 2022-05-31 PROCEDURE — 82550 ASSAY OF CK (CPK): CPT | Performed by: INTERNAL MEDICINE

## 2022-05-31 PROCEDURE — 36415 COLL VENOUS BLD VENIPUNCTURE: CPT | Performed by: INTERNAL MEDICINE

## 2022-05-31 PROCEDURE — 85652 RBC SED RATE AUTOMATED: CPT | Performed by: INTERNAL MEDICINE

## 2022-05-31 PROCEDURE — 85025 COMPLETE CBC W/AUTO DIFF WBC: CPT | Performed by: INTERNAL MEDICINE

## 2022-05-31 PROCEDURE — 80053 COMPREHEN METABOLIC PANEL: CPT | Performed by: INTERNAL MEDICINE

## 2022-05-31 PROCEDURE — 86140 C-REACTIVE PROTEIN: CPT | Performed by: INTERNAL MEDICINE

## 2022-06-08 ENCOUNTER — TRANSCRIBE ORDERS (OUTPATIENT)
Dept: LAB | Facility: HOSPITAL | Age: 57
End: 2022-06-08

## 2022-06-08 ENCOUNTER — LAB (OUTPATIENT)
Dept: LAB | Facility: HOSPITAL | Age: 57
End: 2022-06-08

## 2022-06-08 DIAGNOSIS — M00.062 STAPHYLOCOCCAL ARTHRITIS OF LEFT KNEE: ICD-10-CM

## 2022-06-08 DIAGNOSIS — L03.116 CELLULITIS OF LEFT FOOT: ICD-10-CM

## 2022-06-08 DIAGNOSIS — T84.54XD INFECTION OF TOTAL LEFT KNEE REPLACEMENT, SUBSEQUENT ENCOUNTER: Primary | ICD-10-CM

## 2022-06-08 DIAGNOSIS — T84.54XD INFECTION OF TOTAL LEFT KNEE REPLACEMENT, SUBSEQUENT ENCOUNTER: ICD-10-CM

## 2022-06-08 LAB
ALBUMIN SERPL-MCNC: 4.4 G/DL (ref 3.5–5.2)
ALBUMIN/GLOB SERPL: 1.3 G/DL
ALP SERPL-CCNC: 120 U/L (ref 39–117)
ALT SERPL W P-5'-P-CCNC: 18 U/L (ref 1–33)
ANION GAP SERPL CALCULATED.3IONS-SCNC: 12 MMOL/L (ref 5–15)
AST SERPL-CCNC: 26 U/L (ref 1–32)
BASOPHILS # BLD AUTO: 0.13 10*3/MM3 (ref 0–0.2)
BASOPHILS NFR BLD AUTO: 1.4 % (ref 0–1.5)
BILIRUB SERPL-MCNC: 0.6 MG/DL (ref 0–1.2)
BUN SERPL-MCNC: 23 MG/DL (ref 6–20)
BUN/CREAT SERPL: 13.5 (ref 7–25)
CALCIUM SPEC-SCNC: 9.8 MG/DL (ref 8.6–10.5)
CHLORIDE SERPL-SCNC: 98 MMOL/L (ref 98–107)
CK SERPL-CCNC: 301 U/L (ref 20–180)
CO2 SERPL-SCNC: 28 MMOL/L (ref 22–29)
CREAT SERPL-MCNC: 1.71 MG/DL (ref 0.57–1)
CRP SERPL-MCNC: 4.79 MG/DL (ref 0–0.5)
DEPRECATED RDW RBC AUTO: 43.9 FL (ref 37–54)
EGFRCR SERPLBLD CKD-EPI 2021: 34.2 ML/MIN/1.73
EOSINOPHIL # BLD AUTO: 0.19 10*3/MM3 (ref 0–0.4)
EOSINOPHIL NFR BLD AUTO: 2 % (ref 0.3–6.2)
ERYTHROCYTE [DISTWIDTH] IN BLOOD BY AUTOMATED COUNT: 13 % (ref 12.3–15.4)
ERYTHROCYTE [SEDIMENTATION RATE] IN BLOOD: 46 MM/HR (ref 0–30)
GLOBULIN UR ELPH-MCNC: 3.4 GM/DL
GLUCOSE SERPL-MCNC: 127 MG/DL (ref 65–99)
HCT VFR BLD AUTO: 32.2 % (ref 34–46.6)
HGB BLD-MCNC: 10.6 G/DL (ref 12–15.9)
IMM GRANULOCYTES # BLD AUTO: 0.02 10*3/MM3 (ref 0–0.05)
IMM GRANULOCYTES NFR BLD AUTO: 0.2 % (ref 0–0.5)
LYMPHOCYTES # BLD AUTO: 2.78 10*3/MM3 (ref 0.7–3.1)
LYMPHOCYTES NFR BLD AUTO: 29.3 % (ref 19.6–45.3)
MCH RBC QN AUTO: 30.5 PG (ref 26.6–33)
MCHC RBC AUTO-ENTMCNC: 32.9 G/DL (ref 31.5–35.7)
MCV RBC AUTO: 92.8 FL (ref 79–97)
MONOCYTES # BLD AUTO: 0.6 10*3/MM3 (ref 0.1–0.9)
MONOCYTES NFR BLD AUTO: 6.3 % (ref 5–12)
NEUTROPHILS NFR BLD AUTO: 5.76 10*3/MM3 (ref 1.7–7)
NEUTROPHILS NFR BLD AUTO: 60.8 % (ref 42.7–76)
NRBC BLD AUTO-RTO: 0 /100 WBC (ref 0–0.2)
PLATELET # BLD AUTO: 315 10*3/MM3 (ref 140–450)
PMV BLD AUTO: 10 FL (ref 6–12)
POTASSIUM SERPL-SCNC: 3.7 MMOL/L (ref 3.5–5.2)
PROT SERPL-MCNC: 7.8 G/DL (ref 6–8.5)
RBC # BLD AUTO: 3.47 10*6/MM3 (ref 3.77–5.28)
SODIUM SERPL-SCNC: 138 MMOL/L (ref 136–145)
WBC NRBC COR # BLD: 9.48 10*3/MM3 (ref 3.4–10.8)

## 2022-06-08 PROCEDURE — 82550 ASSAY OF CK (CPK): CPT

## 2022-06-08 PROCEDURE — 80053 COMPREHEN METABOLIC PANEL: CPT

## 2022-06-08 PROCEDURE — 85025 COMPLETE CBC W/AUTO DIFF WBC: CPT

## 2022-06-08 PROCEDURE — 85652 RBC SED RATE AUTOMATED: CPT

## 2022-06-08 PROCEDURE — 36415 COLL VENOUS BLD VENIPUNCTURE: CPT

## 2022-06-08 PROCEDURE — 86140 C-REACTIVE PROTEIN: CPT

## 2022-06-15 ENCOUNTER — LAB (OUTPATIENT)
Dept: LAB | Facility: HOSPITAL | Age: 57
End: 2022-06-15

## 2022-06-15 ENCOUNTER — TRANSCRIBE ORDERS (OUTPATIENT)
Dept: LAB | Facility: HOSPITAL | Age: 57
End: 2022-06-15

## 2022-06-15 DIAGNOSIS — L03.116 CELLULITIS OF LEFT FOOT: ICD-10-CM

## 2022-06-15 DIAGNOSIS — T84.54XD INFECTION OF TOTAL LEFT KNEE REPLACEMENT, SUBSEQUENT ENCOUNTER: Primary | ICD-10-CM

## 2022-06-15 DIAGNOSIS — M00.062 STAPHYLOCOCCAL ARTHRITIS OF LEFT KNEE: ICD-10-CM

## 2022-06-15 DIAGNOSIS — T84.54XD INFECTION OF TOTAL LEFT KNEE REPLACEMENT, SUBSEQUENT ENCOUNTER: ICD-10-CM

## 2022-06-15 LAB
ALBUMIN SERPL-MCNC: 4.3 G/DL (ref 3.5–5.2)
ALBUMIN/GLOB SERPL: 1.4 G/DL
ALP SERPL-CCNC: 113 U/L (ref 39–117)
ALT SERPL W P-5'-P-CCNC: 18 U/L (ref 1–33)
ANION GAP SERPL CALCULATED.3IONS-SCNC: 9 MMOL/L (ref 5–15)
AST SERPL-CCNC: 26 U/L (ref 1–32)
BASOPHILS # BLD AUTO: 0.1 10*3/MM3 (ref 0–0.2)
BASOPHILS NFR BLD AUTO: 1.4 % (ref 0–1.5)
BILIRUB SERPL-MCNC: 0.4 MG/DL (ref 0–1.2)
BUN SERPL-MCNC: 14 MG/DL (ref 6–20)
BUN/CREAT SERPL: 9.2 (ref 7–25)
CALCIUM SPEC-SCNC: 10 MG/DL (ref 8.6–10.5)
CHLORIDE SERPL-SCNC: 104 MMOL/L (ref 98–107)
CK SERPL-CCNC: 330 U/L (ref 20–180)
CO2 SERPL-SCNC: 26 MMOL/L (ref 22–29)
CREAT SERPL-MCNC: 1.53 MG/DL (ref 0.57–1)
CRP SERPL-MCNC: 1.87 MG/DL (ref 0–0.5)
DEPRECATED RDW RBC AUTO: 44.9 FL (ref 37–54)
EGFRCR SERPLBLD CKD-EPI 2021: 39 ML/MIN/1.73
EOSINOPHIL # BLD AUTO: 0.38 10*3/MM3 (ref 0–0.4)
EOSINOPHIL NFR BLD AUTO: 5.2 % (ref 0.3–6.2)
ERYTHROCYTE [DISTWIDTH] IN BLOOD BY AUTOMATED COUNT: 13.2 % (ref 12.3–15.4)
ERYTHROCYTE [SEDIMENTATION RATE] IN BLOOD: 42 MM/HR (ref 0–30)
FUNGUS WND CULT: NORMAL
FUNGUS WND CULT: NORMAL
GLOBULIN UR ELPH-MCNC: 3 GM/DL
GLUCOSE SERPL-MCNC: 114 MG/DL (ref 65–99)
HCT VFR BLD AUTO: 32.2 % (ref 34–46.6)
HGB BLD-MCNC: 10.6 G/DL (ref 12–15.9)
IMM GRANULOCYTES # BLD AUTO: 0.01 10*3/MM3 (ref 0–0.05)
IMM GRANULOCYTES NFR BLD AUTO: 0.1 % (ref 0–0.5)
LYMPHOCYTES # BLD AUTO: 2.22 10*3/MM3 (ref 0.7–3.1)
LYMPHOCYTES NFR BLD AUTO: 30.5 % (ref 19.6–45.3)
MCH RBC QN AUTO: 31 PG (ref 26.6–33)
MCHC RBC AUTO-ENTMCNC: 32.9 G/DL (ref 31.5–35.7)
MCV RBC AUTO: 94.2 FL (ref 79–97)
MONOCYTES # BLD AUTO: 0.58 10*3/MM3 (ref 0.1–0.9)
MONOCYTES NFR BLD AUTO: 8 % (ref 5–12)
MYCOBACTERIUM SPEC CULT: NORMAL
NEUTROPHILS NFR BLD AUTO: 4 10*3/MM3 (ref 1.7–7)
NEUTROPHILS NFR BLD AUTO: 54.8 % (ref 42.7–76)
NIGHT BLUE STAIN TISS: NORMAL
NRBC BLD AUTO-RTO: 0 /100 WBC (ref 0–0.2)
PLATELET # BLD AUTO: 339 10*3/MM3 (ref 140–450)
PMV BLD AUTO: 10.1 FL (ref 6–12)
POTASSIUM SERPL-SCNC: 4.2 MMOL/L (ref 3.5–5.2)
PROT SERPL-MCNC: 7.3 G/DL (ref 6–8.5)
RBC # BLD AUTO: 3.42 10*6/MM3 (ref 3.77–5.28)
SODIUM SERPL-SCNC: 139 MMOL/L (ref 136–145)
WBC NRBC COR # BLD: 7.29 10*3/MM3 (ref 3.4–10.8)

## 2022-06-15 PROCEDURE — 36415 COLL VENOUS BLD VENIPUNCTURE: CPT

## 2022-06-15 PROCEDURE — 80053 COMPREHEN METABOLIC PANEL: CPT

## 2022-06-15 PROCEDURE — 82550 ASSAY OF CK (CPK): CPT

## 2022-06-15 PROCEDURE — 85652 RBC SED RATE AUTOMATED: CPT

## 2022-06-15 PROCEDURE — 86140 C-REACTIVE PROTEIN: CPT

## 2022-06-15 PROCEDURE — 85025 COMPLETE CBC W/AUTO DIFF WBC: CPT

## 2022-06-16 LAB — FUNGUS WND CULT: NORMAL

## 2022-06-27 ENCOUNTER — PRE-ADMISSION TESTING (OUTPATIENT)
Dept: PREADMISSION TESTING | Facility: HOSPITAL | Age: 57
End: 2022-06-27

## 2022-06-27 VITALS — HEIGHT: 69 IN | WEIGHT: 224.87 LBS | BODY MASS INDEX: 33.31 KG/M2

## 2022-06-27 LAB
25(OH)D3 SERPL-MCNC: 30 NG/ML (ref 30–100)
ALBUMIN SERPL-MCNC: 4.5 G/DL (ref 3.5–5.2)
ALBUMIN/GLOB SERPL: 1.7 G/DL
ALP SERPL-CCNC: 98 U/L (ref 39–117)
ALT SERPL W P-5'-P-CCNC: 11 U/L (ref 1–33)
ANION GAP SERPL CALCULATED.3IONS-SCNC: 10 MMOL/L (ref 5–15)
APTT PPP: 30.2 SECONDS (ref 22–39)
AST SERPL-CCNC: 18 U/L (ref 1–32)
BASOPHILS # BLD AUTO: 0.07 10*3/MM3 (ref 0–0.2)
BASOPHILS NFR BLD AUTO: 1.2 % (ref 0–1.5)
BILIRUB SERPL-MCNC: 0.4 MG/DL (ref 0–1.2)
BUN SERPL-MCNC: 16 MG/DL (ref 6–20)
BUN/CREAT SERPL: 10.7 (ref 7–25)
CALCIUM SPEC-SCNC: 9.6 MG/DL (ref 8.6–10.5)
CHLORIDE SERPL-SCNC: 104 MMOL/L (ref 98–107)
CO2 SERPL-SCNC: 26 MMOL/L (ref 22–29)
CREAT SERPL-MCNC: 1.5 MG/DL (ref 0.57–1)
CRP SERPL-MCNC: 1.1 MG/DL (ref 0–0.5)
DEPRECATED RDW RBC AUTO: 46 FL (ref 37–54)
EGFRCR SERPLBLD CKD-EPI 2021: 40 ML/MIN/1.73
EOSINOPHIL # BLD AUTO: 0.24 10*3/MM3 (ref 0–0.4)
EOSINOPHIL NFR BLD AUTO: 4.2 % (ref 0.3–6.2)
ERYTHROCYTE [DISTWIDTH] IN BLOOD BY AUTOMATED COUNT: 13.2 % (ref 12.3–15.4)
ERYTHROCYTE [SEDIMENTATION RATE] IN BLOOD: 35 MM/HR (ref 0–30)
GLOBULIN UR ELPH-MCNC: 2.6 GM/DL
GLUCOSE SERPL-MCNC: 100 MG/DL (ref 65–99)
HBA1C MFR BLD: 5.1 % (ref 4.8–5.6)
HCT VFR BLD AUTO: 33.6 % (ref 34–46.6)
HGB BLD-MCNC: 10.7 G/DL (ref 12–15.9)
IMM GRANULOCYTES # BLD AUTO: 0.01 10*3/MM3 (ref 0–0.05)
IMM GRANULOCYTES NFR BLD AUTO: 0.2 % (ref 0–0.5)
INR PPP: 1.23 (ref 0.84–1.13)
LYMPHOCYTES # BLD AUTO: 1.8 10*3/MM3 (ref 0.7–3.1)
LYMPHOCYTES NFR BLD AUTO: 31.6 % (ref 19.6–45.3)
MCH RBC QN AUTO: 30.4 PG (ref 26.6–33)
MCHC RBC AUTO-ENTMCNC: 31.8 G/DL (ref 31.5–35.7)
MCV RBC AUTO: 95.5 FL (ref 79–97)
MONOCYTES # BLD AUTO: 0.45 10*3/MM3 (ref 0.1–0.9)
MONOCYTES NFR BLD AUTO: 7.9 % (ref 5–12)
MRSA DNA SPEC QL NAA+PROBE: NEGATIVE
NEUTROPHILS NFR BLD AUTO: 3.12 10*3/MM3 (ref 1.7–7)
NEUTROPHILS NFR BLD AUTO: 54.9 % (ref 42.7–76)
NRBC BLD AUTO-RTO: 0 /100 WBC (ref 0–0.2)
PLATELET # BLD AUTO: 289 10*3/MM3 (ref 140–450)
PMV BLD AUTO: 10.2 FL (ref 6–12)
POTASSIUM SERPL-SCNC: 4.2 MMOL/L (ref 3.5–5.2)
PROT SERPL-MCNC: 7.1 G/DL (ref 6–8.5)
PROTHROMBIN TIME: 15.4 SECONDS (ref 11.4–14.4)
RBC # BLD AUTO: 3.52 10*6/MM3 (ref 3.77–5.28)
SARS-COV-2 RNA PNL SPEC NAA+PROBE: NOT DETECTED
SODIUM SERPL-SCNC: 140 MMOL/L (ref 136–145)
WBC NRBC COR # BLD: 5.69 10*3/MM3 (ref 3.4–10.8)

## 2022-06-27 PROCEDURE — U0004 COV-19 TEST NON-CDC HGH THRU: HCPCS

## 2022-06-27 PROCEDURE — C9803 HOPD COVID-19 SPEC COLLECT: HCPCS

## 2022-06-27 PROCEDURE — 82985 ASSAY OF GLYCATED PROTEIN: CPT

## 2022-06-27 PROCEDURE — 85025 COMPLETE CBC W/AUTO DIFF WBC: CPT

## 2022-06-27 PROCEDURE — 85730 THROMBOPLASTIN TIME PARTIAL: CPT | Performed by: ORTHOPAEDIC SURGERY

## 2022-06-27 PROCEDURE — 85610 PROTHROMBIN TIME: CPT | Performed by: ORTHOPAEDIC SURGERY

## 2022-06-27 PROCEDURE — 87641 MR-STAPH DNA AMP PROBE: CPT

## 2022-06-27 PROCEDURE — G0480 DRUG TEST DEF 1-7 CLASSES: HCPCS

## 2022-06-27 PROCEDURE — 85652 RBC SED RATE AUTOMATED: CPT

## 2022-06-27 PROCEDURE — 86140 C-REACTIVE PROTEIN: CPT | Performed by: ORTHOPAEDIC SURGERY

## 2022-06-27 PROCEDURE — 83036 HEMOGLOBIN GLYCOSYLATED A1C: CPT | Performed by: ORTHOPAEDIC SURGERY

## 2022-06-27 PROCEDURE — 80053 COMPREHEN METABOLIC PANEL: CPT | Performed by: ORTHOPAEDIC SURGERY

## 2022-06-27 PROCEDURE — 82306 VITAMIN D 25 HYDROXY: CPT

## 2022-06-27 PROCEDURE — 36415 COLL VENOUS BLD VENIPUNCTURE: CPT

## 2022-06-27 ASSESSMENT — KOOS JR
KOOS JR SCORE: 57.14
KOOS JR SCORE: 12

## 2022-06-28 ENCOUNTER — ANESTHESIA EVENT (OUTPATIENT)
Dept: PERIOP | Facility: HOSPITAL | Age: 57
End: 2022-06-28

## 2022-06-28 LAB — FRUCTOSAMINE SERPL-SCNC: 245 UMOL/L (ref 0–285)

## 2022-06-28 RX ORDER — SODIUM CHLORIDE, SODIUM LACTATE, POTASSIUM CHLORIDE, CALCIUM CHLORIDE 600; 310; 30; 20 MG/100ML; MG/100ML; MG/100ML; MG/100ML
9 INJECTION, SOLUTION INTRAVENOUS CONTINUOUS
Status: CANCELLED | OUTPATIENT
Start: 2022-06-28

## 2022-06-28 RX ORDER — FAMOTIDINE 10 MG/ML
20 INJECTION, SOLUTION INTRAVENOUS ONCE
Status: CANCELLED | OUTPATIENT
Start: 2022-06-28 | End: 2022-06-28

## 2022-06-29 ENCOUNTER — ANESTHESIA EVENT CONVERTED (OUTPATIENT)
Dept: ANESTHESIOLOGY | Facility: HOSPITAL | Age: 57
End: 2022-06-29

## 2022-06-29 ENCOUNTER — HOSPITAL ENCOUNTER (INPATIENT)
Facility: HOSPITAL | Age: 57
LOS: 1 days | Discharge: HOME HEALTH | End: 2022-06-30
Attending: ORTHOPAEDIC SURGERY | Admitting: ORTHOPAEDIC SURGERY

## 2022-06-29 ENCOUNTER — APPOINTMENT (OUTPATIENT)
Dept: GENERAL RADIOLOGY | Facility: HOSPITAL | Age: 57
End: 2022-06-29

## 2022-06-29 ENCOUNTER — ANESTHESIA (OUTPATIENT)
Dept: PERIOP | Facility: HOSPITAL | Age: 57
End: 2022-06-29

## 2022-06-29 DIAGNOSIS — Z96.652 STATUS POST REVISION OF TOTAL REPLACEMENT OF LEFT KNEE: Primary | ICD-10-CM

## 2022-06-29 DIAGNOSIS — Z96.652 HISTORY OF REVISION OF TOTAL REPLACEMENT OF LEFT KNEE JOINT: ICD-10-CM

## 2022-06-29 PROBLEM — Z98.890 H/O: KNEE SURGERY: Status: ACTIVE | Noted: 2022-06-29

## 2022-06-29 PROBLEM — N28.9 RENAL INSUFFICIENCY: Status: ACTIVE | Noted: 2022-06-29

## 2022-06-29 PROBLEM — D64.9 ANEMIA: Status: ACTIVE | Noted: 2022-06-29

## 2022-06-29 LAB
INR PPP: 1.24 (ref 0.84–1.13)
PROTHROMBIN TIME: 15.5 SECONDS (ref 11.4–14.4)

## 2022-06-29 PROCEDURE — C1776 JOINT DEVICE (IMPLANTABLE): HCPCS | Performed by: ORTHOPAEDIC SURGERY

## 2022-06-29 PROCEDURE — 25010000002 CEFAZOLIN IN DEXTROSE 2-4 GM/100ML-% SOLUTION: Performed by: ORTHOPAEDIC SURGERY

## 2022-06-29 PROCEDURE — 87075 CULTR BACTERIA EXCEPT BLOOD: CPT | Performed by: ORTHOPAEDIC SURGERY

## 2022-06-29 PROCEDURE — C1713 ANCHOR/SCREW BN/BN,TIS/BN: HCPCS | Performed by: ORTHOPAEDIC SURGERY

## 2022-06-29 PROCEDURE — 25010000002 VANCOMYCIN 10 G RECONSTITUTED SOLUTION: Performed by: ORTHOPAEDIC SURGERY

## 2022-06-29 PROCEDURE — 25010000002 KETOROLAC TROMETHAMINE PER 15 MG: Performed by: ORTHOPAEDIC SURGERY

## 2022-06-29 PROCEDURE — 25010000002 PROPOFOL 10 MG/ML EMULSION: Performed by: ANESTHESIOLOGY

## 2022-06-29 PROCEDURE — 25010000002 ROPIVACAINE PER 1 MG

## 2022-06-29 PROCEDURE — 87205 SMEAR GRAM STAIN: CPT | Performed by: ORTHOPAEDIC SURGERY

## 2022-06-29 PROCEDURE — 85610 PROTHROMBIN TIME: CPT | Performed by: INTERNAL MEDICINE

## 2022-06-29 PROCEDURE — 87015 SPECIMEN INFECT AGNT CONCNTJ: CPT | Performed by: ORTHOPAEDIC SURGERY

## 2022-06-29 PROCEDURE — C1755 CATHETER, INTRASPINAL: HCPCS | Performed by: ORTHOPAEDIC SURGERY

## 2022-06-29 PROCEDURE — 87176 TISSUE HOMOGENIZATION CULTR: CPT | Performed by: ORTHOPAEDIC SURGERY

## 2022-06-29 PROCEDURE — 0SPD0JZ REMOVAL OF SYNTHETIC SUBSTITUTE FROM LEFT KNEE JOINT, OPEN APPROACH: ICD-10-PCS | Performed by: ORTHOPAEDIC SURGERY

## 2022-06-29 PROCEDURE — 87102 FUNGUS ISOLATION CULTURE: CPT | Performed by: ORTHOPAEDIC SURGERY

## 2022-06-29 PROCEDURE — 27487 REVISE/REPLACE KNEE JOINT: CPT

## 2022-06-29 PROCEDURE — 87206 SMEAR FLUORESCENT/ACID STAI: CPT | Performed by: ORTHOPAEDIC SURGERY

## 2022-06-29 PROCEDURE — 0SRD069 REPLACEMENT OF LEFT KNEE JOINT WITH OXIDIZED ZIRCONIUM ON POLYETHYLENE SYNTHETIC SUBSTITUTE, CEMENTED, OPEN APPROACH: ICD-10-PCS | Performed by: ORTHOPAEDIC SURGERY

## 2022-06-29 PROCEDURE — L1830 KO IMMOB CANVAS LONG PRE OTS: HCPCS | Performed by: ORTHOPAEDIC SURGERY

## 2022-06-29 PROCEDURE — 87116 MYCOBACTERIA CULTURE: CPT | Performed by: ORTHOPAEDIC SURGERY

## 2022-06-29 PROCEDURE — 25010000002 DEXAMETHASONE PER 1 MG: Performed by: ANESTHESIOLOGY

## 2022-06-29 PROCEDURE — 25010000002 CLONIDINE PER 1 MG: Performed by: ORTHOPAEDIC SURGERY

## 2022-06-29 PROCEDURE — 87070 CULTURE OTHR SPECIMN AEROBIC: CPT | Performed by: ORTHOPAEDIC SURGERY

## 2022-06-29 PROCEDURE — 25010000002 ONDANSETRON PER 1 MG: Performed by: ANESTHESIOLOGY

## 2022-06-29 PROCEDURE — 73560 X-RAY EXAM OF KNEE 1 OR 2: CPT

## 2022-06-29 PROCEDURE — 25010000002 VANCOMYCIN 1 G RECONSTITUTED SOLUTION: Performed by: ORTHOPAEDIC SURGERY

## 2022-06-29 DEVICE — TOTAL STABILIZER+ TIBIAL INSERT
Type: IMPLANTABLE DEVICE | Site: KNEE | Status: FUNCTIONAL
Brand: TRIATHLON

## 2022-06-29 DEVICE — FEMORAL DISTAL AUGMENT
Type: IMPLANTABLE DEVICE | Site: KNEE | Status: FUNCTIONAL
Brand: TRIATHLON

## 2022-06-29 DEVICE — CEMENTED STEM
Type: IMPLANTABLE DEVICE | Site: KNEE | Status: FUNCTIONAL
Brand: TRIATHLON

## 2022-06-29 DEVICE — ASYMMETRIC PATELLA
Type: IMPLANTABLE DEVICE | Site: KNEE | Status: FUNCTIONAL
Brand: TRIATHLON

## 2022-06-29 DEVICE — TRITANIUM TIBIAL SYMMETRIC CONE AUGMENT
Type: IMPLANTABLE DEVICE | Site: KNEE | Status: FUNCTIONAL
Brand: TRIATHLON

## 2022-06-29 DEVICE — DEV CONTRL TISS STRATAFIX SPIRAL PDO BIDIR 1 36X36CM: Type: IMPLANTABLE DEVICE | Site: KNEE | Status: FUNCTIONAL

## 2022-06-29 DEVICE — CMT BONE SIMPLEX/P TMYCIN FDOS 10PK: Type: IMPLANTABLE DEVICE | Site: KNEE | Status: FUNCTIONAL

## 2022-06-29 DEVICE — STEM EXTENDER
Type: IMPLANTABLE DEVICE | Site: KNEE | Status: FUNCTIONAL
Brand: TRIATHLON

## 2022-06-29 DEVICE — UNIVERSAL TIBIAL BASEPLATE
Type: IMPLANTABLE DEVICE | Site: KNEE | Status: FUNCTIONAL
Brand: TRIATHLON

## 2022-06-29 DEVICE — TOTAL STABILIZER FEMORAL COMPONENT
Type: IMPLANTABLE DEVICE | Site: KNEE | Status: FUNCTIONAL
Brand: TRIATHLON

## 2022-06-29 RX ORDER — DIPHENHYDRAMINE HCL 25 MG
25 CAPSULE ORAL EVERY 6 HOURS PRN
Status: DISCONTINUED | OUTPATIENT
Start: 2022-06-29 | End: 2022-06-30 | Stop reason: HOSPADM

## 2022-06-29 RX ORDER — PROMETHAZINE HYDROCHLORIDE 25 MG/1
25 TABLET ORAL ONCE AS NEEDED
Status: DISCONTINUED | OUTPATIENT
Start: 2022-06-29 | End: 2022-06-29 | Stop reason: HOSPADM

## 2022-06-29 RX ORDER — TRANEXAMIC ACID 10 MG/ML
1000 INJECTION, SOLUTION INTRAVENOUS ONCE
Status: COMPLETED | OUTPATIENT
Start: 2022-06-29 | End: 2022-06-29

## 2022-06-29 RX ORDER — DOCUSATE SODIUM 100 MG/1
100 CAPSULE, LIQUID FILLED ORAL 2 TIMES DAILY
Status: DISCONTINUED | OUTPATIENT
Start: 2022-06-29 | End: 2022-06-30 | Stop reason: HOSPADM

## 2022-06-29 RX ORDER — ROPIVACAINE HYDROCHLORIDE 2 MG/ML
8 INJECTION, SOLUTION EPIDURAL; INFILTRATION CONTINUOUS
Status: DISCONTINUED | OUTPATIENT
Start: 2022-06-29 | End: 2022-06-30

## 2022-06-29 RX ORDER — DIPHENHYDRAMINE HYDROCHLORIDE 50 MG/ML
25 INJECTION INTRAMUSCULAR; INTRAVENOUS EVERY 6 HOURS PRN
Status: DISCONTINUED | OUTPATIENT
Start: 2022-06-29 | End: 2022-06-30 | Stop reason: HOSPADM

## 2022-06-29 RX ORDER — TRAMADOL HYDROCHLORIDE 50 MG/1
50 TABLET ORAL EVERY 8 HOURS PRN
Status: DISCONTINUED | OUTPATIENT
Start: 2022-06-29 | End: 2022-06-30 | Stop reason: HOSPADM

## 2022-06-29 RX ORDER — BUPIVACAINE HYDROCHLORIDE 5 MG/ML
INJECTION, SOLUTION PERINEURAL
Status: COMPLETED | OUTPATIENT
Start: 2022-06-29 | End: 2022-06-29

## 2022-06-29 RX ORDER — HYDROMORPHONE HYDROCHLORIDE 1 MG/ML
0.5 INJECTION, SOLUTION INTRAMUSCULAR; INTRAVENOUS; SUBCUTANEOUS
Status: DISCONTINUED | OUTPATIENT
Start: 2022-06-29 | End: 2022-06-29 | Stop reason: HOSPADM

## 2022-06-29 RX ORDER — SODIUM CHLORIDE 9 MG/ML
INJECTION, SOLUTION INTRAVENOUS CONTINUOUS PRN
Status: DISCONTINUED | OUTPATIENT
Start: 2022-06-29 | End: 2022-06-29 | Stop reason: SURG

## 2022-06-29 RX ORDER — ONDANSETRON 4 MG/1
4 TABLET, FILM COATED ORAL EVERY 6 HOURS PRN
Status: DISCONTINUED | OUTPATIENT
Start: 2022-06-29 | End: 2022-06-30 | Stop reason: HOSPADM

## 2022-06-29 RX ORDER — EPHEDRINE SULFATE 50 MG/ML
INJECTION, SOLUTION INTRAVENOUS AS NEEDED
Status: DISCONTINUED | OUTPATIENT
Start: 2022-06-29 | End: 2022-06-29 | Stop reason: SURG

## 2022-06-29 RX ORDER — SODIUM CHLORIDE 0.9 % (FLUSH) 0.9 %
3-10 SYRINGE (ML) INJECTION AS NEEDED
Status: DISCONTINUED | OUTPATIENT
Start: 2022-06-29 | End: 2022-06-29 | Stop reason: HOSPADM

## 2022-06-29 RX ORDER — MELOXICAM 7.5 MG/1
15 TABLET ORAL DAILY
Status: DISCONTINUED | OUTPATIENT
Start: 2022-06-30 | End: 2022-06-30 | Stop reason: HOSPADM

## 2022-06-29 RX ORDER — NALOXONE HCL 0.4 MG/ML
0.1 VIAL (ML) INJECTION
Status: DISCONTINUED | OUTPATIENT
Start: 2022-06-29 | End: 2022-06-30 | Stop reason: HOSPADM

## 2022-06-29 RX ORDER — MELOXICAM 15 MG/1
15 TABLET ORAL ONCE
Status: COMPLETED | OUTPATIENT
Start: 2022-06-29 | End: 2022-06-29

## 2022-06-29 RX ORDER — ROPIVACAINE HYDROCHLORIDE 2 MG/ML
INJECTION, SOLUTION EPIDURAL; INFILTRATION; PERINEURAL
Status: COMPLETED
Start: 2022-06-29 | End: 2022-06-29

## 2022-06-29 RX ORDER — HYDROMORPHONE HYDROCHLORIDE 1 MG/ML
0.5 INJECTION, SOLUTION INTRAMUSCULAR; INTRAVENOUS; SUBCUTANEOUS
Status: DISCONTINUED | OUTPATIENT
Start: 2022-06-29 | End: 2022-06-30 | Stop reason: HOSPADM

## 2022-06-29 RX ORDER — AMITRIPTYLINE HYDROCHLORIDE 50 MG/1
50 TABLET, FILM COATED ORAL NIGHTLY
Status: DISCONTINUED | OUTPATIENT
Start: 2022-06-29 | End: 2022-06-30 | Stop reason: HOSPADM

## 2022-06-29 RX ORDER — CEFAZOLIN SODIUM 2 G/100ML
2 INJECTION, SOLUTION INTRAVENOUS EVERY 8 HOURS
Status: COMPLETED | OUTPATIENT
Start: 2022-06-29 | End: 2022-06-30

## 2022-06-29 RX ORDER — BUPIVACAINE HCL/0.9 % NACL/PF 0.125 %
PLASTIC BAG, INJECTION (ML) EPIDURAL AS NEEDED
Status: DISCONTINUED | OUTPATIENT
Start: 2022-06-29 | End: 2022-06-29 | Stop reason: SURG

## 2022-06-29 RX ORDER — MIDAZOLAM HYDROCHLORIDE 1 MG/ML
1 INJECTION INTRAMUSCULAR; INTRAVENOUS
Status: DISCONTINUED | OUTPATIENT
Start: 2022-06-29 | End: 2022-06-29 | Stop reason: HOSPADM

## 2022-06-29 RX ORDER — SODIUM CHLORIDE 0.9 % (FLUSH) 0.9 %
10 SYRINGE (ML) INJECTION EVERY 12 HOURS SCHEDULED
Status: DISCONTINUED | OUTPATIENT
Start: 2022-06-29 | End: 2022-06-29 | Stop reason: HOSPADM

## 2022-06-29 RX ORDER — LEVOTHYROXINE SODIUM 0.1 MG/1
100 TABLET ORAL
Status: DISCONTINUED | OUTPATIENT
Start: 2022-06-30 | End: 2022-06-30 | Stop reason: HOSPADM

## 2022-06-29 RX ORDER — OXYCODONE HYDROCHLORIDE 5 MG/1
10 TABLET ORAL EVERY 4 HOURS PRN
Status: DISCONTINUED | OUTPATIENT
Start: 2022-06-29 | End: 2022-06-30 | Stop reason: HOSPADM

## 2022-06-29 RX ORDER — SODIUM CHLORIDE 0.9 % (FLUSH) 0.9 %
10 SYRINGE (ML) INJECTION AS NEEDED
Status: DISCONTINUED | OUTPATIENT
Start: 2022-06-29 | End: 2022-06-29 | Stop reason: HOSPADM

## 2022-06-29 RX ORDER — MAGNESIUM HYDROXIDE 1200 MG/15ML
LIQUID ORAL AS NEEDED
Status: DISCONTINUED | OUTPATIENT
Start: 2022-06-29 | End: 2022-06-29 | Stop reason: HOSPADM

## 2022-06-29 RX ORDER — ONDANSETRON 2 MG/ML
4 INJECTION INTRAMUSCULAR; INTRAVENOUS ONCE AS NEEDED
Status: DISCONTINUED | OUTPATIENT
Start: 2022-06-29 | End: 2022-06-29 | Stop reason: HOSPADM

## 2022-06-29 RX ORDER — WARFARIN SODIUM 3 MG/1
3 TABLET ORAL
Status: DISCONTINUED | OUTPATIENT
Start: 2022-06-30 | End: 2022-06-30 | Stop reason: HOSPADM

## 2022-06-29 RX ORDER — HYDRALAZINE HYDROCHLORIDE 20 MG/ML
5 INJECTION INTRAMUSCULAR; INTRAVENOUS
Status: DISCONTINUED | OUTPATIENT
Start: 2022-06-29 | End: 2022-06-29 | Stop reason: HOSPADM

## 2022-06-29 RX ORDER — ONDANSETRON 2 MG/ML
4 INJECTION INTRAMUSCULAR; INTRAVENOUS EVERY 6 HOURS PRN
Status: DISCONTINUED | OUTPATIENT
Start: 2022-06-29 | End: 2022-06-30 | Stop reason: SDUPTHER

## 2022-06-29 RX ORDER — CYCLOBENZAPRINE HCL 5 MG
5 TABLET ORAL 3 TIMES DAILY PRN
Status: DISCONTINUED | OUTPATIENT
Start: 2022-06-29 | End: 2022-06-30 | Stop reason: HOSPADM

## 2022-06-29 RX ORDER — ONDANSETRON 2 MG/ML
INJECTION INTRAMUSCULAR; INTRAVENOUS AS NEEDED
Status: DISCONTINUED | OUTPATIENT
Start: 2022-06-29 | End: 2022-06-29 | Stop reason: SURG

## 2022-06-29 RX ORDER — LABETALOL HYDROCHLORIDE 5 MG/ML
10 INJECTION, SOLUTION INTRAVENOUS EVERY 4 HOURS PRN
Status: DISCONTINUED | OUTPATIENT
Start: 2022-06-29 | End: 2022-06-30 | Stop reason: HOSPADM

## 2022-06-29 RX ORDER — FAMOTIDINE 20 MG/1
20 TABLET, FILM COATED ORAL
Status: DISCONTINUED | OUTPATIENT
Start: 2022-06-29 | End: 2022-06-30 | Stop reason: HOSPADM

## 2022-06-29 RX ORDER — CEFAZOLIN SODIUM 2 G/100ML
2 INJECTION, SOLUTION INTRAVENOUS ONCE
Status: COMPLETED | OUTPATIENT
Start: 2022-06-29 | End: 2022-06-29

## 2022-06-29 RX ORDER — VANCOMYCIN HYDROCHLORIDE 1 G/20ML
INJECTION, POWDER, LYOPHILIZED, FOR SOLUTION INTRAVENOUS AS NEEDED
Status: DISCONTINUED | OUTPATIENT
Start: 2022-06-29 | End: 2022-06-29 | Stop reason: HOSPADM

## 2022-06-29 RX ORDER — SODIUM CHLORIDE 0.9 % (FLUSH) 0.9 %
3 SYRINGE (ML) INJECTION EVERY 12 HOURS SCHEDULED
Status: DISCONTINUED | OUTPATIENT
Start: 2022-06-29 | End: 2022-06-29 | Stop reason: HOSPADM

## 2022-06-29 RX ORDER — NALOXONE HCL 0.4 MG/ML
0.4 VIAL (ML) INJECTION AS NEEDED
Status: DISCONTINUED | OUTPATIENT
Start: 2022-06-29 | End: 2022-06-29 | Stop reason: HOSPADM

## 2022-06-29 RX ORDER — DROPERIDOL 2.5 MG/ML
0.62 INJECTION, SOLUTION INTRAMUSCULAR; INTRAVENOUS
Status: DISCONTINUED | OUTPATIENT
Start: 2022-06-29 | End: 2022-06-29 | Stop reason: HOSPADM

## 2022-06-29 RX ORDER — SODIUM CHLORIDE 9 MG/ML
9 INJECTION, SOLUTION INTRAVENOUS ONCE
Status: COMPLETED | OUTPATIENT
Start: 2022-06-29 | End: 2022-06-29

## 2022-06-29 RX ORDER — KETOROLAC TROMETHAMINE 30 MG/ML
15 INJECTION, SOLUTION INTRAMUSCULAR; INTRAVENOUS EVERY 6 HOURS PRN
Status: DISCONTINUED | OUTPATIENT
Start: 2022-06-29 | End: 2022-06-30 | Stop reason: HOSPADM

## 2022-06-29 RX ORDER — PREGABALIN 75 MG/1
75 CAPSULE ORAL ONCE
Status: COMPLETED | OUTPATIENT
Start: 2022-06-29 | End: 2022-06-29

## 2022-06-29 RX ORDER — ACETAMINOPHEN 500 MG
1000 TABLET ORAL EVERY 8 HOURS
Status: DISCONTINUED | OUTPATIENT
Start: 2022-06-29 | End: 2022-06-30 | Stop reason: HOSPADM

## 2022-06-29 RX ORDER — GABAPENTIN 300 MG/1
300 CAPSULE ORAL 3 TIMES DAILY
Status: DISCONTINUED | OUTPATIENT
Start: 2022-06-29 | End: 2022-06-30 | Stop reason: HOSPADM

## 2022-06-29 RX ORDER — LIDOCAINE HYDROCHLORIDE 10 MG/ML
0.5 INJECTION, SOLUTION EPIDURAL; INFILTRATION; INTRACAUDAL; PERINEURAL ONCE AS NEEDED
Status: COMPLETED | OUTPATIENT
Start: 2022-06-29 | End: 2022-06-29

## 2022-06-29 RX ORDER — SODIUM CHLORIDE, SODIUM LACTATE, POTASSIUM CHLORIDE, CALCIUM CHLORIDE 600; 310; 30; 20 MG/100ML; MG/100ML; MG/100ML; MG/100ML
100 INJECTION, SOLUTION INTRAVENOUS CONTINUOUS
Status: DISCONTINUED | OUTPATIENT
Start: 2022-06-29 | End: 2022-06-30 | Stop reason: HOSPADM

## 2022-06-29 RX ORDER — DROPERIDOL 2.5 MG/ML
0.62 INJECTION, SOLUTION INTRAMUSCULAR; INTRAVENOUS ONCE AS NEEDED
Status: DISCONTINUED | OUTPATIENT
Start: 2022-06-29 | End: 2022-06-29 | Stop reason: HOSPADM

## 2022-06-29 RX ORDER — ATORVASTATIN CALCIUM 40 MG/1
40 TABLET, FILM COATED ORAL NIGHTLY
Status: DISCONTINUED | OUTPATIENT
Start: 2022-06-29 | End: 2022-06-30 | Stop reason: HOSPADM

## 2022-06-29 RX ORDER — HYDROCODONE BITARTRATE AND ACETAMINOPHEN 5; 325 MG/1; MG/1
1 TABLET ORAL ONCE AS NEEDED
Status: DISCONTINUED | OUTPATIENT
Start: 2022-06-29 | End: 2022-06-29 | Stop reason: HOSPADM

## 2022-06-29 RX ORDER — ONDANSETRON 2 MG/ML
4 INJECTION INTRAMUSCULAR; INTRAVENOUS EVERY 6 HOURS PRN
Status: DISCONTINUED | OUTPATIENT
Start: 2022-06-29 | End: 2022-06-30 | Stop reason: HOSPADM

## 2022-06-29 RX ORDER — ACETAMINOPHEN 500 MG
1000 TABLET ORAL ONCE
Status: COMPLETED | OUTPATIENT
Start: 2022-06-29 | End: 2022-06-29

## 2022-06-29 RX ORDER — SERTRALINE HYDROCHLORIDE 100 MG/1
100 TABLET, FILM COATED ORAL DAILY
Status: DISCONTINUED | OUTPATIENT
Start: 2022-06-29 | End: 2022-06-30 | Stop reason: HOSPADM

## 2022-06-29 RX ORDER — FAMOTIDINE 20 MG/1
20 TABLET, FILM COATED ORAL ONCE
Status: COMPLETED | OUTPATIENT
Start: 2022-06-29 | End: 2022-06-29

## 2022-06-29 RX ORDER — OXYCODONE HYDROCHLORIDE 5 MG/1
5 TABLET ORAL EVERY 4 HOURS PRN
Status: DISCONTINUED | OUTPATIENT
Start: 2022-06-29 | End: 2022-06-30 | Stop reason: HOSPADM

## 2022-06-29 RX ORDER — FENTANYL CITRATE 50 UG/ML
50 INJECTION, SOLUTION INTRAMUSCULAR; INTRAVENOUS
Status: DISCONTINUED | OUTPATIENT
Start: 2022-06-29 | End: 2022-06-29 | Stop reason: HOSPADM

## 2022-06-29 RX ORDER — BUPIVACAINE HYDROCHLORIDE 2.5 MG/ML
INJECTION, SOLUTION EPIDURAL; INFILTRATION; INTRACAUDAL
Status: COMPLETED | OUTPATIENT
Start: 2022-06-29 | End: 2022-06-29

## 2022-06-29 RX ORDER — MIDODRINE HYDROCHLORIDE 5 MG/1
5 TABLET ORAL
Status: DISCONTINUED | OUTPATIENT
Start: 2022-06-29 | End: 2022-06-30 | Stop reason: HOSPADM

## 2022-06-29 RX ORDER — IPRATROPIUM BROMIDE AND ALBUTEROL SULFATE 2.5; .5 MG/3ML; MG/3ML
3 SOLUTION RESPIRATORY (INHALATION) ONCE AS NEEDED
Status: DISCONTINUED | OUTPATIENT
Start: 2022-06-29 | End: 2022-06-29 | Stop reason: HOSPADM

## 2022-06-29 RX ORDER — PROMETHAZINE HYDROCHLORIDE 25 MG/1
25 SUPPOSITORY RECTAL ONCE AS NEEDED
Status: DISCONTINUED | OUTPATIENT
Start: 2022-06-29 | End: 2022-06-29 | Stop reason: HOSPADM

## 2022-06-29 RX ORDER — LABETALOL HYDROCHLORIDE 5 MG/ML
5 INJECTION, SOLUTION INTRAVENOUS
Status: DISCONTINUED | OUTPATIENT
Start: 2022-06-29 | End: 2022-06-29 | Stop reason: HOSPADM

## 2022-06-29 RX ORDER — MEPERIDINE HYDROCHLORIDE 25 MG/ML
12.5 INJECTION INTRAMUSCULAR; INTRAVENOUS; SUBCUTANEOUS
Status: DISCONTINUED | OUTPATIENT
Start: 2022-06-29 | End: 2022-06-29 | Stop reason: HOSPADM

## 2022-06-29 RX ORDER — BACLOFEN 10 MG/1
10 TABLET ORAL 3 TIMES DAILY
Status: DISCONTINUED | OUTPATIENT
Start: 2022-06-29 | End: 2022-06-30 | Stop reason: HOSPADM

## 2022-06-29 RX ORDER — DEXAMETHASONE SODIUM PHOSPHATE 4 MG/ML
INJECTION, SOLUTION INTRA-ARTICULAR; INTRALESIONAL; INTRAMUSCULAR; INTRAVENOUS; SOFT TISSUE AS NEEDED
Status: DISCONTINUED | OUTPATIENT
Start: 2022-06-29 | End: 2022-06-29 | Stop reason: SURG

## 2022-06-29 RX ADMIN — SODIUM CHLORIDE, POTASSIUM CHLORIDE, SODIUM LACTATE AND CALCIUM CHLORIDE 100 ML/HR: 600; 310; 30; 20 INJECTION, SOLUTION INTRAVENOUS at 18:16

## 2022-06-29 RX ADMIN — SODIUM CHLORIDE: 9 INJECTION, SOLUTION INTRAVENOUS at 15:25

## 2022-06-29 RX ADMIN — TRANEXAMIC ACID 1000 MG: 10 INJECTION, SOLUTION INTRAVENOUS at 15:12

## 2022-06-29 RX ADMIN — Medication 200 MCG: at 14:40

## 2022-06-29 RX ADMIN — CEFAZOLIN SODIUM 2 G: 2 INJECTION, SOLUTION INTRAVENOUS at 21:32

## 2022-06-29 RX ADMIN — VANCOMYCIN HYDROCHLORIDE 1500 MG: 10 INJECTION, POWDER, LYOPHILIZED, FOR SOLUTION INTRAVENOUS at 12:34

## 2022-06-29 RX ADMIN — CEFAZOLIN SODIUM 2 G: 2 INJECTION, SOLUTION INTRAVENOUS at 13:28

## 2022-06-29 RX ADMIN — Medication 100 MCG: at 14:26

## 2022-06-29 RX ADMIN — MUPIROCIN 1 APPLICATION: 20 OINTMENT TOPICAL at 12:29

## 2022-06-29 RX ADMIN — PREGABALIN 75 MG: 75 CAPSULE ORAL at 12:29

## 2022-06-29 RX ADMIN — MELOXICAM 15 MG: 15 TABLET ORAL at 12:29

## 2022-06-29 RX ADMIN — DOCUSATE SODIUM 100 MG: 100 CAPSULE, LIQUID FILLED ORAL at 21:32

## 2022-06-29 RX ADMIN — Medication 100 MCG: at 14:04

## 2022-06-29 RX ADMIN — ROPIVACAINE HYDROCHLORIDE 8 ML/HR: 2 INJECTION, SOLUTION EPIDURAL; INFILTRATION at 16:51

## 2022-06-29 RX ADMIN — BUPIVACAINE HYDROCHLORIDE 20 ML: 2.5 INJECTION, SOLUTION EPIDURAL; INFILTRATION; INTRACAUDAL at 12:50

## 2022-06-29 RX ADMIN — EPHEDRINE SULFATE 5 MG: 50 INJECTION INTRAVENOUS at 13:51

## 2022-06-29 RX ADMIN — FAMOTIDINE 20 MG: 20 TABLET ORAL at 12:29

## 2022-06-29 RX ADMIN — Medication 200 MCG: at 14:51

## 2022-06-29 RX ADMIN — ONDANSETRON 4 MG: 2 INJECTION INTRAMUSCULAR; INTRAVENOUS at 13:32

## 2022-06-29 RX ADMIN — ATORVASTATIN CALCIUM 40 MG: 40 TABLET, FILM COATED ORAL at 21:32

## 2022-06-29 RX ADMIN — PROPOFOL 100 MCG/KG/MIN: 10 INJECTION, EMULSION INTRAVENOUS at 13:20

## 2022-06-29 RX ADMIN — Medication 100 MCG: at 14:32

## 2022-06-29 RX ADMIN — DEXAMETHASONE SODIUM PHOSPHATE 8 MG: 4 INJECTION, SOLUTION INTRA-ARTICULAR; INTRALESIONAL; INTRAMUSCULAR; INTRAVENOUS; SOFT TISSUE at 13:32

## 2022-06-29 RX ADMIN — Medication 200 MCG: at 14:11

## 2022-06-29 RX ADMIN — SODIUM CHLORIDE 9 ML/HR: 9 INJECTION, SOLUTION INTRAVENOUS at 12:24

## 2022-06-29 RX ADMIN — BUPIVACAINE HYDROCHLORIDE 2 ML: 5 INJECTION, SOLUTION PERINEURAL at 13:23

## 2022-06-29 RX ADMIN — TRANEXAMIC ACID 1000 MG: 10 INJECTION, SOLUTION INTRAVENOUS at 13:32

## 2022-06-29 RX ADMIN — MIDODRINE HYDROCHLORIDE 5 MG: 5 TABLET ORAL at 18:16

## 2022-06-29 RX ADMIN — GABAPENTIN 300 MG: 300 CAPSULE ORAL at 21:32

## 2022-06-29 RX ADMIN — ACETAMINOPHEN 1000 MG: 500 TABLET ORAL at 12:29

## 2022-06-29 RX ADMIN — Medication 200 MCG: at 15:38

## 2022-06-29 RX ADMIN — AMITRIPTYLINE HYDROCHLORIDE 50 MG: 50 TABLET, FILM COATED ORAL at 21:32

## 2022-06-29 RX ADMIN — LIDOCAINE HYDROCHLORIDE 0.2 ML: 10 INJECTION, SOLUTION EPIDURAL; INFILTRATION; INTRACAUDAL; PERINEURAL at 12:24

## 2022-06-29 RX ADMIN — ACETAMINOPHEN 1000 MG: 500 TABLET ORAL at 21:32

## 2022-06-29 RX ADMIN — FAMOTIDINE 20 MG: 20 TABLET ORAL at 18:16

## 2022-06-29 RX ADMIN — Medication 100 MCG: at 15:23

## 2022-06-29 RX ADMIN — BACLOFEN 10 MG: 10 TABLET ORAL at 21:32

## 2022-06-29 RX ADMIN — SODIUM CHLORIDE: 9 INJECTION, SOLUTION INTRAVENOUS at 13:10

## 2022-06-30 VITALS
HEART RATE: 74 BPM | DIASTOLIC BLOOD PRESSURE: 65 MMHG | RESPIRATION RATE: 16 BRPM | TEMPERATURE: 98.2 F | SYSTOLIC BLOOD PRESSURE: 122 MMHG | OXYGEN SATURATION: 95 %

## 2022-06-30 PROBLEM — Z96.659 S/P REVISION OF TOTAL KNEE: Status: ACTIVE | Noted: 2022-06-30

## 2022-06-30 PROBLEM — G89.18 ACUTE POSTOPERATIVE PAIN: Status: ACTIVE | Noted: 2022-06-30

## 2022-06-30 LAB
ANION GAP SERPL CALCULATED.3IONS-SCNC: 8 MMOL/L (ref 5–15)
BUN SERPL-MCNC: 17 MG/DL (ref 6–20)
BUN/CREAT SERPL: 11.8 (ref 7–25)
CALCIUM SPEC-SCNC: 8.6 MG/DL (ref 8.6–10.5)
CHLORIDE SERPL-SCNC: 110 MMOL/L (ref 98–107)
CO2 SERPL-SCNC: 26 MMOL/L (ref 22–29)
COTININE SERPL-MCNC: <1 NG/ML
CREAT SERPL-MCNC: 1.44 MG/DL (ref 0.57–1)
DEPRECATED RDW RBC AUTO: 47.6 FL (ref 37–54)
EGFRCR SERPLBLD CKD-EPI 2021: 42 ML/MIN/1.73
ERYTHROCYTE [DISTWIDTH] IN BLOOD BY AUTOMATED COUNT: 13.4 % (ref 12.3–15.4)
GLUCOSE SERPL-MCNC: 83 MG/DL (ref 65–99)
HCT VFR BLD AUTO: 26.6 % (ref 34–46.6)
HGB BLD-MCNC: 8.5 G/DL (ref 12–15.9)
INR PPP: 1.24 (ref 0.84–1.13)
MCH RBC QN AUTO: 31.4 PG (ref 26.6–33)
MCHC RBC AUTO-ENTMCNC: 32 G/DL (ref 31.5–35.7)
MCV RBC AUTO: 98.2 FL (ref 79–97)
NICOTINE SERPL-MCNC: <1 NG/ML
PLATELET # BLD AUTO: 170 10*3/MM3 (ref 140–450)
PMV BLD AUTO: 10.5 FL (ref 6–12)
POTASSIUM SERPL-SCNC: 4.3 MMOL/L (ref 3.5–5.2)
PROTHROMBIN TIME: 15.5 SECONDS (ref 11.4–14.4)
RBC # BLD AUTO: 2.71 10*6/MM3 (ref 3.77–5.28)
SODIUM SERPL-SCNC: 144 MMOL/L (ref 136–145)
WBC NRBC COR # BLD: 7.52 10*3/MM3 (ref 3.4–10.8)

## 2022-06-30 PROCEDURE — 97110 THERAPEUTIC EXERCISES: CPT

## 2022-06-30 PROCEDURE — 85027 COMPLETE CBC AUTOMATED: CPT | Performed by: NURSE PRACTITIONER

## 2022-06-30 PROCEDURE — 85610 PROTHROMBIN TIME: CPT | Performed by: INTERNAL MEDICINE

## 2022-06-30 PROCEDURE — 97161 PT EVAL LOW COMPLEX 20 MIN: CPT

## 2022-06-30 PROCEDURE — 25010000002 ROPIVACAINE PER 1 MG: Performed by: NURSE ANESTHETIST, CERTIFIED REGISTERED

## 2022-06-30 PROCEDURE — 80048 BASIC METABOLIC PNL TOTAL CA: CPT | Performed by: ORTHOPAEDIC SURGERY

## 2022-06-30 PROCEDURE — 25010000002 CEFAZOLIN IN DEXTROSE 2-4 GM/100ML-% SOLUTION: Performed by: ORTHOPAEDIC SURGERY

## 2022-06-30 RX ORDER — ROPIVACAINE HYDROCHLORIDE 2 MG/ML
1 INJECTION, SOLUTION EPIDURAL; INFILTRATION; PERINEURAL CONTINUOUS
Start: 2022-06-30 | End: 2022-09-01

## 2022-06-30 RX ORDER — OXYCODONE HYDROCHLORIDE 5 MG/1
5 TABLET ORAL EVERY 4 HOURS PRN
Refills: 0
Start: 2022-06-30 | End: 2022-09-01

## 2022-06-30 RX ORDER — ACETAMINOPHEN 500 MG
1000 TABLET ORAL EVERY 8 HOURS
Qty: 42 TABLET | Refills: 0
Start: 2022-06-30 | End: 2022-07-07

## 2022-06-30 RX ORDER — TRAMADOL HYDROCHLORIDE 50 MG/1
50 TABLET ORAL EVERY 6 HOURS PRN
Start: 2022-06-30 | End: 2022-09-01

## 2022-06-30 RX ORDER — MELOXICAM 15 MG/1
15 TABLET ORAL DAILY
Qty: 15 TABLET | Refills: 0
Start: 2022-06-30 | End: 2022-07-15

## 2022-06-30 RX ORDER — ROPIVACAINE HYDROCHLORIDE 2 MG/ML
INJECTION, SOLUTION EPIDURAL; INFILTRATION; PERINEURAL CONTINUOUS
Status: DISCONTINUED | OUTPATIENT
Start: 2022-06-30 | End: 2022-06-30 | Stop reason: HOSPADM

## 2022-06-30 RX ORDER — DOXYCYCLINE 100 MG/1
100 CAPSULE ORAL EVERY 12 HOURS SCHEDULED
Status: DISCONTINUED | OUTPATIENT
Start: 2022-06-30 | End: 2022-06-30 | Stop reason: HOSPADM

## 2022-06-30 RX ORDER — DOXYCYCLINE 100 MG/1
100 CAPSULE ORAL EVERY 12 HOURS SCHEDULED
Qty: 60 CAPSULE | Refills: 0 | Status: SHIPPED | OUTPATIENT
Start: 2022-06-30 | End: 2022-07-30

## 2022-06-30 RX ADMIN — LEVOTHYROXINE SODIUM 100 MCG: 0.1 TABLET ORAL at 05:52

## 2022-06-30 RX ADMIN — GABAPENTIN 300 MG: 300 CAPSULE ORAL at 07:51

## 2022-06-30 RX ADMIN — MELOXICAM 15 MG: 7.5 TABLET ORAL at 07:52

## 2022-06-30 RX ADMIN — FAMOTIDINE 20 MG: 20 TABLET ORAL at 05:52

## 2022-06-30 RX ADMIN — CEFAZOLIN SODIUM 2 G: 2 INJECTION, SOLUTION INTRAVENOUS at 05:52

## 2022-06-30 RX ADMIN — ACETAMINOPHEN 1000 MG: 500 TABLET ORAL at 11:54

## 2022-06-30 RX ADMIN — MIDODRINE HYDROCHLORIDE 5 MG: 5 TABLET ORAL at 05:52

## 2022-06-30 RX ADMIN — TRAMADOL HYDROCHLORIDE 50 MG: 50 TABLET, COATED ORAL at 00:14

## 2022-06-30 RX ADMIN — BACLOFEN 10 MG: 10 TABLET ORAL at 07:52

## 2022-06-30 RX ADMIN — SODIUM CHLORIDE, POTASSIUM CHLORIDE, SODIUM LACTATE AND CALCIUM CHLORIDE 100 ML/HR: 600; 310; 30; 20 INJECTION, SOLUTION INTRAVENOUS at 07:54

## 2022-06-30 RX ADMIN — MIDODRINE HYDROCHLORIDE 5 MG: 5 TABLET ORAL at 11:54

## 2022-06-30 RX ADMIN — ACETAMINOPHEN 1000 MG: 500 TABLET ORAL at 04:24

## 2022-06-30 RX ADMIN — ROPIVACAINE HYDROCHLORIDE 1000 MG: 2 INJECTION, SOLUTION EPIDURAL; INFILTRATION at 11:04

## 2022-06-30 RX ADMIN — DOXYCYCLINE 100 MG: 100 CAPSULE ORAL at 15:27

## 2022-06-30 RX ADMIN — DOCUSATE SODIUM 100 MG: 100 CAPSULE, LIQUID FILLED ORAL at 07:52

## 2022-06-30 RX ADMIN — GABAPENTIN 300 MG: 300 CAPSULE ORAL at 15:27

## 2022-06-30 RX ADMIN — BACLOFEN 10 MG: 10 TABLET ORAL at 15:27

## 2022-06-30 RX ADMIN — SERTRALINE 100 MG: 100 TABLET, FILM COATED ORAL at 07:51

## 2022-07-02 LAB
BACTERIA SPEC AEROBE CULT: NORMAL
GRAM STN SPEC: NORMAL

## 2022-07-09 LAB
BACTERIA SPEC ANAEROBE CULT: NORMAL

## 2022-08-10 LAB
FUNGUS WND CULT: NORMAL
MYCOBACTERIUM SPEC CULT: NORMAL
MYCOBACTERIUM SPEC CULT: NORMAL
NIGHT BLUE STAIN TISS: NORMAL
NIGHT BLUE STAIN TISS: NORMAL

## 2022-08-15 ENCOUNTER — LAB (OUTPATIENT)
Dept: LAB | Facility: HOSPITAL | Age: 57
End: 2022-08-15

## 2022-08-15 ENCOUNTER — TRANSCRIBE ORDERS (OUTPATIENT)
Dept: LAB | Facility: HOSPITAL | Age: 57
End: 2022-08-15

## 2022-08-15 DIAGNOSIS — T84.54XD INFECTION OF TOTAL LEFT KNEE REPLACEMENT, SUBSEQUENT ENCOUNTER: Primary | ICD-10-CM

## 2022-08-15 DIAGNOSIS — L03.116 CELLULITIS OF LEFT FOOT: ICD-10-CM

## 2022-08-15 DIAGNOSIS — E66.09 EXOGENOUS OBESITY: ICD-10-CM

## 2022-08-15 DIAGNOSIS — R73.03 DIABETES MELLITUS, LATENT: ICD-10-CM

## 2022-08-15 DIAGNOSIS — M00.062 STAPHYLOCOCCAL ARTHRITIS OF LEFT KNEE: ICD-10-CM

## 2022-08-15 DIAGNOSIS — T84.54XD INFECTION OF TOTAL LEFT KNEE REPLACEMENT, SUBSEQUENT ENCOUNTER: ICD-10-CM

## 2022-08-15 LAB
ALBUMIN SERPL-MCNC: 4 G/DL (ref 3.5–5.2)
ALBUMIN/GLOB SERPL: 1.2 G/DL
ALP SERPL-CCNC: 193 U/L (ref 39–117)
ALT SERPL W P-5'-P-CCNC: 26 U/L (ref 1–33)
ANION GAP SERPL CALCULATED.3IONS-SCNC: 14 MMOL/L (ref 5–15)
AST SERPL-CCNC: 42 U/L (ref 1–32)
BASOPHILS # BLD AUTO: 0.09 10*3/MM3 (ref 0–0.2)
BASOPHILS NFR BLD AUTO: 1 % (ref 0–1.5)
BILIRUB SERPL-MCNC: 0.6 MG/DL (ref 0–1.2)
BUN SERPL-MCNC: 25 MG/DL (ref 6–20)
BUN/CREAT SERPL: 15.8 (ref 7–25)
CALCIUM SPEC-SCNC: 9.9 MG/DL (ref 8.6–10.5)
CHLORIDE SERPL-SCNC: 97 MMOL/L (ref 98–107)
CO2 SERPL-SCNC: 28 MMOL/L (ref 22–29)
CREAT SERPL-MCNC: 1.58 MG/DL (ref 0.57–1)
CRP SERPL-MCNC: 1.54 MG/DL (ref 0–0.5)
DEPRECATED RDW RBC AUTO: 48.9 FL (ref 37–54)
EGFRCR SERPLBLD CKD-EPI 2021: 37.6 ML/MIN/1.73
EOSINOPHIL # BLD AUTO: 0.04 10*3/MM3 (ref 0–0.4)
EOSINOPHIL NFR BLD AUTO: 0.5 % (ref 0.3–6.2)
ERYTHROCYTE [DISTWIDTH] IN BLOOD BY AUTOMATED COUNT: 14.5 % (ref 12.3–15.4)
GLOBULIN UR ELPH-MCNC: 3.4 GM/DL
GLUCOSE SERPL-MCNC: 119 MG/DL (ref 65–99)
HCT VFR BLD AUTO: 37.9 % (ref 34–46.6)
HGB BLD-MCNC: 12.4 G/DL (ref 12–15.9)
IMM GRANULOCYTES # BLD AUTO: 0.04 10*3/MM3 (ref 0–0.05)
IMM GRANULOCYTES NFR BLD AUTO: 0.5 % (ref 0–0.5)
LYMPHOCYTES # BLD AUTO: 1.95 10*3/MM3 (ref 0.7–3.1)
LYMPHOCYTES NFR BLD AUTO: 22.7 % (ref 19.6–45.3)
MCH RBC QN AUTO: 30.2 PG (ref 26.6–33)
MCHC RBC AUTO-ENTMCNC: 32.7 G/DL (ref 31.5–35.7)
MCV RBC AUTO: 92.2 FL (ref 79–97)
MONOCYTES # BLD AUTO: 0.65 10*3/MM3 (ref 0.1–0.9)
MONOCYTES NFR BLD AUTO: 7.6 % (ref 5–12)
NEUTROPHILS NFR BLD AUTO: 5.82 10*3/MM3 (ref 1.7–7)
NEUTROPHILS NFR BLD AUTO: 67.7 % (ref 42.7–76)
NRBC BLD AUTO-RTO: 0 /100 WBC (ref 0–0.2)
PLATELET # BLD AUTO: 302 10*3/MM3 (ref 140–450)
PMV BLD AUTO: 10.4 FL (ref 6–12)
POTASSIUM SERPL-SCNC: 3.7 MMOL/L (ref 3.5–5.2)
PROT SERPL-MCNC: 7.4 G/DL (ref 6–8.5)
RBC # BLD AUTO: 4.11 10*6/MM3 (ref 3.77–5.28)
SODIUM SERPL-SCNC: 139 MMOL/L (ref 136–145)
WBC NRBC COR # BLD: 8.59 10*3/MM3 (ref 3.4–10.8)

## 2022-08-15 PROCEDURE — 80053 COMPREHEN METABOLIC PANEL: CPT

## 2022-08-15 PROCEDURE — 85025 COMPLETE CBC W/AUTO DIFF WBC: CPT

## 2022-08-15 PROCEDURE — 36415 COLL VENOUS BLD VENIPUNCTURE: CPT

## 2022-08-15 PROCEDURE — 86140 C-REACTIVE PROTEIN: CPT

## 2022-08-17 ENCOUNTER — TELEPHONE (OUTPATIENT)
Dept: CARDIOLOGY | Facility: CLINIC | Age: 57
End: 2022-08-17

## 2022-08-17 DIAGNOSIS — Z79.899 LONG-TERM USE OF HIGH-RISK MEDICATION: Primary | ICD-10-CM

## 2022-08-17 RX ORDER — FLUDROCORTISONE ACETATE 0.1 MG/1
0.1 TABLET ORAL DAILY
Qty: 30 TABLET | Refills: 1 | Status: SHIPPED | OUTPATIENT
Start: 2022-08-17 | End: 2022-10-17

## 2022-08-17 NOTE — TELEPHONE ENCOUNTER
Continue midodrine.  Start Florinef 0.1 mg p.o. daily.  Repeat BMP in 2 weeks.  Increase dietary sodium intake.  Avoid dehydration.  Knee-high compression stockings.

## 2022-08-17 NOTE — TELEPHONE ENCOUNTER
Overhead paged.For the past three days she feels like she is going to pass out every time she gets up. Taking Midodrine 5 mg three times a day. BP averaging @ 134/84. Hr @86. Please advise.

## 2022-08-24 RX ORDER — MIDODRINE HYDROCHLORIDE 10 MG/1
10 TABLET ORAL
Qty: 90 TABLET | Refills: 1 | Status: SHIPPED | OUTPATIENT
Start: 2022-08-24 | End: 2022-11-09

## 2022-08-24 NOTE — TELEPHONE ENCOUNTER
Overhead paged. States that she feels like she is going to pass out every time she stands up. Has dizziness ,things get dark and her ears ring. Fell one time using her walker.B/P has been ranging from 91/68 - 171/104. Today  B/p 93/73.  Taking her Midodrine 5 mg three times a day and fludrocortisone 0.1 mg every day. States that she is drinking plenty of fluids,and increasing her sodium intake. Is not using the knee-high compression stockings. Please advise.

## 2022-09-01 ENCOUNTER — OFFICE VISIT (OUTPATIENT)
Dept: CARDIOLOGY | Facility: CLINIC | Age: 57
End: 2022-09-01

## 2022-09-01 VITALS
SYSTOLIC BLOOD PRESSURE: 122 MMHG | OXYGEN SATURATION: 96 % | WEIGHT: 222 LBS | HEART RATE: 90 BPM | HEIGHT: 69 IN | DIASTOLIC BLOOD PRESSURE: 82 MMHG | BODY MASS INDEX: 32.88 KG/M2

## 2022-09-01 DIAGNOSIS — I95.1 AUTONOMIC ORTHOSTATIC HYPOTENSION: Primary | ICD-10-CM

## 2022-09-01 DIAGNOSIS — R55 SYNCOPE AND COLLAPSE: ICD-10-CM

## 2022-09-01 DIAGNOSIS — E78.2 MIXED HYPERLIPIDEMIA: ICD-10-CM

## 2022-09-01 PROCEDURE — 99213 OFFICE O/P EST LOW 20 MIN: CPT | Performed by: INTERNAL MEDICINE

## 2022-09-01 RX ORDER — CHOLECALCIFEROL (VITAMIN D3) 125 MCG
CAPSULE ORAL DAILY
COMMUNITY
Start: 2022-06-28

## 2022-09-01 NOTE — PROGRESS NOTES
[facility] Cardiology  Office visit  Corrina Reis  1965  [phone]  There is no work phone number on file.    VISIT DATE:  9/1/2022    [facility]: [facility] Merrill MD  [address] [facility] [place] [address]    CC:  Chief Complaint   Patient presents with   • Autonomic orthostatic hypotension           ASSESSMENT:   Diagnosis Plan   1. Autonomic orthostatic hypotension     2. Mixed hyperlipidemia         PLAN:  Recurrent orthostatic hypotension:  Continue conservative measures such as avoiding dehydration, liberalizing salt intake, elevating head of bed and as needed use of compression stockings.    Continue midodrine 10 mg p.o. 3 times daily and Florinef 0.1 mg p.o. daily.  Discontinue hydrochlorothiazide.  Repeat BMP in 2 weeks.      Subjective  Normal assessment: Worsening episodes of presyncope and syncope over the summer.  Recently increased midodrine to 10 mg p.o. 3 times daily about 1 week ago.  Recently initiated Florinef approximately 3 weeks ago.  Compliant with medical therapy.    Initial evaluation: 56-year-old female who is developed recurrent presyncope and syncope starting in March of this year.  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  Now using a wheeled walker for ambulation due to recurrent presyncope syncope.  Systolic blood pressures will intermittently dipped down into the 80 to 90 mmHg range.  No change in baseline weight.  No new medication prior to onset of her symptoms.  No illness prior to onset of symptoms.  She has been weaned off metoprolol.  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    PHYSICAL EXAMINATION:  Vitals:    09/01/22 0956   BP: 122/82   BP Location: Left arm   Patient Position: Sitting   Pulse: 90   SpO2: 96%   Weight: 101 kg (222 lb)   Height: 175.3 cm  "(69\")     General Appearance:    Alert, cooperative, no distress, appears stated age   Head:    Normocephalic, without obvious abnormality, atraumatic   Eyes:    conjunctiva/corneas clear   Nose:   Nares normal, septum midline, mucosa normal, no drainage   Throat:   Lips, teeth and gums normal   Neck:   Supple, symmetrical, trachea midline, no carotid    bruit or JVD   Lungs:     Clear to auscultation bilaterally, respirations unlabored   Chest Wall:    No tenderness or deformity    Heart:    Regular rate and rhythm, S1 and S2 normal, no murmur, rub   or gallop, normal carotid impulse bilaterally without bruit.   Abdomen:     Soft, non-tender   Extremities:   Extremities normal, atraumatic, no cyanosis or edema   Pulses:   2+ and symmetric all extremities   Skin:   Skin color, texture, turgor normal, no rashes or lesions       Diagnostic Data:  Procedures  No results found for: CHLPL, TRIG, HDL, LDLDIRECT  Lab Results   Component Value Date    GLUCOSE 119 (H) 08/15/2022    BUN 22 08/29/2022    CREATININE 1.9 (H) 08/29/2022     08/29/2022    K 3.3 (L) 08/29/2022    CL 97 (L) 08/29/2022    CO2 30 08/29/2022     Lab Results   Component Value Date    HGBA1C 5.10 06/27/2022     Lab Results   Component Value Date    WBC 8.59 08/15/2022    HGB 12.4 08/15/2022    HCT 37.9 08/15/2022     08/15/2022       Allergies  Allergies   Allergen Reactions   • Msg [Monosodium Glutamate] Nausea And Vomiting and Headache     Numbness around mouth and eyes   • Orange Nausea And Vomiting and Headache     Numbness around mouth and eyes   • Penicillins Hives   • Sulfa Antibiotics Hives       Current Medications    Current Outpatient Medications:   •  amitriptyline (ELAVIL) 50 MG tablet, Take 50 mg by mouth every night at bedtime., Disp: , Rfl:   •  atorvastatin (LIPITOR) 40 MG tablet, Take 1 tablet by mouth Every Night. Resume after finishing Daptomycin, Disp: , Rfl:   •  baclofen (LIORESAL) 10 MG tablet, Take 10 mg by mouth 3 " (Three) Times a Day., Disp: , Rfl:   •  Cholecalciferol (Vitamin D3) 50 MCG (2000 UT) tablet, Daily., Disp: , Rfl:   •  cyclobenzaprine (FLEXERIL) 5 MG tablet, Take 5 mg by mouth 3 (Three) Times a Day As Needed for Muscle Spasms., Disp: , Rfl:   •  diphenhydrAMINE (BENADRYL) 25 mg capsule, Take 25 mg by mouth Every 6 (Six) Hours As Needed for Allergies., Disp: , Rfl:   •  docusate sodium (Colace) 100 MG capsule, Take 1 capsule by mouth 2 (Two) Times a Day., Disp: , Rfl:   •  famotidine (PEPCID) 20 MG tablet, Take 20 mg by mouth 2 (two) times a day., Disp: , Rfl:   •  fludrocortisone 0.1 MG tablet, Take 1 tablet by mouth Daily., Disp: 30 tablet, Rfl: 1  •  gabapentin (NEURONTIN) 300 MG capsule, Take 300 mg by mouth 3 (Three) Times a Day., Disp: , Rfl:   •  hydroCHLOROthiazide (MICROZIDE) 12.5 MG capsule, Take 12.5 mg by mouth Daily., Disp: , Rfl:   •  levothyroxine (SYNTHROID, LEVOTHROID) 100 MCG tablet, Take 100 mcg by mouth Every Morning., Disp: , Rfl:   •  lidocaine (LIDODERM) 5 %, Place 1 patch on the skin as directed by provider Daily As Needed for Moderate Pain  (knee pain)., Disp: , Rfl:   •  midodrine (PROAMATINE) 10 MG tablet, Take 1 tablet by mouth 3 (Three) Times a Day Before Meals., Disp: 90 tablet, Rfl: 1  •  OnabotulinumtoxinA (Botox) 200 units reconstituted solution, Every 3 (Three) Months., Disp: , Rfl:   •  ondansetron (Zofran) 4 MG tablet, Take 1 tablet by mouth Every 8 (Eight) Hours As Needed for Nausea or Vomiting., Disp: , Rfl:   •  sertraline (ZOLOFT) 100 MG tablet, Take 100 mg by mouth Daily., Disp: , Rfl:   •  warfarin (COUMADIN) 3 MG tablet, Take 3 mg by mouth Every Night. Stopped per PCP prescribing MD instructions for surgery last dose 6-24-22 per patient report, Disp: , Rfl:           ROS  ROS      SOCIAL HX  Social History     Socioeconomic History   • Marital status:    Tobacco Use   • Smoking status: Never Smoker   • Smokeless tobacco: Never Used   Vaping Use   • Vaping Use:  Never used   Substance and Sexual Activity   • Alcohol use: Never   • Drug use: Never   • Sexual activity: Not Currently     Partners: Male     Birth control/protection: Post-menopausal       FAMILY HX  Family History   Problem Relation Age of Onset   • Heart disease Mother    • Atrial fibrillation Mother    • Diabetes Mother    • Arrhythmia Mother    • Hyperlipidemia Mother    • No Known Problems Father    • Arrhythmia Sister    • Hypertension Sister    • Autoimmune disease Sister    • Diabetes Sister    • Graves' disease Sister    • Lung cancer Brother    • Rectal cancer Brother    • Diabetes Brother    • Hypertension Brother    • Graves' disease Brother    • Hypertension Brother    • Heart attack Brother    • Heart attack Brother    • Hyperlipidemia Brother    • Hypertension Brother    • Hyperlipidemia Sister    • Hypertension Sister    • Hyperlipidemia Sister    • Hypertension Sister    • Hyperlipidemia Brother    • Hypertension Brother    • Hyperlipidemia Sister    • Hypertension Sister    • Hypertension Sister    • Hypertension Brother              Raman Meza III, MD, FACC

## 2022-09-12 ENCOUNTER — LAB (OUTPATIENT)
Dept: LAB | Facility: HOSPITAL | Age: 57
End: 2022-09-12

## 2022-09-12 ENCOUNTER — TRANSCRIBE ORDERS (OUTPATIENT)
Dept: LAB | Facility: HOSPITAL | Age: 57
End: 2022-09-12

## 2022-09-12 DIAGNOSIS — B36.9 CHRONIC MYCOTIC OTITIS EXTERNA: ICD-10-CM

## 2022-09-12 DIAGNOSIS — H62.40 CHRONIC MYCOTIC OTITIS EXTERNA: Primary | ICD-10-CM

## 2022-09-12 DIAGNOSIS — T84.54XD INFECTION OF TOTAL LEFT KNEE REPLACEMENT, SUBSEQUENT ENCOUNTER: ICD-10-CM

## 2022-09-12 DIAGNOSIS — H62.40 CHRONIC MYCOTIC OTITIS EXTERNA: ICD-10-CM

## 2022-09-12 DIAGNOSIS — L03.116 CELLULITIS OF LEFT FOOT: ICD-10-CM

## 2022-09-12 DIAGNOSIS — B36.9 CHRONIC MYCOTIC OTITIS EXTERNA: Primary | ICD-10-CM

## 2022-09-12 LAB
ALBUMIN SERPL-MCNC: 3.1 G/DL (ref 3.5–5.2)
ALBUMIN/GLOB SERPL: 0.9 G/DL
ALP SERPL-CCNC: 133 U/L (ref 39–117)
ALT SERPL W P-5'-P-CCNC: 41 U/L (ref 1–33)
ANION GAP SERPL CALCULATED.3IONS-SCNC: 9 MMOL/L (ref 5–15)
AST SERPL-CCNC: 42 U/L (ref 1–32)
BASOPHILS # BLD AUTO: 0.06 10*3/MM3 (ref 0–0.2)
BASOPHILS NFR BLD AUTO: 1 % (ref 0–1.5)
BILIRUB SERPL-MCNC: 0.4 MG/DL (ref 0–1.2)
BUN SERPL-MCNC: 13 MG/DL (ref 6–20)
BUN/CREAT SERPL: 10.7 (ref 7–25)
CALCIUM SPEC-SCNC: 8.9 MG/DL (ref 8.6–10.5)
CHLORIDE SERPL-SCNC: 103 MMOL/L (ref 98–107)
CO2 SERPL-SCNC: 25 MMOL/L (ref 22–29)
CREAT SERPL-MCNC: 1.21 MG/DL (ref 0.57–1)
CRP SERPL-MCNC: 1.53 MG/DL (ref 0–0.5)
DEPRECATED RDW RBC AUTO: 54.5 FL (ref 37–54)
EGFRCR SERPLBLD CKD-EPI 2021: 51.7 ML/MIN/1.73
EOSINOPHIL # BLD AUTO: 0.17 10*3/MM3 (ref 0–0.4)
EOSINOPHIL NFR BLD AUTO: 2.8 % (ref 0.3–6.2)
ERYTHROCYTE [DISTWIDTH] IN BLOOD BY AUTOMATED COUNT: 16.1 % (ref 12.3–15.4)
GLOBULIN UR ELPH-MCNC: 3.3 GM/DL
GLUCOSE SERPL-MCNC: 90 MG/DL (ref 65–99)
HCT VFR BLD AUTO: 34.3 % (ref 34–46.6)
HGB BLD-MCNC: 11 G/DL (ref 12–15.9)
IMM GRANULOCYTES # BLD AUTO: 0.01 10*3/MM3 (ref 0–0.05)
IMM GRANULOCYTES NFR BLD AUTO: 0.2 % (ref 0–0.5)
LYMPHOCYTES # BLD AUTO: 1.76 10*3/MM3 (ref 0.7–3.1)
LYMPHOCYTES NFR BLD AUTO: 28.8 % (ref 19.6–45.3)
MCH RBC QN AUTO: 30.3 PG (ref 26.6–33)
MCHC RBC AUTO-ENTMCNC: 32.1 G/DL (ref 31.5–35.7)
MCV RBC AUTO: 94.5 FL (ref 79–97)
MONOCYTES # BLD AUTO: 0.57 10*3/MM3 (ref 0.1–0.9)
MONOCYTES NFR BLD AUTO: 9.3 % (ref 5–12)
NEUTROPHILS NFR BLD AUTO: 3.54 10*3/MM3 (ref 1.7–7)
NEUTROPHILS NFR BLD AUTO: 57.9 % (ref 42.7–76)
NRBC BLD AUTO-RTO: 0 /100 WBC (ref 0–0.2)
PLATELET # BLD AUTO: 271 10*3/MM3 (ref 140–450)
PMV BLD AUTO: 9.9 FL (ref 6–12)
POTASSIUM SERPL-SCNC: 4.2 MMOL/L (ref 3.5–5.2)
PROT SERPL-MCNC: 6.4 G/DL (ref 6–8.5)
RBC # BLD AUTO: 3.63 10*6/MM3 (ref 3.77–5.28)
SODIUM SERPL-SCNC: 137 MMOL/L (ref 136–145)
WBC NRBC COR # BLD: 6.11 10*3/MM3 (ref 3.4–10.8)

## 2022-09-12 PROCEDURE — 86140 C-REACTIVE PROTEIN: CPT

## 2022-09-12 PROCEDURE — 80053 COMPREHEN METABOLIC PANEL: CPT

## 2022-09-12 PROCEDURE — 36415 COLL VENOUS BLD VENIPUNCTURE: CPT

## 2022-09-12 PROCEDURE — 85025 COMPLETE CBC W/AUTO DIFF WBC: CPT

## 2022-10-12 ENCOUNTER — LAB (OUTPATIENT)
Dept: LAB | Facility: HOSPITAL | Age: 57
End: 2022-10-12

## 2022-10-12 ENCOUNTER — TRANSCRIBE ORDERS (OUTPATIENT)
Dept: LAB | Facility: HOSPITAL | Age: 57
End: 2022-10-12

## 2022-10-12 DIAGNOSIS — T84.54XD INFECTION OF TOTAL LEFT KNEE REPLACEMENT, SUBSEQUENT ENCOUNTER: ICD-10-CM

## 2022-10-12 DIAGNOSIS — L03.116 CELLULITIS OF LEFT FOOT: ICD-10-CM

## 2022-10-12 DIAGNOSIS — B36.9 FUNGAL DERMATITIS: Primary | ICD-10-CM

## 2022-10-12 LAB
ALBUMIN SERPL-MCNC: 4.2 G/DL (ref 3.5–5.2)
ALBUMIN/GLOB SERPL: 1.3 G/DL
ALP SERPL-CCNC: 81 U/L (ref 39–117)
ALT SERPL W P-5'-P-CCNC: 25 U/L (ref 1–33)
ANION GAP SERPL CALCULATED.3IONS-SCNC: 11 MMOL/L (ref 5–15)
AST SERPL-CCNC: 27 U/L (ref 1–32)
BASOPHILS # BLD AUTO: 0.08 10*3/MM3 (ref 0–0.2)
BASOPHILS NFR BLD AUTO: 1.2 % (ref 0–1.5)
BILIRUB SERPL-MCNC: 0.5 MG/DL (ref 0–1.2)
BUN SERPL-MCNC: 11 MG/DL (ref 6–20)
BUN/CREAT SERPL: 9.6 (ref 7–25)
CALCIUM SPEC-SCNC: 9.9 MG/DL (ref 8.6–10.5)
CHLORIDE SERPL-SCNC: 104 MMOL/L (ref 98–107)
CO2 SERPL-SCNC: 25 MMOL/L (ref 22–29)
CREAT SERPL-MCNC: 1.14 MG/DL (ref 0.57–1)
CRP SERPL-MCNC: 1.41 MG/DL (ref 0–0.5)
DEPRECATED RDW RBC AUTO: 50 FL (ref 37–54)
EGFRCR SERPLBLD CKD-EPI 2021: 55.6 ML/MIN/1.73
EOSINOPHIL # BLD AUTO: 0.08 10*3/MM3 (ref 0–0.4)
EOSINOPHIL NFR BLD AUTO: 1.2 % (ref 0.3–6.2)
ERYTHROCYTE [DISTWIDTH] IN BLOOD BY AUTOMATED COUNT: 14.5 % (ref 12.3–15.4)
ERYTHROCYTE [SEDIMENTATION RATE] IN BLOOD: 29 MM/HR (ref 0–30)
GLOBULIN UR ELPH-MCNC: 3.3 GM/DL
GLUCOSE SERPL-MCNC: 101 MG/DL (ref 65–99)
HCT VFR BLD AUTO: 37.8 % (ref 34–46.6)
HGB BLD-MCNC: 12.3 G/DL (ref 12–15.9)
IMM GRANULOCYTES # BLD AUTO: 0.02 10*3/MM3 (ref 0–0.05)
IMM GRANULOCYTES NFR BLD AUTO: 0.3 % (ref 0–0.5)
LYMPHOCYTES # BLD AUTO: 2.02 10*3/MM3 (ref 0.7–3.1)
LYMPHOCYTES NFR BLD AUTO: 30.2 % (ref 19.6–45.3)
MCH RBC QN AUTO: 30.5 PG (ref 26.6–33)
MCHC RBC AUTO-ENTMCNC: 32.5 G/DL (ref 31.5–35.7)
MCV RBC AUTO: 93.8 FL (ref 79–97)
MONOCYTES # BLD AUTO: 0.57 10*3/MM3 (ref 0.1–0.9)
MONOCYTES NFR BLD AUTO: 8.5 % (ref 5–12)
NEUTROPHILS NFR BLD AUTO: 3.92 10*3/MM3 (ref 1.7–7)
NEUTROPHILS NFR BLD AUTO: 58.6 % (ref 42.7–76)
NRBC BLD AUTO-RTO: 0 /100 WBC (ref 0–0.2)
PLATELET # BLD AUTO: 259 10*3/MM3 (ref 140–450)
PMV BLD AUTO: 10.8 FL (ref 6–12)
POTASSIUM SERPL-SCNC: 4.3 MMOL/L (ref 3.5–5.2)
PROT SERPL-MCNC: 7.5 G/DL (ref 6–8.5)
RBC # BLD AUTO: 4.03 10*6/MM3 (ref 3.77–5.28)
SODIUM SERPL-SCNC: 140 MMOL/L (ref 136–145)
WBC NRBC COR # BLD: 6.69 10*3/MM3 (ref 3.4–10.8)

## 2022-10-12 PROCEDURE — 86140 C-REACTIVE PROTEIN: CPT | Performed by: INTERNAL MEDICINE

## 2022-10-12 PROCEDURE — 85652 RBC SED RATE AUTOMATED: CPT | Performed by: INTERNAL MEDICINE

## 2022-10-12 PROCEDURE — 36415 COLL VENOUS BLD VENIPUNCTURE: CPT | Performed by: INTERNAL MEDICINE

## 2022-10-12 PROCEDURE — 85025 COMPLETE CBC W/AUTO DIFF WBC: CPT | Performed by: INTERNAL MEDICINE

## 2022-10-12 PROCEDURE — 80053 COMPREHEN METABOLIC PANEL: CPT | Performed by: INTERNAL MEDICINE

## 2022-10-17 RX ORDER — FLUDROCORTISONE ACETATE 0.1 MG/1
TABLET ORAL
Qty: 30 TABLET | Refills: 2 | Status: SHIPPED | OUTPATIENT
Start: 2022-10-17 | End: 2023-01-23

## 2022-11-09 RX ORDER — MIDODRINE HYDROCHLORIDE 10 MG/1
TABLET ORAL
Qty: 90 TABLET | Refills: 3 | Status: SHIPPED | OUTPATIENT
Start: 2022-11-09 | End: 2023-03-10

## 2022-11-14 ENCOUNTER — TRANSCRIBE ORDERS (OUTPATIENT)
Dept: LAB | Facility: HOSPITAL | Age: 57
End: 2022-11-14

## 2022-11-14 ENCOUNTER — LAB (OUTPATIENT)
Dept: LAB | Facility: HOSPITAL | Age: 57
End: 2022-11-14

## 2022-11-14 DIAGNOSIS — M00.062 STAPHYLOCOCCAL ARTHRITIS OF LEFT KNEE: ICD-10-CM

## 2022-11-14 DIAGNOSIS — T84.54XD INFECTION OF TOTAL LEFT KNEE REPLACEMENT, SUBSEQUENT ENCOUNTER: ICD-10-CM

## 2022-11-14 DIAGNOSIS — L03.116 CELLULITIS OF LEFT FOOT: ICD-10-CM

## 2022-11-14 DIAGNOSIS — B95.62 CELLULITIS DUE TO MRSA: ICD-10-CM

## 2022-11-14 DIAGNOSIS — B36.9 CHRONIC MYCOTIC OTITIS EXTERNA: ICD-10-CM

## 2022-11-14 DIAGNOSIS — L03.90 CELLULITIS DUE TO MRSA: ICD-10-CM

## 2022-11-14 DIAGNOSIS — H62.40 CHRONIC MYCOTIC OTITIS EXTERNA: ICD-10-CM

## 2022-11-14 DIAGNOSIS — H62.40 CHRONIC MYCOTIC OTITIS EXTERNA: Primary | ICD-10-CM

## 2022-11-14 DIAGNOSIS — B36.9 CHRONIC MYCOTIC OTITIS EXTERNA: Primary | ICD-10-CM

## 2022-11-14 LAB
ALBUMIN SERPL-MCNC: 4.7 G/DL (ref 3.5–5.2)
ALBUMIN/GLOB SERPL: 1.5 G/DL
ALP SERPL-CCNC: 102 U/L (ref 39–117)
ALT SERPL W P-5'-P-CCNC: 26 U/L (ref 1–33)
ANION GAP SERPL CALCULATED.3IONS-SCNC: 12 MMOL/L (ref 5–15)
AST SERPL-CCNC: 31 U/L (ref 1–32)
BASOPHILS # BLD AUTO: 0.07 10*3/MM3 (ref 0–0.2)
BASOPHILS NFR BLD AUTO: 1.1 % (ref 0–1.5)
BILIRUB SERPL-MCNC: 0.7 MG/DL (ref 0–1.2)
BUN SERPL-MCNC: 18 MG/DL (ref 6–20)
BUN/CREAT SERPL: 12.8 (ref 7–25)
CALCIUM SPEC-SCNC: 10 MG/DL (ref 8.6–10.5)
CHLORIDE SERPL-SCNC: 103 MMOL/L (ref 98–107)
CO2 SERPL-SCNC: 25 MMOL/L (ref 22–29)
CREAT SERPL-MCNC: 1.41 MG/DL (ref 0.57–1)
CRP SERPL-MCNC: 0.93 MG/DL (ref 0–0.5)
DEPRECATED RDW RBC AUTO: 47.8 FL (ref 37–54)
EGFRCR SERPLBLD CKD-EPI 2021: 43.1 ML/MIN/1.73
EOSINOPHIL # BLD AUTO: 0.13 10*3/MM3 (ref 0–0.4)
EOSINOPHIL NFR BLD AUTO: 2 % (ref 0.3–6.2)
ERYTHROCYTE [DISTWIDTH] IN BLOOD BY AUTOMATED COUNT: 13.9 % (ref 12.3–15.4)
GLOBULIN UR ELPH-MCNC: 3.1 GM/DL
GLUCOSE SERPL-MCNC: 113 MG/DL (ref 65–99)
HCT VFR BLD AUTO: 40.5 % (ref 34–46.6)
HGB BLD-MCNC: 13.8 G/DL (ref 12–15.9)
IMM GRANULOCYTES # BLD AUTO: 0.03 10*3/MM3 (ref 0–0.05)
IMM GRANULOCYTES NFR BLD AUTO: 0.5 % (ref 0–0.5)
LYMPHOCYTES # BLD AUTO: 2.21 10*3/MM3 (ref 0.7–3.1)
LYMPHOCYTES NFR BLD AUTO: 33.2 % (ref 19.6–45.3)
MCH RBC QN AUTO: 31.6 PG (ref 26.6–33)
MCHC RBC AUTO-ENTMCNC: 34.1 G/DL (ref 31.5–35.7)
MCV RBC AUTO: 92.7 FL (ref 79–97)
MONOCYTES # BLD AUTO: 0.52 10*3/MM3 (ref 0.1–0.9)
MONOCYTES NFR BLD AUTO: 7.8 % (ref 5–12)
NEUTROPHILS NFR BLD AUTO: 3.7 10*3/MM3 (ref 1.7–7)
NEUTROPHILS NFR BLD AUTO: 55.4 % (ref 42.7–76)
NRBC BLD AUTO-RTO: 0 /100 WBC (ref 0–0.2)
PLATELET # BLD AUTO: 228 10*3/MM3 (ref 140–450)
PMV BLD AUTO: 11 FL (ref 6–12)
POTASSIUM SERPL-SCNC: 4.4 MMOL/L (ref 3.5–5.2)
PROT SERPL-MCNC: 7.8 G/DL (ref 6–8.5)
RBC # BLD AUTO: 4.37 10*6/MM3 (ref 3.77–5.28)
SODIUM SERPL-SCNC: 140 MMOL/L (ref 136–145)
WBC NRBC COR # BLD: 6.66 10*3/MM3 (ref 3.4–10.8)

## 2022-11-14 PROCEDURE — 85025 COMPLETE CBC W/AUTO DIFF WBC: CPT

## 2022-11-14 PROCEDURE — 36415 COLL VENOUS BLD VENIPUNCTURE: CPT

## 2022-11-14 PROCEDURE — 80053 COMPREHEN METABOLIC PANEL: CPT

## 2022-11-14 PROCEDURE — 86140 C-REACTIVE PROTEIN: CPT

## 2022-12-28 ENCOUNTER — TELEPHONE (OUTPATIENT)
Dept: CARDIOLOGY | Facility: CLINIC | Age: 57
End: 2022-12-28

## 2022-12-28 NOTE — TELEPHONE ENCOUNTER
----- Message from Raman Meza III, MD sent at 12/28/2022  9:44 AM EST -----  Normal heart monitor.

## 2023-01-23 RX ORDER — FLUDROCORTISONE ACETATE 0.1 MG/1
TABLET ORAL
Qty: 30 TABLET | Refills: 2 | Status: SHIPPED | OUTPATIENT
Start: 2023-01-23 | End: 2023-03-13 | Stop reason: SDUPTHER

## 2023-03-10 RX ORDER — MIDODRINE HYDROCHLORIDE 10 MG/1
TABLET ORAL
Qty: 90 TABLET | Refills: 1 | Status: SHIPPED | OUTPATIENT
Start: 2023-03-10

## 2023-03-13 ENCOUNTER — OFFICE VISIT (OUTPATIENT)
Dept: CARDIOLOGY | Facility: CLINIC | Age: 58
End: 2023-03-13
Payer: COMMERCIAL

## 2023-03-13 VITALS
SYSTOLIC BLOOD PRESSURE: 138 MMHG | DIASTOLIC BLOOD PRESSURE: 74 MMHG | WEIGHT: 224 LBS | HEART RATE: 102 BPM | HEIGHT: 69 IN | OXYGEN SATURATION: 100 % | BODY MASS INDEX: 33.18 KG/M2

## 2023-03-13 DIAGNOSIS — I95.1 AUTONOMIC ORTHOSTATIC HYPOTENSION: Primary | ICD-10-CM

## 2023-03-13 PROCEDURE — 99213 OFFICE O/P EST LOW 20 MIN: CPT | Performed by: INTERNAL MEDICINE

## 2023-03-13 RX ORDER — FLUDROCORTISONE ACETATE 0.1 MG/1
0.1 TABLET ORAL DAILY
Qty: 90 TABLET | Refills: 1 | Status: SHIPPED | OUTPATIENT
Start: 2023-03-13

## 2023-03-13 NOTE — PROGRESS NOTES
"[facility] Cardiology  Office visit  Corrina Reis  1965  [phone]  There is no work phone number on file.    VISIT DATE:  3/13/2023    PCP: [facility]ill [facility]  [address] [place] [address]    CC:  Chief Complaint   Patient presents with   • autonomic orthostatic hypotension            ASSESSMENT:   Diagnosis Plan   1. Autonomic orthostatic hypotension            PLAN:  Recurrent orthostatic hypotension:  Continue conservative measures such as avoiding dehydration, liberalizing salt intake, elevating head of bed and as needed use of compression stockings.    Continue midodrine 10 mg p.o. 3 times daily and Florinef 0.1 mg p.o. daily.        Subjective  Interval assessment: Symptoms of presyncope are fairly well controlled.  Using wheeled walker for ambulation.  Compliant with medical therapy.    Initial evaluation: 56-year-old female who is developed recurrent presyncope and syncope starting in March of this year.  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  Now using a wheeled walker for ambulation due to recurrent presyncope syncope.  Systolic blood pressures will intermittently dipped down into the 80 to 90 mmHg range.  No change in baseline weight.  No new medication prior to onset of her symptoms.  No illness prior to onset of symptoms.  She has been weaned off metoprolol.  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    PHYSICAL EXAMINATION:  Vitals:    03/13/23 1136   BP: 138/74   BP Location: Left arm   Patient Position: Sitting   Pulse: 102   SpO2: 100%   Weight: 102 kg (224 lb)   Height: 175.3 cm (69\")     General Appearance:    Alert, cooperative, no distress, appears stated age   Head:    Normocephalic, without obvious abnormality, atraumatic   Eyes:    conjunctiva/corneas " clear   Nose:   Nares normal, septum midline, mucosa normal, no drainage   Throat:   Lips, teeth and gums normal   Neck:   Supple, symmetrical, trachea midline, no carotid    bruit or JVD   Lungs:     Clear to auscultation bilaterally, respirations unlabored   Chest Wall:    No tenderness or deformity    Heart:    Regular rate and rhythm, S1 and S2 normal, no murmur, rub   or gallop, normal carotid impulse bilaterally without bruit.   Abdomen:     Soft, non-tender   Extremities:   Extremities normal, atraumatic, no cyanosis.  Trivial to 1+ bilateral ankle and pretibial edema.   Pulses:   2+ and symmetric all extremities   Skin:   Skin color, texture, turgor normal, no rashes or lesions       Diagnostic Data:  Procedures  No results found for: CHLPL, TRIG, HDL, LDLDIRECT  Lab Results   Component Value Date    GLUCOSE 113 (H) 11/14/2022    BUN 21 12/28/2022    CREATININE 1.3 (H) 12/28/2022     12/28/2022    K 4.6 12/28/2022     12/28/2022    CO2 26 12/28/2022     Lab Results   Component Value Date    HGBA1C 5.10 06/27/2022     Lab Results   Component Value Date    WBC 6.66 11/14/2022    HGB 13.8 11/14/2022    HCT 40.5 11/14/2022     11/14/2022       Allergies  Allergies   Allergen Reactions   • Msg [Monosodium Glutamate] Nausea And Vomiting and Headache     Numbness around mouth and eyes   • Orange Nausea And Vomiting and Headache     Numbness around mouth and eyes   • Penicillins Hives   • Sulfa Antibiotics Hives       Current Medications    Current Outpatient Medications:   •  amitriptyline (ELAVIL) 50 MG tablet, Take 1 tablet by mouth every night at bedtime., Disp: , Rfl:   •  atorvastatin (LIPITOR) 40 MG tablet, Take 1 tablet by mouth Every Night. Resume after finishing Daptomycin, Disp: , Rfl:   •  baclofen (LIORESAL) 10 MG tablet, Take 1 tablet by mouth 3 (Three) Times a Day., Disp: , Rfl:   •  Cholecalciferol (Vitamin D3) 50 MCG (2000 UT) tablet, Daily., Disp: , Rfl:   •  cyclobenzaprine  (FLEXERIL) 5 MG tablet, Take 1 tablet by mouth 3 (Three) Times a Day As Needed for Muscle Spasms., Disp: , Rfl:   •  docusate sodium (Colace) 100 MG capsule, Take 1 capsule by mouth 2 (Two) Times a Day., Disp: , Rfl:   •  famotidine (PEPCID) 20 MG tablet, Take 1 tablet by mouth 2 (two) times a day., Disp: , Rfl:   •  fludrocortisone 0.1 MG tablet, TAKE ONE TABLET BY MOUTH EVERY DAY, Disp: 30 tablet, Rfl: 2  •  gabapentin (NEURONTIN) 300 MG capsule, Take 1 capsule by mouth 3 (Three) Times a Day., Disp: , Rfl:   •  levothyroxine (SYNTHROID, LEVOTHROID) 100 MCG tablet, Take 1 tablet by mouth Every Morning., Disp: , Rfl:   •  lidocaine (LIDODERM) 5 %, Place 1 patch on the skin as directed by provider Daily As Needed for Moderate Pain (knee pain)., Disp: , Rfl:   •  midodrine (PROAMATINE) 10 MG tablet, TAKE ONE TABLET BY MOUTH THREE TIMES A DAY BEFORE MEALS, Disp: 90 tablet, Rfl: 1  •  OnabotulinumtoxinA (Botox) 200 units reconstituted solution, Every 3 (Three) Months., Disp: , Rfl:   •  sertraline (ZOLOFT) 100 MG tablet, Take 1 tablet by mouth Daily., Disp: , Rfl:   •  warfarin (COUMADIN) 3 MG tablet, Take 1 tablet by mouth Every Night. Stopped per PCP prescribing MD instructions for surgery last dose 6-24-22 per patient report, Disp: , Rfl:           ROS  ROS      SOCIAL HX  Social History     Socioeconomic History   • Marital status:    Tobacco Use   • Smoking status: Never   • Smokeless tobacco: Never   Vaping Use   • Vaping Use: Never used   Substance and Sexual Activity   • Alcohol use: Never   • Drug use: Never   • Sexual activity: Not Currently     Partners: Male     Birth control/protection: Post-menopausal       FAMILY HX  Family History   Problem Relation Age of Onset   • Heart disease Mother    • Atrial fibrillation Mother    • Diabetes Mother    • Arrhythmia Mother    • Hyperlipidemia Mother    • Heart failure Mother    • No Known Problems Father    • Arrhythmia Sister    • Hypertension Sister    •  Autoimmune disease Sister    • Diabetes Sister    • Graves' disease Sister    • Lung cancer Brother    • Rectal cancer Brother    • Diabetes Brother    • Hypertension Brother    • Graves' disease Brother    • Hyperlipidemia Brother    • Hypertension Brother    • Heart attack Brother            • Heart attack Brother    • Hyperlipidemia Brother    • Hypertension Brother    • Hyperlipidemia Sister    • Hypertension Sister    • Hyperlipidemia Sister    • Hypertension Sister    • Arrhythmia Sister    • Hyperlipidemia Brother    • Hypertension Brother    • Hyperlipidemia Sister    • Hypertension Sister    • Hypertension Sister    • Hypertension Brother              Raman Meza III, MD, [facility]

## 2023-10-09 ENCOUNTER — TELEPHONE (OUTPATIENT)
Dept: CARDIOLOGY | Facility: CLINIC | Age: 58
End: 2023-10-09
Payer: COMMERCIAL

## 2023-10-09 DIAGNOSIS — Z79.899 LONG-TERM USE OF HIGH-RISK MEDICATION: Primary | ICD-10-CM

## 2023-10-09 NOTE — TELEPHONE ENCOUNTER
Passed out last night and hit the floor. Did not hit her head. Passed put again this morning.Currently taking Midodrine 10 mg three times a day and Fludrocortisone 0.1 mg every day. Want to know if her medication needs to be adjusted? Please advise.

## 2023-10-10 RX ORDER — PYRIDOSTIGMINE BROMIDE 60 MG/1
30 TABLET ORAL 2 TIMES DAILY
Qty: 30 TABLET | Refills: 3 | Status: SHIPPED | OUTPATIENT
Start: 2023-10-10 | End: 2023-10-10 | Stop reason: SDUPTHER

## 2023-10-10 RX ORDER — FLUDROCORTISONE ACETATE 0.1 MG/1
0.2 TABLET ORAL DAILY
Qty: 60 TABLET | Refills: 3 | Status: SHIPPED | OUTPATIENT
Start: 2023-10-10

## 2023-10-10 RX ORDER — PYRIDOSTIGMINE BROMIDE 60 MG/1
30 TABLET ORAL 2 TIMES DAILY
Qty: 30 TABLET | Refills: 3 | Status: SHIPPED | OUTPATIENT
Start: 2023-10-10

## 2023-10-10 RX ORDER — FLUDROCORTISONE ACETATE 0.1 MG/1
0.2 TABLET ORAL DAILY
Qty: 60 TABLET | Refills: 3 | Status: SHIPPED | OUTPATIENT
Start: 2023-10-10 | End: 2023-10-10 | Stop reason: SDUPTHER

## 2023-11-13 ENCOUNTER — OFFICE VISIT (OUTPATIENT)
Dept: CARDIOLOGY | Facility: CLINIC | Age: 58
End: 2023-11-13
Payer: COMMERCIAL

## 2023-11-13 VITALS
HEART RATE: 96 BPM | WEIGHT: 220.4 LBS | BODY MASS INDEX: 32.64 KG/M2 | SYSTOLIC BLOOD PRESSURE: 120 MMHG | DIASTOLIC BLOOD PRESSURE: 76 MMHG | HEIGHT: 69 IN | OXYGEN SATURATION: 97 %

## 2023-11-13 DIAGNOSIS — I95.1 AUTONOMIC ORTHOSTATIC HYPOTENSION: Primary | ICD-10-CM

## 2023-11-13 DIAGNOSIS — E78.2 MIXED HYPERLIPIDEMIA: ICD-10-CM

## 2023-11-13 PROCEDURE — 99213 OFFICE O/P EST LOW 20 MIN: CPT | Performed by: INTERNAL MEDICINE

## 2023-11-13 RX ORDER — METHOCARBAMOL 750 MG/1
750 TABLET, FILM COATED ORAL 4 TIMES DAILY
COMMUNITY
Start: 2023-05-23

## 2023-11-13 RX ORDER — APIXABAN 5 MG/1
5 TABLET, FILM COATED ORAL EVERY 12 HOURS SCHEDULED
COMMUNITY
Start: 2023-06-21

## 2023-11-13 RX ORDER — FLUOXETINE HYDROCHLORIDE 20 MG/1
20 CAPSULE ORAL DAILY
COMMUNITY
Start: 2023-09-16

## 2023-11-13 RX ORDER — ERGOCALCIFEROL 1.25 MG/1
50000 CAPSULE ORAL
COMMUNITY
Start: 2023-09-04

## 2023-11-13 RX ORDER — POTASSIUM CHLORIDE 1500 MG/1
20 TABLET, EXTENDED RELEASE ORAL AS NEEDED
COMMUNITY
Start: 2023-10-30

## 2023-11-13 RX ORDER — BUMETANIDE 1 MG/1
1 TABLET ORAL 2 TIMES DAILY
COMMUNITY
Start: 2023-11-04

## 2023-11-13 RX ORDER — DULOXETIN HYDROCHLORIDE 60 MG/1
60 CAPSULE, DELAYED RELEASE ORAL DAILY
COMMUNITY
Start: 2023-10-30

## 2023-11-13 RX ORDER — HYDROXYZINE 50 MG/1
50 TABLET, FILM COATED ORAL DAILY
COMMUNITY
Start: 2023-11-08

## 2023-11-13 RX ORDER — MELOXICAM 15 MG/1
15 TABLET ORAL DAILY
COMMUNITY
Start: 2023-09-26

## 2023-11-13 RX ORDER — LINACLOTIDE 145 UG/1
145 CAPSULE, GELATIN COATED ORAL
COMMUNITY
Start: 2023-11-08

## 2023-11-13 RX ORDER — BUPROPION HYDROCHLORIDE 100 MG/1
100 TABLET ORAL 2 TIMES DAILY
COMMUNITY
Start: 2023-10-30

## 2023-11-13 NOTE — PROGRESS NOTES
"[facility] Cardiology  Office visit  Corrina Reis  1965  [phone]  There is no work phone number on file.    VISIT DATE:  11/13/2023    PCP: Abbey Merrill MD  [address] [place] [address]    CC:  Chief Complaint   Patient presents with    Autonomic orthostatic hypotension           ASSESSMENT:   Diagnosis Plan   1. Autonomic orthostatic hypotension        2. Mixed hyperlipidemia            PLAN:  Recurrent orthostatic hypotension:  Continue conservative measures such as avoiding dehydration, liberalizing salt intake, elevating head of bed and as needed use of compression stockings.    Continue midodrine 10 mg p.o. 3 times daily and Florinef 0.1 mg p.o. daily.  Recently added pyridostigmine 60 mg p.o. twice daily.      Subjective  Interval assessment: 3 episodes of syncope since her last evaluation over the previous 6 months.  Using wheeled walker for ambulation.  Compliant with medical therapy.    Initial evaluation: 56-year-old female who is developed recurrent presyncope and syncope starting in March of this year.  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  Now using a wheeled walker for ambulation due to recurrent presyncope syncope.  Systolic blood pressures will intermittently dipped down into the 80 to 90 mmHg range.  No change in baseline weight.  No new medication prior to onset of her symptoms.  No illness prior to onset of symptoms.  She has been weaned off metoprolol.  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    PHYSICAL EXAMINATION:  Vitals:    11/13/23 1124   BP: 120/76   BP Location: Left arm   Patient Position: Sitting   Pulse: 96   SpO2: 97%   Weight: 100 kg (220 lb 6.4 oz)   Height: 175.3 cm (69\")     General Appearance:    Alert, cooperative, no distress, " "appears stated age   Head:    Normocephalic, without obvious abnormality, atraumatic   Eyes:    conjunctiva/corneas clear   Nose:   Nares normal, septum midline, mucosa normal, no drainage   Throat:   Lips, teeth and gums normal   Neck:   Supple, symmetrical, trachea midline, no carotid    bruit or JVD   Lungs:     Clear to auscultation bilaterally, respirations unlabored   Chest Wall:    No tenderness or deformity    Heart:    Regular rate and rhythm, S1 and S2 normal, no murmur, rub   or gallop, normal carotid impulse bilaterally without bruit.   Abdomen:     Soft, non-tender   Extremities:   Extremities normal, atraumatic, no cyanosis.  Trivial to 1+ bilateral ankle and pretibial edema.   Pulses:   2+ and symmetric all extremities   Skin:   Skin color, texture, turgor normal, no rashes or lesions       Diagnostic Data:  Procedures  No results found for: \"CHLPL\", \"TRIG\", \"HDL\", \"LDLDIRECT\"  Lab Results   Component Value Date    GLUCOSE 113 (H) 11/14/2022    BUN 19 03/27/2023    CREATININE 1.4 (H) 03/27/2023     03/27/2023    K 4.0 03/27/2023     03/27/2023    CO2 21 03/27/2023     Lab Results   Component Value Date    HGBA1C 5.10 06/27/2022     Lab Results   Component Value Date    WBC 6.66 11/14/2022    HGB 13.8 11/14/2022    HCT 40.5 11/14/2022     11/14/2022       Allergies  Allergies   Allergen Reactions    Msg [Monosodium Glutamate] Nausea And Vomiting and Headache     Numbness around mouth and eyes    Orange Nausea And Vomiting and Headache     Numbness around mouth and eyes    Penicillins Hives    Sulfa Antibiotics Hives       Current Medications    Current Outpatient Medications:     amitriptyline (ELAVIL) 50 MG tablet, Take 1 tablet by mouth every night at bedtime., Disp: , Rfl:     atorvastatin (LIPITOR) 40 MG tablet, Take 1 tablet by mouth Every Night. Resume after finishing Daptomycin, Disp: , Rfl:     bumetanide (BUMEX) 1 MG tablet, Take 1 tablet by mouth 2 (Two) Times a Day., Disp: " , Rfl:     buPROPion (WELLBUTRIN) 100 MG tablet, Take 1 tablet by mouth 2 (Two) Times a Day., Disp: , Rfl:     Cholecalciferol (Vitamin D3) 50 MCG (2000 UT) tablet, Daily., Disp: , Rfl:     docusate sodium (Colace) 100 MG capsule, Take 1 capsule by mouth 2 (Two) Times a Day., Disp: , Rfl:     DULoxetine (CYMBALTA) 60 MG capsule, Take 1 capsule by mouth Daily., Disp: , Rfl:     Eliquis 5 MG tablet tablet, Take 1 tablet by mouth Every 12 (Twelve) Hours., Disp: , Rfl:     famotidine (PEPCID) 20 MG tablet, Take 1 tablet by mouth 2 (two) times a day., Disp: , Rfl:     fludrocortisone 0.1 MG tablet, Take 2 tablets by mouth Daily., Disp: 60 tablet, Rfl: 3    FLUoxetine (PROzac) 20 MG capsule, Take 1 capsule by mouth Daily., Disp: , Rfl:     hydrOXYzine (ATARAX) 50 MG tablet, Take 1 tablet by mouth Daily., Disp: , Rfl:     levothyroxine (SYNTHROID, LEVOTHROID) 100 MCG tablet, Take 1 tablet by mouth Every Morning., Disp: , Rfl:     lidocaine (LIDODERM) 5 %, Place 1 patch on the skin as directed by provider Daily As Needed for Moderate Pain (knee pain)., Disp: , Rfl:     Linzess 145 MCG capsule capsule, Take 1 capsule by mouth Every Morning Before Breakfast., Disp: , Rfl:     meloxicam (MOBIC) 15 MG tablet, Take 1 tablet by mouth Daily., Disp: , Rfl:     methocarbamol (ROBAXIN) 750 MG tablet, Take 1 tablet by mouth 4 (Four) Times a Day., Disp: , Rfl:     midodrine (PROAMATINE) 10 MG tablet, TAKE ONE TABLET BY MOUTH THREE TIMES A DAY BEFORE MEALS, Disp: 90 tablet, Rfl: 1    potassium chloride ER (K-TAB) 20 MEQ tablet controlled-release ER tablet, Take 1 tablet by mouth As Needed., Disp: , Rfl:     pyridostigmine (MESTINON) 60 MG tablet, Take 0.5 tablets by mouth 2 (Two) Times a Day., Disp: 30 tablet, Rfl: 3    vitamin D (ERGOCALCIFEROL) 1.25 MG (63717 UT) capsule capsule, Take 1 capsule by mouth Every 7 (Seven) Days., Disp: , Rfl:     OnabotulinumtoxinA (Botox) 200 units reconstituted solution, Every 3 (Three) Months. (Patient  not taking: Reported on 2023), Disp: , Rfl:           ROS  ROS      SOCIAL HX  Social History     Socioeconomic History    Marital status:    Tobacco Use    Smoking status: Never    Smokeless tobacco: Never   Vaping Use    Vaping Use: Never used   Substance and Sexual Activity    Alcohol use: Never    Drug use: Never    Sexual activity: Not Currently     Partners: Male     Birth control/protection: Post-menopausal       FAMILY HX  Family History   Problem Relation Age of Onset    Heart disease Mother     Atrial fibrillation Mother     Diabetes Mother     Arrhythmia Mother     Hyperlipidemia Mother     Heart failure Mother     No Known Problems Father     Arrhythmia Sister     Hypertension Sister     Autoimmune disease Sister     Diabetes Sister     Graves' disease Sister     Lung cancer Brother     Rectal cancer Brother     Diabetes Brother     Hypertension Brother     Graves' disease Brother     Hyperlipidemia Brother     Hypertension Brother     Heart attack Brother             Heart attack Brother     Hyperlipidemia Brother     Hypertension Brother     Hyperlipidemia Sister     Hypertension Sister     Hyperlipidemia Sister     Hypertension Sister     Arrhythmia Sister     Hyperlipidemia Brother     Hypertension Brother     Hyperlipidemia Sister     Hypertension Sister     Hypertension Sister     Hypertension Brother              Raman Meza III, MD, FACC

## 2024-01-24 NOTE — PROGRESS NOTES
[facility]    Acute pain service Inpatient Progress Note    Patient Name: Corrina Reis  :  1965  MRN:  [id number]        Acute Pain  Service Inpatient Progress Note:    Analgesia:Good  Pain Score:5/10  LOC: alert and awake  Resp Status: room air  Cardiac: VS stable  Side Effects:None  Catheter Site:clean, dressing intact and dry  Cath type: peripheral nerve cath with ON Q  Infusion rate: 10ml/hr  Dosing/Volume: ropivacaine 0.2%  Catheter Plan:Catheter to remain Insitu and Continue catheter infusion rate unchanged                                             Female

## 2024-02-05 RX ORDER — PYRIDOSTIGMINE BROMIDE 60 MG/1
30 TABLET ORAL 2 TIMES DAILY
Qty: 30 TABLET | Refills: 1 | Status: SHIPPED | OUTPATIENT
Start: 2024-02-05

## 2024-03-26 ENCOUNTER — TELEPHONE (OUTPATIENT)
Dept: CARDIOLOGY | Facility: CLINIC | Age: 59
End: 2024-03-26
Payer: COMMERCIAL

## 2024-03-26 NOTE — TELEPHONE ENCOUNTER
Nadeen from '[facility] office is rescheduling her Colonoscopy to Monday 4/1/24 because of having trouble with her bowel prep. Has been off her Eliquis 4 days now. It was originally scheduled for tomorrow. Wants to know if they can keep her off Eliquis until her Colonoscopy on Monday? Holding it for a total of 10 days or reschedule her further out and hold it 5 days prior to the procedure. Please advise.

## 2024-04-01 RX ORDER — PYRIDOSTIGMINE BROMIDE 60 MG/1
TABLET ORAL
Qty: 30 TABLET | Refills: 1 | Status: SHIPPED | OUTPATIENT
Start: 2024-04-01

## 2024-04-08 RX ORDER — MIDODRINE HYDROCHLORIDE 10 MG/1
10 TABLET ORAL
Qty: 90 TABLET | Refills: 3 | Status: SHIPPED | OUTPATIENT
Start: 2024-04-08

## 2024-05-06 RX ORDER — FLUDROCORTISONE ACETATE 0.1 MG/1
0.2 TABLET ORAL DAILY
Qty: 60 TABLET | Refills: 1 | Status: SHIPPED | OUTPATIENT
Start: 2024-05-06

## 2024-06-05 RX ORDER — PYRIDOSTIGMINE BROMIDE 60 MG/1
TABLET ORAL
Qty: 30 TABLET | Refills: 0 | Status: SHIPPED | OUTPATIENT
Start: 2024-06-05

## 2024-07-08 RX ORDER — FLUDROCORTISONE ACETATE 0.1 MG/1
TABLET ORAL
Qty: 60 TABLET | Refills: 1 | Status: SHIPPED | OUTPATIENT
Start: 2024-07-08

## 2024-07-08 RX ORDER — PYRIDOSTIGMINE BROMIDE 60 MG/1
TABLET ORAL
Qty: 30 TABLET | Refills: 0 | Status: SHIPPED | OUTPATIENT
Start: 2024-07-08

## 2024-07-10 ENCOUNTER — OFFICE VISIT (OUTPATIENT)
Dept: CARDIOLOGY | Facility: CLINIC | Age: 59
End: 2024-07-10
Payer: COMMERCIAL

## 2024-07-10 VITALS
HEART RATE: 102 BPM | OXYGEN SATURATION: 99 % | SYSTOLIC BLOOD PRESSURE: 122 MMHG | WEIGHT: 229 LBS | BODY MASS INDEX: 33.92 KG/M2 | HEIGHT: 69 IN | DIASTOLIC BLOOD PRESSURE: 74 MMHG

## 2024-07-10 DIAGNOSIS — I95.1 AUTONOMIC ORTHOSTATIC HYPOTENSION: Primary | ICD-10-CM

## 2024-07-10 PROCEDURE — 99213 OFFICE O/P EST LOW 20 MIN: CPT | Performed by: INTERNAL MEDICINE

## 2024-07-10 RX ORDER — OMEPRAZOLE 20 MG/1
20 CAPSULE, DELAYED RELEASE ORAL
COMMUNITY
Start: 2024-04-01

## 2024-07-10 NOTE — PROGRESS NOTES
"[facility] Cardiology  Office visit  Corrina Reis  1965  [phone]  There is no work phone number on file.    VISIT DATE:  7/10/2024    PCP: Leonela Rausch  [address] [place] [address]    CC:  Chief Complaint   Patient presents with    Autonomic orthostatic hypotension           ASSESSMENT:   Diagnosis Plan   1. Autonomic orthostatic hypotension              PLAN:  Recurrent orthostatic hypotension:  Continue conservative measures such as avoiding dehydration, liberalizing salt intake, elevating head of bed and as needed use of compression stockings.    Continue midodrine 10 mg p.o. 3 times daily and Florinef 0.1 mg p.o. daily.  Continue pyridostigmine 60 mg p.o. twice daily.      Subjective  Interval assessment: No further episodes of syncope.  Presyncope is well-controlled.  Functional limitations mainly due to low back pain.    Initial evaluation: 56-year-old female who is developed recurrent presyncope and syncope starting in March of this year.  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  Now using a wheeled walker for ambulation due to recurrent presyncope syncope.  Systolic blood pressures will intermittently dipped down into the 80 to 90 mmHg range.  No change in baseline weight.  No new medication prior to onset of her symptoms.  No illness prior to onset of symptoms.  She has been weaned off metoprolol.  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    PHYSICAL EXAMINATION:  Vitals:    07/10/24 1500   BP: 122/74   BP Location: Left arm   Patient Position: Sitting   Cuff Size: Adult   Pulse: 102   SpO2: 99%   Weight: 104 kg (229 lb)   Height: 175.3 cm (69\")     General Appearance:    Alert, cooperative, no distress, appears stated age   Head:    Normocephalic, without " "obvious abnormality, atraumatic   Eyes:    conjunctiva/corneas clear   Nose:   Nares normal, septum midline, mucosa normal, no drainage   Throat:   Lips, teeth and gums normal   Neck:   Supple, symmetrical, trachea midline, no carotid    bruit or JVD   Lungs:     Clear to auscultation bilaterally, respirations unlabored   Chest Wall:    No tenderness or deformity    Heart:    Regular rate and rhythm, S1 and S2 normal, no murmur, rub   or gallop, normal carotid impulse bilaterally without bruit.   Abdomen:     Soft, non-tender   Extremities:   Extremities normal, atraumatic, no cyanosis.  Trivial to 1+ bilateral ankle and pretibial edema.   Pulses:   2+ and symmetric all extremities   Skin:   Skin color, texture, turgor normal, no rashes or lesions       Diagnostic Data:  Procedures  No results found for: \"CHLPL\", \"TRIG\", \"HDL\", \"LDLDIRECT\"  Lab Results   Component Value Date    GLUCOSE 113 (H) 11/14/2022    BUN 19 03/27/2023    CREATININE 1.4 (H) 03/27/2023     03/27/2023    K 4.0 03/27/2023     03/27/2023    CO2 21 03/27/2023     Lab Results   Component Value Date    HGBA1C 5.10 06/27/2022     Lab Results   Component Value Date    WBC 6.66 11/14/2022    HGB 13.8 11/14/2022    HCT 40.5 11/14/2022     11/14/2022       Allergies  Allergies   Allergen Reactions    Msg [Monosodium Glutamate] Nausea And Vomiting and Headache     Numbness around mouth and eyes    Orange Nausea And Vomiting and Headache     Numbness around mouth and eyes    Penicillins Hives    Sulfa Antibiotics Hives       Current Medications    Current Outpatient Medications:     amitriptyline (ELAVIL) 50 MG tablet, Take 1 tablet by mouth every night at bedtime., Disp: , Rfl:     atorvastatin (LIPITOR) 40 MG tablet, Take 1 tablet by mouth Every Night. Resume after finishing Daptomycin, Disp: , Rfl:     bumetanide (BUMEX) 1 MG tablet, Take 1 tablet by mouth 2 (Two) Times a Day., Disp: , Rfl:     buPROPion (WELLBUTRIN) 100 MG tablet, " Take 1 tablet by mouth 2 (Two) Times a Day., Disp: , Rfl:     docusate sodium (Colace) 100 MG capsule, Take 1 capsule by mouth 2 (Two) Times a Day., Disp: , Rfl:     DULoxetine (CYMBALTA) 60 MG capsule, Take 1 capsule by mouth Daily., Disp: , Rfl:     Eliquis 5 MG tablet tablet, Take 1 tablet by mouth Every 12 (Twelve) Hours., Disp: , Rfl:     fludrocortisone 0.1 MG tablet, TAKE 2 TABLETS BY MOUTH DAILY *NEED LAB WORK FOR FURTHER REFILLS, Disp: 60 tablet, Rfl: 1    hydrOXYzine (ATARAX) 50 MG tablet, Take 1 tablet by mouth Daily., Disp: , Rfl:     levothyroxine (SYNTHROID, LEVOTHROID) 100 MCG tablet, Take 1 tablet by mouth Every Morning., Disp: , Rfl:     Linzess 145 MCG capsule capsule, Take 1 capsule by mouth Every Morning Before Breakfast., Disp: , Rfl:     meloxicam (MOBIC) 15 MG tablet, Take 1 tablet by mouth Daily., Disp: , Rfl:     methocarbamol (ROBAXIN) 750 MG tablet, Take 1 tablet by mouth 4 (Four) Times a Day., Disp: , Rfl:     midodrine (PROAMATINE) 10 MG tablet, Take 1 tablet by mouth 3 (Three) Times a Day Before Meals., Disp: 90 tablet, Rfl: 3    omeprazole (priLOSEC) 20 MG capsule, 1 capsule., Disp: , Rfl:     potassium chloride ER (K-TAB) 20 MEQ tablet controlled-release ER tablet, Take 1 tablet by mouth As Needed., Disp: , Rfl:     pyridostigmine (MESTINON) 60 MG tablet, TAKE ONE HALF TABLET TWO TIMES DAILY. *NEED LAB WORK FOR FURTHER REFILLS., Disp: 30 tablet, Rfl: 0    vitamin D (ERGOCALCIFEROL) 1.25 MG (23838 UT) capsule capsule, Take 1 capsule by mouth Every 7 (Seven) Days., Disp: , Rfl:           ROS  ROS      SOCIAL HX  Social History     Socioeconomic History    Marital status:    Tobacco Use    Smoking status: Never     Passive exposure: Never    Smokeless tobacco: Never   Vaping Use    Vaping status: Never Used   Substance and Sexual Activity    Alcohol use: Never    Drug use: Never    Sexual activity: Not Currently     Partners: Male     Birth control/protection: Post-menopausal,  Hysterectomy       FAMILY HX  Family History   Problem Relation Age of Onset    Heart disease Mother     Atrial fibrillation Mother     Diabetes Mother     Arrhythmia Mother     Hyperlipidemia Mother     Heart failure Mother     No Known Problems Father     Arrhythmia Sister     Hypertension Sister     Autoimmune disease Sister     Diabetes Sister     Graves' disease Sister     Lung cancer Brother     Rectal cancer Brother     Diabetes Brother     Hypertension Brother     Graves' disease Brother     Hyperlipidemia Brother     Hypertension Brother     Heart attack Brother             Heart attack Brother     Hyperlipidemia Brother     Hypertension Brother     Hyperlipidemia Sister     Hypertension Sister     Hyperlipidemia Sister     Hypertension Sister     Arrhythmia Sister     Hyperlipidemia Brother     Hypertension Brother     Hyperlipidemia Sister     Hypertension Sister     Hypertension Sister     Hypertension Brother              Raman Meza III, MD, FACC

## 2024-08-05 RX ORDER — PYRIDOSTIGMINE BROMIDE 60 MG/1
30 TABLET ORAL 2 TIMES DAILY
Qty: 30 TABLET | Refills: 11 | Status: SHIPPED | OUTPATIENT
Start: 2024-08-05

## (undated) DEVICE — CYSTO/BLADDER IRRIGATION SET, REGULATING CLAMP

## (undated) DEVICE — BNDG ELAS W/CLIP 6IN 10YD LF STRL

## (undated) DEVICE — PROXIMATE RH ROTATING HEAD SKIN STAPLERS (35 WIDE) CONTAINS 35 STAINLESS STEEL STAPLES: Brand: PROXIMATE

## (undated) DEVICE — DRSNG GZ PETROLTM XEROFORM CURAD 1X8IN STRL

## (undated) DEVICE — FEMORAL CANAL TIP, IRRIGATION/SUCTION

## (undated) DEVICE — PAD ARMBRD SURG CONVOL 7.5X20X2IN

## (undated) DEVICE — CONTAINER,SPECIMEN,OR STERILE,4OZ: Brand: MEDLINE

## (undated) DEVICE — PIN HOLD TEMP NOHEAD FLUT 1/8X3.5IN

## (undated) DEVICE — GLV SURG SENSICARE PI LF PF 7.0

## (undated) DEVICE — SYS PUMP PREVENA125 INCSN MNGT PP/DRSNG 45ML 1P/U

## (undated) DEVICE — SHEET, DRAPE, SPLIT, STERILE: Brand: MEDLINE

## (undated) DEVICE — BNDG ELAS CO-FLEX SLF ADHR 6IN 5YD LF STRL

## (undated) DEVICE — KNEE IMMOBILIZER: Brand: DEROYAL

## (undated) DEVICE — TBG PENCL TELESCP MEGADYNE SMOKE EVAC 10FT

## (undated) DEVICE — UNDERCAST PADDING: Brand: DEROYAL

## (undated) DEVICE — GLV SURG SENSICARE PI MIC PF SZ9 LF STRL

## (undated) DEVICE — UNIVERSAL CEMENT RESTRICTOR - DISPOSABLE INSERTER
Type: IMPLANTABLE DEVICE | Site: KNEE | Status: NON-FUNCTIONAL
Brand: RESTRICTOR
Removed: 2022-06-29

## (undated) DEVICE — BLD SAW GIGLI 51CM

## (undated) DEVICE — 4.0MM OVAL CARBIDE BUR

## (undated) DEVICE — DRAPE,U/ SHT,SPLIT,PLAS,STERIL: Brand: MEDLINE

## (undated) DEVICE — TRY EPID SFTY 18G 3.5IN 1T7680

## (undated) DEVICE — SUT PDS MONO 0 36 CT1 VIL PDP346H

## (undated) DEVICE — SYR CONTRL PRESS/LO FIX/M/LL W/THMB/RNG 10ML

## (undated) DEVICE — GLV SURG SENSICARE PI LF PF 6.5

## (undated) DEVICE — SOL ISO/ALC RUB 70PCT 4OZ

## (undated) DEVICE — GLV SURG PREMIERPRO MIC LTX PF SZ8 BRN

## (undated) DEVICE — SCRB SURG BACTOSHIELD CHG 4PCT 4OZ

## (undated) DEVICE — BLD TONG INDIV/WRP A/ 6IN STRL

## (undated) DEVICE — KT DRN EVAC WND PVC PCH WTROC RND 10F400

## (undated) DEVICE — STRYKER PERFORMANCE SERIES SAGITTAL BLADE: Brand: STRYKER PERFORMANCE SERIES

## (undated) DEVICE — GLV SURG SENSICARE PI LF PF 7.5

## (undated) DEVICE — PATIENT RETURN ELECTRODE, SINGLE-USE, CONTACT QUALITY MONITORING, ADULT, WITH 9FT CORD, FOR PATIENTS WEIGING OVER 33LBS. (15KG): Brand: MEGADYNE

## (undated) DEVICE — SOL HYDROGEN PEROX 3PCT 4OZ

## (undated) DEVICE — STERILE PVP: Brand: MEDLINE INDUSTRIES, INC.

## (undated) DEVICE — SHEET,DRAPE,53X77,STERILE: Brand: MEDLINE

## (undated) DEVICE — DRSNG SURESITE WNDW 4X4.5

## (undated) DEVICE — UNDERGLV SURG BIOGEL INDICAT PI SZ8.5 BLU

## (undated) DEVICE — SUT VIC 2/0 CT2 27IN J269H

## (undated) DEVICE — REVISION UNIVERSAL CEMENT RESTRICTOR- DISPOSABLE INSERTER
Type: IMPLANTABLE DEVICE | Site: KNEE | Status: NON-FUNCTIONAL
Brand: RESTRICTOR
Removed: 2022-06-29

## (undated) DEVICE — SWABSTK SKINPREP PVPI PRE/SAT 8IN STRL

## (undated) DEVICE — GLV SURG SENSICARE PI ORTHO SZ8.5 LF STRL

## (undated) DEVICE — GLV SURG PREMIERPRO MIC LTX PF SZ7.5 BRN

## (undated) DEVICE — PREMIUM DRY TRAY LF: Brand: MEDLINE INDUSTRIES, INC.

## (undated) DEVICE — CEMENT MIXING SYSTEM WITH MIS FEMORAL BREAKAWAY NOZZLE: Brand: REVOLUTION

## (undated) DEVICE — PUMP PAIN AUTOFUSER AUTO SELCT NOBOLUS 1TO14ML/HR 550ML DISP

## (undated) DEVICE — PRECISION THIN (9.0 X 0.38 X 31.0MM)

## (undated) DEVICE — GLV SURG PREMIERPRO MIC LTX PF SZ8.5 BRN

## (undated) DEVICE — ANTIBACTERIAL UNDYED BRAIDED (POLYGLACTIN 910), SYNTHETIC ABSORBABLE SUTURE: Brand: COATED VICRYL

## (undated) DEVICE — PK KN TOTL 10

## (undated) DEVICE — BLANKT WARM UPPR/BDY ARM/OUT 57X196CM

## (undated) DEVICE — SUT PDS 2 0 CT1 27IN CLR Z259H

## (undated) DEVICE — GLV SURG PREMIERPRO MIC LTX PF SZ6.5 BRN

## (undated) DEVICE — BOWL UTIL STRL 32OZ

## (undated) DEVICE — MARKER,SKIN,W/RULER,DUAL,STOP: Brand: MEDLINE

## (undated) DEVICE — GLV SURG PREMIERPRO MIC LTX PF SZ7 BRN

## (undated) DEVICE — NDL HYPO ECLPS SFTY 22G 1 1/2IN

## (undated) DEVICE — DISPOSABLE TOURNIQUET CUFF SINGLE BLADDER, DUAL PORT AND QUICK CONNECT CONNECTOR: Brand: COLOR CUFF

## (undated) DEVICE — SYR LUERLOK 50ML